# Patient Record
Sex: FEMALE | Race: WHITE | NOT HISPANIC OR LATINO | Employment: PART TIME | URBAN - METROPOLITAN AREA
[De-identification: names, ages, dates, MRNs, and addresses within clinical notes are randomized per-mention and may not be internally consistent; named-entity substitution may affect disease eponyms.]

---

## 2017-02-24 ENCOUNTER — CONVERSION ENCOUNTER (OUTPATIENT)
Dept: FAMILY MEDICINE CLINIC | Facility: CLINIC | Age: 67
End: 2017-02-24

## 2017-02-24 ENCOUNTER — HOSPITAL ENCOUNTER (OUTPATIENT)
Dept: FAMILY MEDICINE CLINIC | Facility: CLINIC | Age: 67
Setting detail: SPECIMEN
Discharge: HOME | End: 2017-02-24
Attending: NURSE PRACTITIONER | Admitting: NURSE PRACTITIONER

## 2017-02-24 LAB
BACTERIA ISLT: NORMAL
BACTERIA ISLT: NORMAL
BACTERIA SPEC AEROBE CULT: NORMAL
BACTERIA SPEC AEROBE CULT: NORMAL
COLONY COUNT: NORMAL
Lab: NORMAL
MICRO REPORT STATUS: NORMAL
NITROFURANTOIN SUSC ISLT: NORMAL
OXACILLIN SUSC ISLT: NORMAL
SPECIMEN SOURCE: NORMAL
SUSC METH SPEC: NORMAL
TETRACYCLINE SUSC ISLT: NORMAL
TRIMETHOPRIM/SULFA: NORMAL
VANCOMYCIN SUSC ISLT: NORMAL

## 2017-03-27 ENCOUNTER — CONVERSION ENCOUNTER (OUTPATIENT)
Dept: FAMILY MEDICINE CLINIC | Facility: CLINIC | Age: 67
End: 2017-03-27

## 2017-03-27 ENCOUNTER — HOSPITAL ENCOUNTER (OUTPATIENT)
Dept: FAMILY MEDICINE CLINIC | Facility: CLINIC | Age: 67
Setting detail: SPECIMEN
Discharge: HOME | End: 2017-03-27

## 2017-03-27 LAB
ALBUMIN SERPL-MCNC: 3.8 G/DL (ref 3.5–4.8)
ALBUMIN/GLOB SERPL: 1.5 {RATIO} (ref 1–1.7)
ALP SERPL-CCNC: 57 IU/L (ref 32–91)
ALT SERPL-CCNC: 20 IU/L (ref 14–54)
ANION GAP SERPL CALC-SCNC: 11.8 MMOL/L (ref 10–20)
AST SERPL-CCNC: 21 IU/L (ref 15–41)
BASOPHILS # BLD AUTO: 0 10*3/UL (ref 0–0.2)
BASOPHILS NFR BLD AUTO: 1 % (ref 0–2)
BILIRUB SERPL-MCNC: 1 MG/DL (ref 0.3–1.2)
BUN SERPL-MCNC: 9 MG/DL (ref 8–20)
BUN/CREAT SERPL: 11.3 (ref 5.4–26.2)
CALCIUM SERPL-MCNC: 9.2 MG/DL (ref 8.9–10.3)
CHLORIDE SERPL-SCNC: 104 MMOL/L (ref 101–111)
CHOLEST SERPL-MCNC: 225 MG/DL
CHOLEST/HDLC SERPL: 5.3 {RATIO}
CONV CO2: 30 MMOL/L (ref 22–32)
CONV LDL CHOLESTEROL DIRECT: 149 MG/DL (ref 0–100)
CONV SMEAR REVIEW: (no result)
CONV TOTAL PROTEIN: 6.4 G/DL (ref 6.1–7.9)
CREAT UR-MCNC: 0.8 MG/DL (ref 0.4–1)
DIFFERENTIAL METHOD BLD: (no result)
EOSINOPHIL # BLD AUTO: 0.1 10*3/UL (ref 0–0.3)
EOSINOPHIL # BLD AUTO: 2 % (ref 0–3)
ERYTHROCYTE [DISTWIDTH] IN BLOOD BY AUTOMATED COUNT: 14.2 % (ref 11.5–14.5)
GLOBULIN UR ELPH-MCNC: 2.6 G/DL (ref 2.5–3.8)
GLUCOSE SERPL-MCNC: 82 MG/DL (ref 65–99)
HCT VFR BLD AUTO: 43.2 % (ref 35–49)
HDLC SERPL-MCNC: 43 MG/DL
HGB BLD-MCNC: 14.5 G/DL (ref 12–15)
LDLC/HDLC SERPL: 3.5 {RATIO}
LIPID INTERPRETATION: ABNORMAL
LYMPHOCYTES # BLD AUTO: 0.6 10*3/UL (ref 0.8–4.8)
LYMPHOCYTES NFR BLD AUTO: 17 % (ref 18–42)
MCH RBC QN AUTO: 30.2 PG (ref 26–32)
MCHC RBC AUTO-ENTMCNC: 33.6 G/DL (ref 32–36)
MCV RBC AUTO: 89.8 FL (ref 80–94)
MONOCYTES # BLD AUTO: 0.2 10*3/UL (ref 0.1–1.3)
MONOCYTES NFR BLD AUTO: 7 % (ref 2–11)
NEUTROPHILS # BLD AUTO: 2.4 10*3/UL (ref 2.3–8.6)
NEUTROPHILS NFR BLD AUTO: 73 % (ref 50–75)
NRBC BLD AUTO-RTO: 0 /100{WBCS}
NRBC/RBC NFR BLD MANUAL: 0 10*3/UL
PLATELET # BLD AUTO: (no result) 10*3/UL (ref 150–450)
PLT COMMENT: (no result)
PMV BLD AUTO: 8.3 FL (ref 7.4–10.4)
POTASSIUM SERPL-SCNC: 4.8 MMOL/L (ref 3.6–5.1)
RBC # BLD AUTO: 4.82 10*6/UL (ref 4–5.4)
SODIUM SERPL-SCNC: 141 MMOL/L (ref 136–144)
TRIGL SERPL-MCNC: 180 MG/DL
TSH SERPL-ACNC: 1.69 UIU/ML (ref 0.34–5.6)
VLDLC SERPL CALC-MCNC: 33.2 MG/DL
WBC # BLD AUTO: 3.3 10*3/UL (ref 4.5–11.5)

## 2017-07-17 ENCOUNTER — CONVERSION ENCOUNTER (OUTPATIENT)
Dept: FAMILY MEDICINE CLINIC | Facility: CLINIC | Age: 67
End: 2017-07-17

## 2017-08-04 ENCOUNTER — OFFICE (AMBULATORY)
Dept: URBAN - METROPOLITAN AREA CLINIC 64 | Facility: CLINIC | Age: 67
End: 2017-08-04

## 2017-08-04 VITALS
WEIGHT: 244 LBS | DIASTOLIC BLOOD PRESSURE: 87 MMHG | HEIGHT: 64 IN | SYSTOLIC BLOOD PRESSURE: 152 MMHG | HEART RATE: 76 BPM

## 2017-08-04 DIAGNOSIS — Z86.010 PERSONAL HISTORY OF COLONIC POLYPS: ICD-10-CM

## 2017-08-04 DIAGNOSIS — K74.60 UNSPECIFIED CIRRHOSIS OF LIVER: ICD-10-CM

## 2017-08-04 DIAGNOSIS — I85.00 ESOPHAGEAL VARICES WITHOUT BLEEDING: ICD-10-CM

## 2017-08-04 LAB
AFP, SERUM, TUMOR MARKER: 2.8 NG/ML (ref 0–8.3)
AMMONIA, PLASMA: 30 UG/DL (ref 19–87)
CBC WITH DIFFERENTIAL/PLATELET: BASO (ABSOLUTE): 0 X10E3/UL (ref 0–0.2)
CBC WITH DIFFERENTIAL/PLATELET: BASOS: 0 %
CBC WITH DIFFERENTIAL/PLATELET: EOS (ABSOLUTE): 0.1 X10E3/UL (ref 0–0.4)
CBC WITH DIFFERENTIAL/PLATELET: EOS: 2 %
CBC WITH DIFFERENTIAL/PLATELET: HEMATOCRIT: 35.8 % (ref 34–46.6)
CBC WITH DIFFERENTIAL/PLATELET: HEMATOLOGY COMMENTS: (no result)
CBC WITH DIFFERENTIAL/PLATELET: HEMOGLOBIN: 11.7 G/DL (ref 11.1–15.9)
CBC WITH DIFFERENTIAL/PLATELET: IMMATURE CELLS: (no result)
CBC WITH DIFFERENTIAL/PLATELET: IMMATURE GRANS (ABS): 0 X10E3/UL (ref 0–0.1)
CBC WITH DIFFERENTIAL/PLATELET: IMMATURE GRANULOCYTES: 0 %
CBC WITH DIFFERENTIAL/PLATELET: LYMPHS (ABSOLUTE): 0.6 X10E3/UL — LOW (ref 0.7–3.1)
CBC WITH DIFFERENTIAL/PLATELET: LYMPHS: 22 %
CBC WITH DIFFERENTIAL/PLATELET: MCH: 29.5 PG (ref 26.6–33)
CBC WITH DIFFERENTIAL/PLATELET: MCHC: 32.7 G/DL (ref 31.5–35.7)
CBC WITH DIFFERENTIAL/PLATELET: MCV: 90 FL (ref 79–97)
CBC WITH DIFFERENTIAL/PLATELET: MONOCYTES(ABSOLUTE): 0.4 X10E3/UL (ref 0.1–0.9)
CBC WITH DIFFERENTIAL/PLATELET: MONOCYTES: 17 %
CBC WITH DIFFERENTIAL/PLATELET: NEUTROPHILS (ABSOLUTE): 1.5 X10E3/UL (ref 1.4–7)
CBC WITH DIFFERENTIAL/PLATELET: NEUTROPHILS: 59 %
CBC WITH DIFFERENTIAL/PLATELET: NRBC: (no result)
CBC WITH DIFFERENTIAL/PLATELET: PLATELETS: 97 X10E3/UL — CRITICAL LOW (ref 150–379)
CBC WITH DIFFERENTIAL/PLATELET: RBC: 3.97 X10E6/UL (ref 3.77–5.28)
CBC WITH DIFFERENTIAL/PLATELET: RDW: 15 % (ref 12.3–15.4)
CBC WITH DIFFERENTIAL/PLATELET: WBC: 2.5 X10E3/UL — CRITICAL LOW (ref 3.4–10.8)
COMP. METABOLIC PANEL (14): A/G RATIO: 2 (ref 1.2–2.2)
COMP. METABOLIC PANEL (14): ALBUMIN, SERUM: 4.1 G/DL (ref 3.6–4.8)
COMP. METABOLIC PANEL (14): ALKALINE PHOSPHATASE, S: 69 IU/L (ref 39–117)
COMP. METABOLIC PANEL (14): ALT (SGPT): 25 IU/L (ref 0–32)
COMP. METABOLIC PANEL (14): AST (SGOT): 29 IU/L (ref 0–40)
COMP. METABOLIC PANEL (14): BILIRUBIN, TOTAL: 0.9 MG/DL (ref 0–1.2)
COMP. METABOLIC PANEL (14): BUN/CREATININE RATIO: 15 (ref 12–28)
COMP. METABOLIC PANEL (14): BUN: 13 MG/DL (ref 8–27)
COMP. METABOLIC PANEL (14): CALCIUM, SERUM: 8.6 MG/DL — LOW (ref 8.7–10.3)
COMP. METABOLIC PANEL (14): CARBON DIOXIDE, TOTAL: 25 MMOL/L (ref 18–29)
COMP. METABOLIC PANEL (14): CHLORIDE, SERUM: 97 MMOL/L (ref 96–106)
COMP. METABOLIC PANEL (14): CREATININE, SERUM: 0.88 MG/DL (ref 0.57–1)
COMP. METABOLIC PANEL (14): EGFR IF AFRICN AM: 79 ML/MIN/1.73 (ref 59–?)
COMP. METABOLIC PANEL (14): EGFR IF NONAFRICN AM: 68 ML/MIN/1.73 (ref 59–?)
COMP. METABOLIC PANEL (14): GLOBULIN, TOTAL: 2.1 G/DL (ref 1.5–4.5)
COMP. METABOLIC PANEL (14): GLUCOSE, SERUM: 99 MG/DL (ref 65–99)
COMP. METABOLIC PANEL (14): POTASSIUM, SERUM: 3.4 MMOL/L — LOW (ref 3.5–5.2)
COMP. METABOLIC PANEL (14): PROTEIN, TOTAL, SERUM: 6.2 G/DL (ref 6–8.5)
COMP. METABOLIC PANEL (14): SODIUM, SERUM: 139 MMOL/L (ref 134–144)
PANEL 083935: HIV SCREEN 4TH GENERATION WRFX: NON REACTIVE
PROTHROMBIN TIME (PT): INR: 1 (ref 0.8–1.2)
PROTHROMBIN TIME (PT): PROTHROMBIN TIME: 10.7 SEC (ref 9.1–12)

## 2017-08-04 PROCEDURE — 99214 OFFICE O/P EST MOD 30 MIN: CPT | Performed by: NURSE PRACTITIONER

## 2017-09-06 ENCOUNTER — ON CAMPUS - OUTPATIENT (AMBULATORY)
Dept: URBAN - METROPOLITAN AREA HOSPITAL 2 | Facility: HOSPITAL | Age: 67
End: 2017-09-06

## 2017-09-06 VITALS
SYSTOLIC BLOOD PRESSURE: 143 MMHG | WEIGHT: 238 LBS | OXYGEN SATURATION: 100 % | SYSTOLIC BLOOD PRESSURE: 127 MMHG | SYSTOLIC BLOOD PRESSURE: 151 MMHG | RESPIRATION RATE: 20 BRPM | DIASTOLIC BLOOD PRESSURE: 68 MMHG | TEMPERATURE: 98.3 F | DIASTOLIC BLOOD PRESSURE: 31 MMHG | DIASTOLIC BLOOD PRESSURE: 80 MMHG | SYSTOLIC BLOOD PRESSURE: 168 MMHG | DIASTOLIC BLOOD PRESSURE: 69 MMHG | DIASTOLIC BLOOD PRESSURE: 70 MMHG | SYSTOLIC BLOOD PRESSURE: 147 MMHG | RESPIRATION RATE: 18 BRPM | DIASTOLIC BLOOD PRESSURE: 87 MMHG | HEIGHT: 64 IN | HEART RATE: 76 BPM | HEART RATE: 73 BPM | OXYGEN SATURATION: 97 % | HEART RATE: 80 BPM | DIASTOLIC BLOOD PRESSURE: 72 MMHG | SYSTOLIC BLOOD PRESSURE: 135 MMHG | DIASTOLIC BLOOD PRESSURE: 76 MMHG | RESPIRATION RATE: 16 BRPM | SYSTOLIC BLOOD PRESSURE: 120 MMHG | DIASTOLIC BLOOD PRESSURE: 74 MMHG | HEART RATE: 72 BPM | OXYGEN SATURATION: 98 % | OXYGEN SATURATION: 96 % | HEART RATE: 77 BPM | HEART RATE: 82 BPM | SYSTOLIC BLOOD PRESSURE: 140 MMHG | SYSTOLIC BLOOD PRESSURE: 142 MMHG | HEART RATE: 78 BPM | HEART RATE: 74 BPM | SYSTOLIC BLOOD PRESSURE: 150 MMHG | SYSTOLIC BLOOD PRESSURE: 154 MMHG

## 2017-09-06 DIAGNOSIS — Z86.010 PERSONAL HISTORY OF COLONIC POLYPS: ICD-10-CM

## 2017-09-06 DIAGNOSIS — I85.00 ESOPHAGEAL VARICES WITHOUT BLEEDING: ICD-10-CM

## 2017-09-06 DIAGNOSIS — K57.30 DIVERTICULOSIS OF LARGE INTESTINE WITHOUT PERFORATION OR ABS: ICD-10-CM

## 2017-09-06 PROCEDURE — 45378 DIAGNOSTIC COLONOSCOPY: CPT | Mod: PT

## 2017-09-06 PROCEDURE — 43244 EGD VARICES LIGATION: CPT

## 2017-09-06 RX ORDER — SPIRONOLACTONE 100 MG/1
TABLET, FILM COATED ORAL
Qty: 30 | Refills: 5 | Status: COMPLETED
Start: 2017-09-06 | End: 2019-01-01

## 2017-09-06 RX ADMIN — PROPOFOL: 10 INJECTION, EMULSION INTRAVENOUS at 13:59

## 2017-09-21 ENCOUNTER — CONVERSION ENCOUNTER (OUTPATIENT)
Dept: FAMILY MEDICINE CLINIC | Facility: CLINIC | Age: 67
End: 2017-09-21

## 2017-09-21 ENCOUNTER — HOSPITAL ENCOUNTER (OUTPATIENT)
Dept: FAMILY MEDICINE CLINIC | Facility: CLINIC | Age: 67
Setting detail: SPECIMEN
Discharge: HOME | End: 2017-09-21
Attending: NURSE PRACTITIONER | Admitting: NURSE PRACTITIONER

## 2017-09-21 LAB
ALBUMIN SERPL-MCNC: 4 G/DL (ref 3.5–4.8)
ALBUMIN/GLOB SERPL: 1.6 {RATIO} (ref 1–1.7)
ALP SERPL-CCNC: 61 IU/L (ref 32–91)
ALT SERPL-CCNC: 18 IU/L (ref 14–54)
ANION GAP SERPL CALC-SCNC: 10.5 MMOL/L (ref 10–20)
AST SERPL-CCNC: 21 IU/L (ref 15–41)
BILIRUB SERPL-MCNC: 1 MG/DL (ref 0.3–1.2)
BUN SERPL-MCNC: 15 MG/DL (ref 8–20)
BUN/CREAT SERPL: 16.7 (ref 5.4–26.2)
CALCIUM SERPL-MCNC: 9.1 MG/DL (ref 8.9–10.3)
CHLORIDE SERPL-SCNC: 101 MMOL/L (ref 101–111)
CHOLEST SERPL-MCNC: 155 MG/DL
CHOLEST/HDLC SERPL: 3.9 {RATIO}
CONV CO2: 30 MMOL/L (ref 22–32)
CONV LDL CHOLESTEROL DIRECT: 109 MG/DL (ref 0–100)
CONV TOTAL PROTEIN: 6.5 G/DL (ref 6.1–7.9)
CREAT UR-MCNC: 0.9 MG/DL (ref 0.4–1)
GLOBULIN UR ELPH-MCNC: 2.5 G/DL (ref 2.5–3.8)
GLUCOSE SERPL-MCNC: 95 MG/DL (ref 65–99)
HDLC SERPL-MCNC: 39 MG/DL
LDLC/HDLC SERPL: 2.8 {RATIO}
LIPID INTERPRETATION: ABNORMAL
POTASSIUM SERPL-SCNC: 4.5 MMOL/L (ref 3.6–5.1)
SODIUM SERPL-SCNC: 137 MMOL/L (ref 136–144)
TRIGL SERPL-MCNC: 80 MG/DL
TSH SERPL-ACNC: 2.34 UIU/ML (ref 0.34–5.6)
VLDLC SERPL CALC-MCNC: 6.8 MG/DL

## 2017-09-22 ENCOUNTER — HOSPITAL ENCOUNTER (OUTPATIENT)
Dept: FAMILY MEDICINE CLINIC | Facility: CLINIC | Age: 67
Setting detail: SPECIMEN
Discharge: HOME | End: 2017-09-22
Attending: NURSE PRACTITIONER | Admitting: NURSE PRACTITIONER

## 2017-09-22 ENCOUNTER — HOSPITAL ENCOUNTER (OUTPATIENT)
Dept: MAMMOGRAPHY | Facility: HOSPITAL | Age: 67
Discharge: HOME | End: 2017-09-22
Attending: NURSE PRACTITIONER | Admitting: NURSE PRACTITIONER

## 2017-09-22 LAB
AZTREONAM SUSC ISLT: NORMAL
BACTERIA ISLT: NORMAL
BACTERIA SPEC AEROBE CULT: NORMAL
CEFEPIME SUSC ISLT: NORMAL
CEFTRIAXONE SUSC ISLT: NORMAL
CIPROFLOXACIN SUSC ISLT: NORMAL
COLONY COUNT: NORMAL
ERTAPENEM SUSC ISLT: NORMAL
LEVOFLOXACIN SUSC ISLT: NORMAL
Lab: NORMAL
MEROPENEM SUSC ISLT: NORMAL
MICRO REPORT STATUS: NORMAL
NITROFURANTOIN SUSC ISLT: NORMAL
PIP+TAZO SUSC ISLT: NORMAL
SPECIMEN SOURCE: NORMAL
SUSC METH SPEC: NORMAL
TETRACYCLINE SUSC ISLT: NORMAL
TOBRAMYCIN SUSC ISLT: NORMAL
TRIMETHOPRIM/SULFA: NORMAL

## 2017-10-11 ENCOUNTER — OFFICE (AMBULATORY)
Dept: URBAN - METROPOLITAN AREA CLINIC 64 | Facility: CLINIC | Age: 67
End: 2017-10-11

## 2017-10-11 VITALS
DIASTOLIC BLOOD PRESSURE: 72 MMHG | SYSTOLIC BLOOD PRESSURE: 124 MMHG | HEART RATE: 73 BPM | WEIGHT: 232 LBS | HEIGHT: 64 IN

## 2017-10-11 DIAGNOSIS — K57.32 DIVERTICULITIS OF LARGE INTESTINE WITHOUT PERFORATION OR ABS: ICD-10-CM

## 2017-10-11 DIAGNOSIS — R31.9 HEMATURIA, UNSPECIFIED: ICD-10-CM

## 2017-10-11 DIAGNOSIS — R10.32 LEFT LOWER QUADRANT PAIN: ICD-10-CM

## 2017-10-11 PROCEDURE — 99213 OFFICE O/P EST LOW 20 MIN: CPT

## 2017-10-11 RX ORDER — CIPROFLOXACIN 500 MG/1
1000 TABLET, FILM COATED ORAL
Qty: 20 | Refills: 0 | Status: ACTIVE
Start: 2017-10-11

## 2018-02-15 ENCOUNTER — HOSPITAL ENCOUNTER (OUTPATIENT)
Dept: FAMILY MEDICINE CLINIC | Facility: CLINIC | Age: 68
Setting detail: SPECIMEN
Discharge: HOME | End: 2018-02-15
Attending: NURSE PRACTITIONER | Admitting: NURSE PRACTITIONER

## 2018-02-15 ENCOUNTER — CONVERSION ENCOUNTER (OUTPATIENT)
Dept: FAMILY MEDICINE CLINIC | Facility: CLINIC | Age: 68
End: 2018-02-15

## 2018-02-15 LAB
ANION GAP SERPL CALC-SCNC: 10.1 MMOL/L (ref 10–20)
BASOPHILS # BLD AUTO: 0 10*3/UL (ref 0–0.2)
BASOPHILS NFR BLD AUTO: 1 % (ref 0–2)
BUN SERPL-MCNC: 27 MG/DL (ref 8–20)
BUN/CREAT SERPL: 19.3 (ref 5.4–26.2)
CALCIUM SERPL-MCNC: 9.2 MG/DL (ref 8.9–10.3)
CHLORIDE SERPL-SCNC: 105 MMOL/L (ref 101–111)
CHOLEST SERPL-MCNC: 155 MG/DL
CHOLEST/HDLC SERPL: 5.2 {RATIO}
CONV CO2: 25 MMOL/L (ref 22–32)
CONV LDL CHOLESTEROL DIRECT: 106 MG/DL (ref 0–100)
CREAT UR-MCNC: 1.4 MG/DL (ref 0.4–1)
DIFFERENTIAL METHOD BLD: (no result)
EOSINOPHIL # BLD AUTO: 0.1 10*3/UL (ref 0–0.3)
EOSINOPHIL # BLD AUTO: 1 % (ref 0–3)
ERYTHROCYTE [DISTWIDTH] IN BLOOD BY AUTOMATED COUNT: 15.6 % (ref 11.5–14.5)
GLUCOSE SERPL-MCNC: 105 MG/DL (ref 65–99)
HCT VFR BLD AUTO: 36.4 % (ref 35–49)
HDLC SERPL-MCNC: 30 MG/DL
HGB BLD-MCNC: 12.4 G/DL (ref 12–15)
LDLC/HDLC SERPL: 3.5 {RATIO}
LIPID INTERPRETATION: ABNORMAL
LYMPHOCYTES # BLD AUTO: 1 10*3/UL (ref 0.8–4.8)
LYMPHOCYTES NFR BLD AUTO: 21 % (ref 18–42)
MCH RBC QN AUTO: 30.3 PG (ref 26–32)
MCHC RBC AUTO-ENTMCNC: 34 G/DL (ref 32–36)
MCV RBC AUTO: 89.4 FL (ref 80–94)
MONOCYTES # BLD AUTO: 0.5 10*3/UL (ref 0.1–1.3)
MONOCYTES NFR BLD AUTO: 11 % (ref 2–11)
NEUTROPHILS # BLD AUTO: 3.1 10*3/UL (ref 2.3–8.6)
NEUTROPHILS NFR BLD AUTO: 66 % (ref 50–75)
NRBC BLD AUTO-RTO: 0 /100{WBCS}
NRBC/RBC NFR BLD MANUAL: 0 10*3/UL
PLATELET # BLD AUTO: 116 10*3/UL (ref 150–450)
PMV BLD AUTO: 7.5 FL (ref 7.4–10.4)
POTASSIUM SERPL-SCNC: 5.1 MMOL/L (ref 3.6–5.1)
RBC # BLD AUTO: 4.08 10*6/UL (ref 4–5.4)
SODIUM SERPL-SCNC: 135 MMOL/L (ref 136–144)
TRIGL SERPL-MCNC: 120 MG/DL
VLDLC SERPL CALC-MCNC: 19.1 MG/DL
WBC # BLD AUTO: 4.6 10*3/UL (ref 4.5–11.5)

## 2018-03-06 ENCOUNTER — HOSPITAL ENCOUNTER (OUTPATIENT)
Dept: FAMILY MEDICINE CLINIC | Facility: CLINIC | Age: 68
Setting detail: SPECIMEN
Discharge: HOME | End: 2018-03-06
Attending: NURSE PRACTITIONER | Admitting: NURSE PRACTITIONER

## 2018-03-06 ENCOUNTER — CONVERSION ENCOUNTER (OUTPATIENT)
Dept: FAMILY MEDICINE CLINIC | Facility: CLINIC | Age: 68
End: 2018-03-06

## 2018-03-06 LAB
ANION GAP SERPL CALC-SCNC: 10.5 MMOL/L (ref 10–20)
BUN SERPL-MCNC: 14 MG/DL (ref 8–20)
BUN/CREAT SERPL: 12.7 (ref 5.4–26.2)
CALCIUM SERPL-MCNC: 9.2 MG/DL (ref 8.9–10.3)
CHLORIDE SERPL-SCNC: 102 MMOL/L (ref 101–111)
CONV CO2: 29 MMOL/L (ref 22–32)
CREAT UR-MCNC: 1.1 MG/DL (ref 0.4–1)
GLUCOSE SERPL-MCNC: 85 MG/DL (ref 65–99)
POTASSIUM SERPL-SCNC: 4.5 MMOL/L (ref 3.6–5.1)
SODIUM SERPL-SCNC: 137 MMOL/L (ref 136–144)

## 2018-08-31 ENCOUNTER — HOSPITAL ENCOUNTER (OUTPATIENT)
Dept: FAMILY MEDICINE CLINIC | Facility: CLINIC | Age: 68
Setting detail: SPECIMEN
Discharge: HOME | End: 2018-08-31
Attending: PHYSICIAN ASSISTANT | Admitting: PHYSICIAN ASSISTANT

## 2018-08-31 ENCOUNTER — CONVERSION ENCOUNTER (OUTPATIENT)
Dept: FAMILY MEDICINE CLINIC | Facility: CLINIC | Age: 68
End: 2018-08-31

## 2018-08-31 LAB
ALBUMIN SERPL-MCNC: 3.8 G/DL (ref 3.5–4.8)
ALBUMIN/GLOB SERPL: 1.7 {RATIO} (ref 1–1.7)
ALP SERPL-CCNC: 63 IU/L (ref 32–91)
ALT SERPL-CCNC: 23 IU/L (ref 14–54)
ANION GAP SERPL CALC-SCNC: 12.7 MMOL/L (ref 10–20)
AST SERPL-CCNC: 23 IU/L (ref 15–41)
BASOPHILS # BLD AUTO: 0 10*3/UL (ref 0–0.2)
BASOPHILS NFR BLD AUTO: 1 % (ref 0–2)
BILIRUB SERPL-MCNC: 0.5 MG/DL (ref 0.3–1.2)
BUN SERPL-MCNC: 19 MG/DL (ref 8–20)
BUN/CREAT SERPL: 17.3 (ref 5.4–26.2)
CALCIUM SERPL-MCNC: 9.1 MG/DL (ref 8.9–10.3)
CHLORIDE SERPL-SCNC: 101 MMOL/L (ref 101–111)
CHOLEST SERPL-MCNC: 198 MG/DL
CHOLEST/HDLC SERPL: 4.4 {RATIO}
CONV CO2: 29 MMOL/L (ref 22–32)
CONV LDL CHOLESTEROL DIRECT: 134 MG/DL (ref 0–100)
CONV TOTAL PROTEIN: 6.1 G/DL (ref 6.1–7.9)
CREAT UR-MCNC: 1.1 MG/DL (ref 0.4–1)
DIFFERENTIAL METHOD BLD: (no result)
EOSINOPHIL # BLD AUTO: 0 10*3/UL (ref 0–0.3)
EOSINOPHIL # BLD AUTO: 2 % (ref 0–3)
ERYTHROCYTE [DISTWIDTH] IN BLOOD BY AUTOMATED COUNT: 15 % (ref 11.5–14.5)
GLOBULIN UR ELPH-MCNC: 2.3 G/DL (ref 2.5–3.8)
GLUCOSE SERPL-MCNC: 91 MG/DL (ref 65–99)
HCT VFR BLD AUTO: 38.2 % (ref 35–49)
HDLC SERPL-MCNC: 45 MG/DL
HGB BLD-MCNC: 12.4 G/DL (ref 12–15)
LDLC/HDLC SERPL: 3 {RATIO}
LIPID INTERPRETATION: ABNORMAL
LYMPHOCYTES # BLD AUTO: 0.5 10*3/UL (ref 0.8–4.8)
LYMPHOCYTES NFR BLD AUTO: 20 % (ref 18–42)
MCH RBC QN AUTO: 28.9 PG (ref 26–32)
MCHC RBC AUTO-ENTMCNC: 32.6 G/DL (ref 32–36)
MCV RBC AUTO: 88.6 FL (ref 80–94)
MONOCYTES # BLD AUTO: 0.2 10*3/UL (ref 0.1–1.3)
MONOCYTES NFR BLD AUTO: 8 % (ref 2–11)
NEUTROPHILS # BLD AUTO: 1.8 10*3/UL (ref 2.3–8.6)
NEUTROPHILS NFR BLD AUTO: 69 % (ref 50–75)
NRBC BLD AUTO-RTO: 0 /100{WBCS}
NRBC/RBC NFR BLD MANUAL: 0 10*3/UL
PLATELET # BLD AUTO: (no result) 10*3/UL (ref 150–450)
PMV BLD AUTO: 8.1 FL (ref 7.4–10.4)
POTASSIUM SERPL-SCNC: 4.7 MMOL/L (ref 3.6–5.1)
RBC # BLD AUTO: 4.31 10*6/UL (ref 4–5.4)
SODIUM SERPL-SCNC: 138 MMOL/L (ref 136–144)
TRIGL SERPL-MCNC: 103 MG/DL
VLDLC SERPL CALC-MCNC: 18.7 MG/DL
WBC # BLD AUTO: 2.6 10*3/UL (ref 4.5–11.5)

## 2019-01-01 ENCOUNTER — TELEPHONE (OUTPATIENT)
Dept: ONCOLOGY | Facility: CLINIC | Age: 69
End: 2019-01-01

## 2019-01-01 ENCOUNTER — ON CAMPUS - OUTPATIENT (AMBULATORY)
Dept: URBAN - METROPOLITAN AREA HOSPITAL 2 | Facility: HOSPITAL | Age: 69
End: 2019-01-01

## 2019-01-01 ENCOUNTER — ANESTHESIA (OUTPATIENT)
Dept: PERIOP | Facility: HOSPITAL | Age: 69
End: 2019-01-01

## 2019-01-01 ENCOUNTER — OFFICE VISIT (OUTPATIENT)
Dept: ONCOLOGY | Facility: CLINIC | Age: 69
End: 2019-01-01

## 2019-01-01 ENCOUNTER — APPOINTMENT (OUTPATIENT)
Dept: LAB | Facility: HOSPITAL | Age: 69
End: 2019-01-01

## 2019-01-01 ENCOUNTER — OFFICE VISIT (OUTPATIENT)
Dept: FAMILY MEDICINE CLINIC | Facility: CLINIC | Age: 69
End: 2019-01-01

## 2019-01-01 ENCOUNTER — TRANSCRIBE ORDERS (OUTPATIENT)
Dept: INFUSION THERAPY | Facility: HOSPITAL | Age: 69
End: 2019-01-01

## 2019-01-01 ENCOUNTER — OFFICE VISIT (OUTPATIENT)
Dept: SURGERY | Facility: CLINIC | Age: 69
End: 2019-01-01

## 2019-01-01 ENCOUNTER — PREP FOR SURGERY (OUTPATIENT)
Dept: OTHER | Facility: HOSPITAL | Age: 69
End: 2019-01-01

## 2019-01-01 ENCOUNTER — HOSPITAL ENCOUNTER (OUTPATIENT)
Dept: INFUSION THERAPY | Facility: HOSPITAL | Age: 69
Discharge: HOME | End: 2019-12-04
Admitting: NURSE PRACTITIONER

## 2019-01-01 ENCOUNTER — OFFICE (AMBULATORY)
Dept: URBAN - METROPOLITAN AREA PATHOLOGY 4 | Facility: PATHOLOGY | Age: 69
End: 2019-01-01
Payer: COMMERCIAL

## 2019-01-01 ENCOUNTER — APPOINTMENT (OUTPATIENT)
Dept: PREADMISSION TESTING | Facility: HOSPITAL | Age: 69
End: 2019-01-01

## 2019-01-01 ENCOUNTER — APPOINTMENT (OUTPATIENT)
Dept: GENERAL RADIOLOGY | Facility: HOSPITAL | Age: 69
End: 2019-01-01

## 2019-01-01 ENCOUNTER — HOSPITAL ENCOUNTER (EMERGENCY)
Facility: HOSPITAL | Age: 69
Discharge: HOME | End: 2019-12-25
Attending: EMERGENCY MEDICINE | Admitting: EMERGENCY MEDICINE

## 2019-01-01 ENCOUNTER — TELEPHONE (OUTPATIENT)
Dept: FAMILY MEDICINE CLINIC | Facility: CLINIC | Age: 69
End: 2019-01-01

## 2019-01-01 ENCOUNTER — HOSPITAL ENCOUNTER (OUTPATIENT)
Facility: HOSPITAL | Age: 69
Setting detail: HOSPITAL OUTPATIENT SURGERY
Discharge: HOME | End: 2019-07-24
Attending: SURGERY | Admitting: SURGERY

## 2019-01-01 ENCOUNTER — HOSPITAL ENCOUNTER (OUTPATIENT)
Dept: ONCOLOGY | Facility: HOSPITAL | Age: 69
Setting detail: INFUSION SERIES
Discharge: HOME | End: 2019-08-14

## 2019-01-01 ENCOUNTER — RESULTS ENCOUNTER (OUTPATIENT)
Dept: ONCOLOGY | Facility: CLINIC | Age: 69
End: 2019-01-01

## 2019-01-01 ENCOUNTER — HOSPITAL ENCOUNTER (OUTPATIENT)
Dept: CARDIOLOGY | Facility: HOSPITAL | Age: 69
Discharge: HOME | End: 2019-12-05
Admitting: INTERNAL MEDICINE

## 2019-01-01 ENCOUNTER — HOSPITAL ENCOUNTER (OUTPATIENT)
Dept: CARDIOLOGY | Facility: HOSPITAL | Age: 69
Discharge: HOME | End: 2019-11-13
Admitting: UROLOGY

## 2019-01-01 ENCOUNTER — LAB (OUTPATIENT)
Dept: FAMILY MEDICINE CLINIC | Facility: CLINIC | Age: 69
End: 2019-01-01

## 2019-01-01 ENCOUNTER — HOSPITAL ENCOUNTER (OUTPATIENT)
Dept: PET IMAGING | Facility: HOSPITAL | Age: 69
Discharge: HOME | End: 2019-08-06

## 2019-01-01 ENCOUNTER — HOSPITAL ENCOUNTER (OUTPATIENT)
Dept: INFUSION THERAPY | Facility: HOSPITAL | Age: 69
Discharge: HOME | End: 2019-11-07
Admitting: NURSE PRACTITIONER

## 2019-01-01 ENCOUNTER — ANESTHESIA EVENT (OUTPATIENT)
Dept: PERIOP | Facility: HOSPITAL | Age: 69
End: 2019-01-01

## 2019-01-01 ENCOUNTER — TELEPHONE (OUTPATIENT)
Dept: RADIATION ONCOLOGY | Facility: HOSPITAL | Age: 69
End: 2019-01-01

## 2019-01-01 ENCOUNTER — CONVERSION ENCOUNTER (OUTPATIENT)
Dept: FAMILY MEDICINE CLINIC | Facility: CLINIC | Age: 69
End: 2019-01-01

## 2019-01-01 ENCOUNTER — HOSPITAL ENCOUNTER (OUTPATIENT)
Dept: PET IMAGING | Facility: HOSPITAL | Age: 69
Discharge: HOME | End: 2019-10-10

## 2019-01-01 ENCOUNTER — HOSPITAL ENCOUNTER (OUTPATIENT)
Dept: CT IMAGING | Facility: HOSPITAL | Age: 69
Discharge: HOME | End: 2019-07-16
Admitting: RADIOLOGY

## 2019-01-01 ENCOUNTER — HOSPITAL ENCOUNTER (OUTPATIENT)
Dept: ONCOLOGY | Facility: HOSPITAL | Age: 69
Setting detail: INFUSION SERIES
Discharge: HOME | End: 2019-09-30

## 2019-01-01 ENCOUNTER — TRANSCRIBE ORDERS (OUTPATIENT)
Dept: ADMINISTRATIVE | Facility: HOSPITAL | Age: 69
End: 2019-01-01

## 2019-01-01 ENCOUNTER — OFFICE VISIT (OUTPATIENT)
Dept: CARDIOLOGY | Facility: CLINIC | Age: 69
End: 2019-01-01

## 2019-01-01 ENCOUNTER — OFFICE VISIT (OUTPATIENT)
Dept: LAB | Facility: HOSPITAL | Age: 69
End: 2019-01-01

## 2019-01-01 ENCOUNTER — LAB (OUTPATIENT)
Dept: LAB | Facility: HOSPITAL | Age: 69
End: 2019-01-01

## 2019-01-01 ENCOUNTER — HOSPITAL ENCOUNTER (OUTPATIENT)
Dept: ONCOLOGY | Facility: HOSPITAL | Age: 69
Setting detail: INFUSION SERIES
Discharge: HOME | End: 2019-09-23

## 2019-01-01 ENCOUNTER — HOSPITAL ENCOUNTER (OUTPATIENT)
Dept: GENERAL RADIOLOGY | Facility: HOSPITAL | Age: 69
Discharge: HOME | End: 2019-07-22
Admitting: SURGERY

## 2019-01-01 ENCOUNTER — TELEPHONE (OUTPATIENT)
Dept: ONCOLOGY | Facility: HOSPITAL | Age: 69
End: 2019-01-01

## 2019-01-01 ENCOUNTER — ON CAMPUS - OUTPATIENT (AMBULATORY)
Dept: URBAN - METROPOLITAN AREA HOSPITAL 2 | Facility: HOSPITAL | Age: 69
End: 2019-01-01
Payer: COMMERCIAL

## 2019-01-01 ENCOUNTER — HOSPITAL ENCOUNTER (OUTPATIENT)
Dept: PET IMAGING | Facility: HOSPITAL | Age: 69
Discharge: HOME | End: 2019-08-06
Admitting: INTERNAL MEDICINE

## 2019-01-01 ENCOUNTER — APPOINTMENT (OUTPATIENT)
Dept: CT IMAGING | Facility: HOSPITAL | Age: 69
End: 2019-01-01

## 2019-01-01 ENCOUNTER — APPOINTMENT (OUTPATIENT)
Dept: ONCOLOGY | Facility: CLINIC | Age: 69
End: 2019-01-01

## 2019-01-01 ENCOUNTER — HOSPITAL ENCOUNTER (OUTPATIENT)
Dept: FAMILY MEDICINE CLINIC | Facility: CLINIC | Age: 69
Setting detail: SPECIMEN
Discharge: HOME | End: 2019-05-17
Attending: NURSE PRACTITIONER | Admitting: NURSE PRACTITIONER

## 2019-01-01 ENCOUNTER — HOSPITAL ENCOUNTER (OUTPATIENT)
Dept: OTHER | Facility: HOSPITAL | Age: 69
Discharge: HOME | End: 2019-05-13
Attending: UROLOGY | Admitting: UROLOGY

## 2019-01-01 ENCOUNTER — HOSPITAL ENCOUNTER (OUTPATIENT)
Dept: PET IMAGING | Facility: HOSPITAL | Age: 69
Discharge: HOME | End: 2019-10-30
Admitting: INTERNAL MEDICINE

## 2019-01-01 ENCOUNTER — HOSPITAL ENCOUNTER (OUTPATIENT)
Dept: ONCOLOGY | Facility: HOSPITAL | Age: 69
Setting detail: INFUSION SERIES
Discharge: HOME | End: 2019-08-21

## 2019-01-01 ENCOUNTER — APPOINTMENT (OUTPATIENT)
Dept: CARDIOLOGY | Facility: HOSPITAL | Age: 69
End: 2019-01-01

## 2019-01-01 VITALS
OXYGEN SATURATION: 97 % | SYSTOLIC BLOOD PRESSURE: 139 MMHG | SYSTOLIC BLOOD PRESSURE: 114 MMHG | WEIGHT: 216 LBS | OXYGEN SATURATION: 95 % | HEIGHT: 64 IN | BODY MASS INDEX: 41.15 KG/M2 | HEART RATE: 81 BPM | OXYGEN SATURATION: 96 % | WEIGHT: 241 LBS | HEIGHT: 64 IN | BODY MASS INDEX: 34.31 KG/M2 | DIASTOLIC BLOOD PRESSURE: 77 MMHG | WEIGHT: 215.2 LBS | DIASTOLIC BLOOD PRESSURE: 67 MMHG | HEART RATE: 91 BPM | DIASTOLIC BLOOD PRESSURE: 94 MMHG | HEART RATE: 63 BPM | SYSTOLIC BLOOD PRESSURE: 122 MMHG | HEIGHT: 64 IN | HEIGHT: 64 IN | HEART RATE: 88 BPM | BODY MASS INDEX: 36.88 KG/M2 | DIASTOLIC BLOOD PRESSURE: 76 MMHG | BODY MASS INDEX: 36.74 KG/M2 | OXYGEN SATURATION: 97 % | WEIGHT: 201 LBS | SYSTOLIC BLOOD PRESSURE: 124 MMHG

## 2019-01-01 VITALS
WEIGHT: 225.8 LBS | HEIGHT: 64 IN | HEART RATE: 77 BPM | SYSTOLIC BLOOD PRESSURE: 110 MMHG | BODY MASS INDEX: 38.55 KG/M2 | DIASTOLIC BLOOD PRESSURE: 65 MMHG | OXYGEN SATURATION: 99 %

## 2019-01-01 VITALS
HEIGHT: 62 IN | WEIGHT: 189 LBS | DIASTOLIC BLOOD PRESSURE: 54 MMHG | SYSTOLIC BLOOD PRESSURE: 108 MMHG | BODY MASS INDEX: 34.78 KG/M2

## 2019-01-01 VITALS
WEIGHT: 209.4 LBS | BODY MASS INDEX: 38.53 KG/M2 | SYSTOLIC BLOOD PRESSURE: 129 MMHG | DIASTOLIC BLOOD PRESSURE: 51 MMHG | HEIGHT: 62 IN | HEART RATE: 96 BPM | RESPIRATION RATE: 19 BRPM | OXYGEN SATURATION: 99 % | TEMPERATURE: 98.5 F

## 2019-01-01 VITALS
OXYGEN SATURATION: 99 % | SYSTOLIC BLOOD PRESSURE: 120 MMHG | HEART RATE: 93 BPM | TEMPERATURE: 97.7 F | DIASTOLIC BLOOD PRESSURE: 65 MMHG

## 2019-01-01 VITALS
BODY MASS INDEX: 35.41 KG/M2 | RESPIRATION RATE: 18 BRPM | HEIGHT: 62 IN | TEMPERATURE: 98.8 F | WEIGHT: 192.4 LBS | HEART RATE: 90 BPM | DIASTOLIC BLOOD PRESSURE: 75 MMHG | SYSTOLIC BLOOD PRESSURE: 130 MMHG

## 2019-01-01 VITALS
RESPIRATION RATE: 16 BRPM | TEMPERATURE: 98.2 F | HEART RATE: 98 BPM | DIASTOLIC BLOOD PRESSURE: 61 MMHG | HEIGHT: 62 IN | SYSTOLIC BLOOD PRESSURE: 103 MMHG | BODY MASS INDEX: 36.77 KG/M2 | WEIGHT: 199.8 LBS

## 2019-01-01 VITALS
WEIGHT: 192.6 LBS | OXYGEN SATURATION: 97 % | HEART RATE: 94 BPM | DIASTOLIC BLOOD PRESSURE: 66 MMHG | SYSTOLIC BLOOD PRESSURE: 127 MMHG | HEIGHT: 62 IN | BODY MASS INDEX: 35.44 KG/M2 | TEMPERATURE: 98.1 F

## 2019-01-01 VITALS
HEART RATE: 91 BPM | TEMPERATURE: 97.8 F | SYSTOLIC BLOOD PRESSURE: 105 MMHG | DIASTOLIC BLOOD PRESSURE: 51 MMHG | OXYGEN SATURATION: 96 % | HEART RATE: 74 BPM | HEART RATE: 79 BPM | OXYGEN SATURATION: 95 % | OXYGEN SATURATION: 100 % | DIASTOLIC BLOOD PRESSURE: 62 MMHG | SYSTOLIC BLOOD PRESSURE: 140 MMHG | OXYGEN SATURATION: 99 % | SYSTOLIC BLOOD PRESSURE: 116 MMHG | HEIGHT: 64 IN | RESPIRATION RATE: 17 BRPM | SYSTOLIC BLOOD PRESSURE: 112 MMHG | SYSTOLIC BLOOD PRESSURE: 126 MMHG | HEART RATE: 75 BPM | DIASTOLIC BLOOD PRESSURE: 58 MMHG | HEART RATE: 80 BPM | WEIGHT: 191 LBS | DIASTOLIC BLOOD PRESSURE: 61 MMHG | RESPIRATION RATE: 16 BRPM | DIASTOLIC BLOOD PRESSURE: 84 MMHG | HEART RATE: 86 BPM | RESPIRATION RATE: 18 BRPM | DIASTOLIC BLOOD PRESSURE: 68 MMHG

## 2019-01-01 VITALS
HEART RATE: 78 BPM | SYSTOLIC BLOOD PRESSURE: 141 MMHG | HEART RATE: 79 BPM | OXYGEN SATURATION: 100 % | WEIGHT: 215 LBS | RESPIRATION RATE: 17 BRPM | SYSTOLIC BLOOD PRESSURE: 125 MMHG | HEART RATE: 81 BPM | OXYGEN SATURATION: 93 % | RESPIRATION RATE: 18 BRPM | SYSTOLIC BLOOD PRESSURE: 127 MMHG | HEIGHT: 64 IN | OXYGEN SATURATION: 98 % | DIASTOLIC BLOOD PRESSURE: 67 MMHG | HEART RATE: 84 BPM | HEART RATE: 93 BPM | SYSTOLIC BLOOD PRESSURE: 130 MMHG | HEART RATE: 80 BPM | DIASTOLIC BLOOD PRESSURE: 57 MMHG | OXYGEN SATURATION: 97 % | SYSTOLIC BLOOD PRESSURE: 131 MMHG | DIASTOLIC BLOOD PRESSURE: 51 MMHG | DIASTOLIC BLOOD PRESSURE: 68 MMHG | OXYGEN SATURATION: 96 % | DIASTOLIC BLOOD PRESSURE: 55 MMHG | DIASTOLIC BLOOD PRESSURE: 48 MMHG | DIASTOLIC BLOOD PRESSURE: 52 MMHG | SYSTOLIC BLOOD PRESSURE: 126 MMHG | HEART RATE: 82 BPM | TEMPERATURE: 97 F | DIASTOLIC BLOOD PRESSURE: 56 MMHG | SYSTOLIC BLOOD PRESSURE: 137 MMHG

## 2019-01-01 VITALS
TEMPERATURE: 97.7 F | HEIGHT: 62 IN | WEIGHT: 208.2 LBS | DIASTOLIC BLOOD PRESSURE: 67 MMHG | RESPIRATION RATE: 16 BRPM | HEART RATE: 90 BPM | SYSTOLIC BLOOD PRESSURE: 111 MMHG | BODY MASS INDEX: 38.31 KG/M2

## 2019-01-01 VITALS
HEART RATE: 66 BPM | WEIGHT: 216 LBS | HEART RATE: 70 BPM | RESPIRATION RATE: 18 BRPM | OXYGEN SATURATION: 97 % | SYSTOLIC BLOOD PRESSURE: 158 MMHG | SYSTOLIC BLOOD PRESSURE: 134 MMHG | DIASTOLIC BLOOD PRESSURE: 85 MMHG | HEART RATE: 101 BPM | SYSTOLIC BLOOD PRESSURE: 127 MMHG | DIASTOLIC BLOOD PRESSURE: 90 MMHG | WEIGHT: 211 LBS | HEART RATE: 73 BPM | OXYGEN SATURATION: 98 % | HEART RATE: 89 BPM | OXYGEN SATURATION: 97 % | SYSTOLIC BLOOD PRESSURE: 138 MMHG | OXYGEN SATURATION: 98 % | WEIGHT: 235 LBS | WEIGHT: 243 LBS | SYSTOLIC BLOOD PRESSURE: 129 MMHG | SYSTOLIC BLOOD PRESSURE: 122 MMHG | OXYGEN SATURATION: 99 % | DIASTOLIC BLOOD PRESSURE: 76 MMHG | WEIGHT: 240 LBS | HEART RATE: 76 BPM | DIASTOLIC BLOOD PRESSURE: 73 MMHG | DIASTOLIC BLOOD PRESSURE: 76 MMHG | DIASTOLIC BLOOD PRESSURE: 79 MMHG | OXYGEN SATURATION: 98 % | WEIGHT: 248 LBS

## 2019-01-01 VITALS
WEIGHT: 197 LBS | HEART RATE: 87 BPM | TEMPERATURE: 97.8 F | SYSTOLIC BLOOD PRESSURE: 115 MMHG | BODY MASS INDEX: 36.25 KG/M2 | RESPIRATION RATE: 18 BRPM | DIASTOLIC BLOOD PRESSURE: 65 MMHG | HEIGHT: 62 IN

## 2019-01-01 VITALS
RESPIRATION RATE: 14 BRPM | OXYGEN SATURATION: 99 % | HEART RATE: 78 BPM | TEMPERATURE: 98.1 F | DIASTOLIC BLOOD PRESSURE: 38 MMHG | SYSTOLIC BLOOD PRESSURE: 107 MMHG

## 2019-01-01 VITALS
SYSTOLIC BLOOD PRESSURE: 121 MMHG | HEART RATE: 72 BPM | TEMPERATURE: 97.3 F | RESPIRATION RATE: 18 BRPM | BODY MASS INDEX: 34.89 KG/M2 | HEIGHT: 62 IN | OXYGEN SATURATION: 100 % | WEIGHT: 189.6 LBS | DIASTOLIC BLOOD PRESSURE: 69 MMHG

## 2019-01-01 VITALS
RESPIRATION RATE: 17 BRPM | OXYGEN SATURATION: 100 % | WEIGHT: 195.33 LBS | SYSTOLIC BLOOD PRESSURE: 125 MMHG | BODY MASS INDEX: 35.94 KG/M2 | HEART RATE: 74 BPM | HEIGHT: 62 IN | TEMPERATURE: 98.3 F | DIASTOLIC BLOOD PRESSURE: 62 MMHG

## 2019-01-01 VITALS
DIASTOLIC BLOOD PRESSURE: 55 MMHG | SYSTOLIC BLOOD PRESSURE: 99 MMHG | OXYGEN SATURATION: 100 % | HEART RATE: 89 BPM | BODY MASS INDEX: 30.91 KG/M2 | WEIGHT: 168 LBS | HEIGHT: 62 IN

## 2019-01-01 VITALS
WEIGHT: 193 LBS | SYSTOLIC BLOOD PRESSURE: 113 MMHG | HEART RATE: 91 BPM | DIASTOLIC BLOOD PRESSURE: 60 MMHG | OXYGEN SATURATION: 98 % | BODY MASS INDEX: 35.51 KG/M2 | TEMPERATURE: 98.3 F | HEIGHT: 62 IN

## 2019-01-01 VITALS
OXYGEN SATURATION: 100 % | BODY MASS INDEX: 35.61 KG/M2 | DIASTOLIC BLOOD PRESSURE: 65 MMHG | WEIGHT: 193.5 LBS | HEIGHT: 62 IN | SYSTOLIC BLOOD PRESSURE: 109 MMHG | HEART RATE: 86 BPM

## 2019-01-01 VITALS
HEART RATE: 89 BPM | SYSTOLIC BLOOD PRESSURE: 123 MMHG | HEIGHT: 63 IN | BODY MASS INDEX: 36 KG/M2 | OXYGEN SATURATION: 96 % | WEIGHT: 203.2 LBS | DIASTOLIC BLOOD PRESSURE: 70 MMHG

## 2019-01-01 VITALS
OXYGEN SATURATION: 98 % | HEIGHT: 62 IN | BODY MASS INDEX: 35.41 KG/M2 | SYSTOLIC BLOOD PRESSURE: 119 MMHG | WEIGHT: 192.4 LBS | TEMPERATURE: 99.8 F | HEART RATE: 96 BPM | DIASTOLIC BLOOD PRESSURE: 68 MMHG

## 2019-01-01 VITALS
HEART RATE: 82 BPM | WEIGHT: 189.2 LBS | DIASTOLIC BLOOD PRESSURE: 66 MMHG | RESPIRATION RATE: 16 BRPM | TEMPERATURE: 97.7 F | HEIGHT: 62 IN | SYSTOLIC BLOOD PRESSURE: 115 MMHG | BODY MASS INDEX: 34.82 KG/M2

## 2019-01-01 VITALS
OXYGEN SATURATION: 97 % | HEIGHT: 62 IN | BODY MASS INDEX: 35.7 KG/M2 | SYSTOLIC BLOOD PRESSURE: 129 MMHG | WEIGHT: 194 LBS | TEMPERATURE: 99 F | DIASTOLIC BLOOD PRESSURE: 74 MMHG | HEART RATE: 105 BPM

## 2019-01-01 VITALS
SYSTOLIC BLOOD PRESSURE: 107 MMHG | TEMPERATURE: 97.5 F | RESPIRATION RATE: 18 BRPM | DIASTOLIC BLOOD PRESSURE: 62 MMHG | HEART RATE: 71 BPM | BODY MASS INDEX: 36.32 KG/M2 | HEIGHT: 63 IN | WEIGHT: 205 LBS

## 2019-01-01 VITALS
BODY MASS INDEX: 35.51 KG/M2 | TEMPERATURE: 98.7 F | DIASTOLIC BLOOD PRESSURE: 74 MMHG | RESPIRATION RATE: 18 BRPM | SYSTOLIC BLOOD PRESSURE: 111 MMHG | HEART RATE: 70 BPM | HEIGHT: 62 IN | WEIGHT: 193 LBS

## 2019-01-01 VITALS
HEIGHT: 62 IN | RESPIRATION RATE: 16 BRPM | HEART RATE: 83 BPM | BODY MASS INDEX: 32.57 KG/M2 | DIASTOLIC BLOOD PRESSURE: 56 MMHG | OXYGEN SATURATION: 100 % | SYSTOLIC BLOOD PRESSURE: 116 MMHG | TEMPERATURE: 98.2 F | WEIGHT: 177 LBS

## 2019-01-01 VITALS
SYSTOLIC BLOOD PRESSURE: 113 MMHG | HEART RATE: 82 BPM | DIASTOLIC BLOOD PRESSURE: 69 MMHG | RESPIRATION RATE: 18 BRPM | TEMPERATURE: 97.7 F | WEIGHT: 177.4 LBS | BODY MASS INDEX: 32.65 KG/M2 | HEIGHT: 62 IN

## 2019-01-01 VITALS
WEIGHT: 193.2 LBS | DIASTOLIC BLOOD PRESSURE: 53 MMHG | SYSTOLIC BLOOD PRESSURE: 130 MMHG | HEIGHT: 62 IN | RESPIRATION RATE: 18 BRPM | TEMPERATURE: 98.1 F | HEART RATE: 91 BPM | BODY MASS INDEX: 35.55 KG/M2

## 2019-01-01 VITALS
HEIGHT: 62 IN | DIASTOLIC BLOOD PRESSURE: 62 MMHG | WEIGHT: 203.5 LBS | TEMPERATURE: 98.5 F | SYSTOLIC BLOOD PRESSURE: 122 MMHG | RESPIRATION RATE: 20 BRPM | BODY MASS INDEX: 37.45 KG/M2 | HEART RATE: 82 BPM

## 2019-01-01 DIAGNOSIS — K21.9 GASTROESOPHAGEAL REFLUX DISEASE WITHOUT ESOPHAGITIS: ICD-10-CM

## 2019-01-01 DIAGNOSIS — I50.20 SYSTOLIC CONGESTIVE HEART FAILURE, UNSPECIFIED HF CHRONICITY (HCC): ICD-10-CM

## 2019-01-01 DIAGNOSIS — D50.0 IRON DEFICIENCY ANEMIA DUE TO CHRONIC BLOOD LOSS: ICD-10-CM

## 2019-01-01 DIAGNOSIS — L98.9 SKIN DISORDER: ICD-10-CM

## 2019-01-01 DIAGNOSIS — R18.8 ABDOMINAL FLUID COLLECTION: ICD-10-CM

## 2019-01-01 DIAGNOSIS — D12.5 BENIGN NEOPLASM OF SIGMOID COLON: ICD-10-CM

## 2019-01-01 DIAGNOSIS — L98.9 SKIN DISORDER: Primary | ICD-10-CM

## 2019-01-01 DIAGNOSIS — Z01.812 BLOOD TESTS PRIOR TO TREATMENT OR PROCEDURE: ICD-10-CM

## 2019-01-01 DIAGNOSIS — R18.8 OTHER ASCITES: ICD-10-CM

## 2019-01-01 DIAGNOSIS — D50.9 MICROCYTIC ANEMIA: ICD-10-CM

## 2019-01-01 DIAGNOSIS — R18.8 OTHER ASCITES: Primary | ICD-10-CM

## 2019-01-01 DIAGNOSIS — R60.0 LOCALIZED EDEMA: ICD-10-CM

## 2019-01-01 DIAGNOSIS — K76.6 HYPERTENSION, PORTAL (HCC): Primary | ICD-10-CM

## 2019-01-01 DIAGNOSIS — D61.818 PANCYTOPENIA (HCC): Primary | ICD-10-CM

## 2019-01-01 DIAGNOSIS — Z01.818 PREOP TESTING: ICD-10-CM

## 2019-01-01 DIAGNOSIS — C85.96 LYMPHOMA OF LYMPH NODES IN PELVIS, UNSPECIFIED LYMPHOMA TYPE (HCC): ICD-10-CM

## 2019-01-01 DIAGNOSIS — Z12.31 ENCOUNTER FOR SCREENING MAMMOGRAM FOR MALIGNANT NEOPLASM OF BREAST: ICD-10-CM

## 2019-01-01 DIAGNOSIS — E03.9 ACQUIRED HYPOTHYROIDISM: ICD-10-CM

## 2019-01-01 DIAGNOSIS — R06.02 SOB (SHORTNESS OF BREATH) ON EXERTION: ICD-10-CM

## 2019-01-01 DIAGNOSIS — T45.4X5A ADVERSE EFFECT OF IRON, INITIAL ENCOUNTER: Primary | ICD-10-CM

## 2019-01-01 DIAGNOSIS — L98.9 SKIN LESION OF NECK: Primary | ICD-10-CM

## 2019-01-01 DIAGNOSIS — K76.6 HYPERTENSION, PORTAL (HCC): ICD-10-CM

## 2019-01-01 DIAGNOSIS — L98.9 SKIN LESION: Primary | ICD-10-CM

## 2019-01-01 DIAGNOSIS — S01.81XA FACIAL LACERATION, INITIAL ENCOUNTER: Primary | ICD-10-CM

## 2019-01-01 DIAGNOSIS — R19.7 DIARRHEA, UNSPECIFIED TYPE: Primary | ICD-10-CM

## 2019-01-01 DIAGNOSIS — R06.02 SHORTNESS OF BREATH: ICD-10-CM

## 2019-01-01 DIAGNOSIS — D61.818 PANCYTOPENIA (HCC): ICD-10-CM

## 2019-01-01 DIAGNOSIS — I50.9 CONGESTIVE HEART FAILURE, UNSPECIFIED HF CHRONICITY, UNSPECIFIED HEART FAILURE TYPE (HCC): Primary | ICD-10-CM

## 2019-01-01 DIAGNOSIS — K74.69 OTHER CIRRHOSIS OF LIVER (HCC): ICD-10-CM

## 2019-01-01 DIAGNOSIS — R19.7 DIARRHEA, UNSPECIFIED TYPE: ICD-10-CM

## 2019-01-01 DIAGNOSIS — R91.1 LUNG NODULE, SOLITARY: ICD-10-CM

## 2019-01-01 DIAGNOSIS — Z01.818 PREOP TESTING: Primary | ICD-10-CM

## 2019-01-01 DIAGNOSIS — D50.0 IRON DEFICIENCY ANEMIA DUE TO CHRONIC BLOOD LOSS: Primary | ICD-10-CM
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
## 2019-01-01 DIAGNOSIS — I85.00 ESOPHAGEAL VARICES WITHOUT BLEEDING: ICD-10-CM

## 2019-01-01 DIAGNOSIS — C44.92 SQUAMOUS CELL CARCINOMA OF SKIN: Primary | ICD-10-CM

## 2019-01-01 DIAGNOSIS — R60.0 LOCALIZED EDEMA: Primary | ICD-10-CM

## 2019-01-01 DIAGNOSIS — K74.60 CIRRHOSIS OF LIVER WITH ASCITES, UNSPECIFIED HEPATIC CIRRHOSIS TYPE (HCC): Primary | ICD-10-CM

## 2019-01-01 DIAGNOSIS — L98.9 SKIN LESION OF BACK: ICD-10-CM

## 2019-01-01 DIAGNOSIS — K22.8 OTHER SPECIFIED DISEASES OF ESOPHAGUS: ICD-10-CM

## 2019-01-01 DIAGNOSIS — C82.02 FOLLICULAR LYMPHOMA GRADE I OF INTRATHORACIC LYMPH NODES (HCC): ICD-10-CM

## 2019-01-01 DIAGNOSIS — Z23 IMMUNIZATION DUE: ICD-10-CM

## 2019-01-01 DIAGNOSIS — L30.9 DERMATITIS: Primary | ICD-10-CM

## 2019-01-01 DIAGNOSIS — Z86.010 PERSONAL HISTORY OF COLONIC POLYPS: ICD-10-CM

## 2019-01-01 DIAGNOSIS — L98.9 SKIN LESION: ICD-10-CM

## 2019-01-01 DIAGNOSIS — S80.01XA CONTUSION OF RIGHT KNEE, INITIAL ENCOUNTER: ICD-10-CM

## 2019-01-01 DIAGNOSIS — Z85.79 PERSONAL HISTORY OF OTHER MALIGNANT NEOPLASMS OF LYMPHOID, HEMATOPOIETIC AND RELATED TISSUES: ICD-10-CM

## 2019-01-01 DIAGNOSIS — R18.8 CIRRHOSIS OF LIVER WITH ASCITES, UNSPECIFIED HEPATIC CIRRHOSIS TYPE (HCC): Primary | ICD-10-CM

## 2019-01-01 DIAGNOSIS — R53.83 FATIGUE, UNSPECIFIED TYPE: ICD-10-CM

## 2019-01-01 DIAGNOSIS — K57.30 DIVERTICULOSIS OF LARGE INTESTINE WITHOUT PERFORATION OR ABS: ICD-10-CM

## 2019-01-01 DIAGNOSIS — S70.01XA CONTUSION OF RIGHT HIP, INITIAL ENCOUNTER: ICD-10-CM

## 2019-01-01 DIAGNOSIS — L98.9 CHEST SKIN LESION: ICD-10-CM

## 2019-01-01 DIAGNOSIS — R53.83 FATIGUE, UNSPECIFIED TYPE: Primary | ICD-10-CM

## 2019-01-01 DIAGNOSIS — R06.02 SOB (SHORTNESS OF BREATH) ON EXERTION: Primary | ICD-10-CM

## 2019-01-01 DIAGNOSIS — K62.89 OTHER SPECIFIED DISEASES OF ANUS AND RECTUM: ICD-10-CM

## 2019-01-01 DIAGNOSIS — Z00.00 HEALTHCARE MAINTENANCE: ICD-10-CM

## 2019-01-01 DIAGNOSIS — Z12.31 SCREENING MAMMOGRAM, ENCOUNTER FOR: ICD-10-CM

## 2019-01-01 DIAGNOSIS — C82.02 FOLLICULAR LYMPHOMA GRADE I OF INTRATHORACIC LYMPH NODES (HCC): Primary | ICD-10-CM

## 2019-01-01 DIAGNOSIS — K31.89 OTHER DISEASES OF STOMACH AND DUODENUM: ICD-10-CM

## 2019-01-01 DIAGNOSIS — H92.03 OTALGIA OF BOTH EARS: Primary | ICD-10-CM

## 2019-01-01 DIAGNOSIS — K76.6 PORTAL HYPERTENSION: ICD-10-CM

## 2019-01-01 DIAGNOSIS — T45.4X5A ADVERSE EFFECT OF IRON, INITIAL ENCOUNTER: ICD-10-CM

## 2019-01-01 DIAGNOSIS — S40.011A CONTUSION OF MULTIPLE SITES OF RIGHT SHOULDER, INITIAL ENCOUNTER: ICD-10-CM

## 2019-01-01 LAB
ADV 40+41 DNA STL QL NAA+NON-PROBE: NOT DETECTED
ALBUMIN SERPL-MCNC: 2.9 G/DL (ref 3.5–4.8)
ALBUMIN SERPL-MCNC: 3 G/DL (ref 3.5–4.8)
ALBUMIN SERPL-MCNC: 3.3 G/DL (ref 3.5–5.2)
ALBUMIN SERPL-MCNC: 3.5 G/DL (ref 3.5–5.2)
ALBUMIN/GLOB SERPL: 0.9 G/DL (ref 1–1.7)
ALBUMIN/GLOB SERPL: 1.2 G/DL
ALBUMIN/GLOB SERPL: 1.2 {RATIO} (ref 1–1.7)
ALBUMIN/GLOB SERPL: 1.3 G/DL
ALP SERPL-CCNC: 59 IU/L (ref 32–91)
ALP SERPL-CCNC: 70 U/L (ref 39–117)
ALP SERPL-CCNC: 77 U/L (ref 32–91)
ALP SERPL-CCNC: 81 U/L (ref 39–117)
ALT SERPL W P-5'-P-CCNC: 12 U/L (ref 1–33)
ALT SERPL W P-5'-P-CCNC: 13 U/L (ref 14–54)
ALT SERPL W P-5'-P-CCNC: 8 U/L (ref 1–33)
ALT SERPL-CCNC: 12 IU/L (ref 14–54)
AMMONIA BLD-SCNC: 24 UMOL/L (ref 11–51)
AMMONIA BLD-SCNC: 26 UMOL/L (ref 9–35)
ANION GAP SERPL CALC-SCNC: 14.3 MMOL/L (ref 10–20)
ANION GAP SERPL CALC-SCNC: 16.1 MMOL/L (ref 10–20)
ANION GAP SERPL CALCULATED.3IONS-SCNC: 10.7 MMOL/L (ref 5–15)
ANION GAP SERPL CALCULATED.3IONS-SCNC: 11 MMOL/L (ref 5–15)
ANION GAP SERPL CALCULATED.3IONS-SCNC: 15.2 MMOL/L (ref 5–15)
ANION GAP SERPL CALCULATED.3IONS-SCNC: 15.4 MMOL/L (ref 5–15)
ANION GAP SERPL CALCULATED.3IONS-SCNC: 8 MMOL/L (ref 5–15)
APTT PPP: 20.9 SECONDS (ref 24–31)
AST SERPL-CCNC: 13 U/L (ref 1–32)
AST SERPL-CCNC: 13 U/L (ref 1–32)
AST SERPL-CCNC: 19 U/L (ref 15–41)
AST SERPL-CCNC: 22 IU/L (ref 15–41)
ASTRO TYP 1-8 RNA STL QL NAA+NON-PROBE: NOT DETECTED
BASOPHILS # BLD AUTO: 0 10*3/MM3 (ref 0–0.2)
BASOPHILS # BLD AUTO: 0 10*3/UL (ref 0–0.2)
BASOPHILS # BLD AUTO: 0 10*3/UL (ref 0–0.2)
BASOPHILS # BLD AUTO: 0.01 10*3/MM3 (ref 0–0.2)
BASOPHILS # BLD AUTO: 0.02 10*3/MM3 (ref 0–0.2)
BASOPHILS # BLD MANUAL: 0.05 10*3/MM3 (ref 0–0.2)
BASOPHILS NFR BLD AUTO: 0.3 % (ref 0–1.5)
BASOPHILS NFR BLD AUTO: 0.4 % (ref 0–1.5)
BASOPHILS NFR BLD AUTO: 0.4 % (ref 0–1.5)
BASOPHILS NFR BLD AUTO: 0.6 % (ref 0–1.5)
BASOPHILS NFR BLD AUTO: 1 % (ref 0–1.5)
BASOPHILS NFR BLD AUTO: 1 % (ref 0–2)
BASOPHILS NFR BLD AUTO: 1 % (ref 0–2)
BH CV ECHO MEAS - ACS: 1.8 CM
BH CV ECHO MEAS - AO MAX PG (FULL): 3.9 MMHG
BH CV ECHO MEAS - AO MAX PG: 8.4 MMHG
BH CV ECHO MEAS - AO MEAN PG (FULL): 2.3 MMHG
BH CV ECHO MEAS - AO MEAN PG: 4.6 MMHG
BH CV ECHO MEAS - AO ROOT AREA: 7 CM^2
BH CV ECHO MEAS - AO ROOT DIAM: 3 CM
BH CV ECHO MEAS - AO V2 MAX: 145.2 CM/SEC
BH CV ECHO MEAS - AO V2 MEAN: 103.2 CM/SEC
BH CV ECHO MEAS - AO V2 VTI: 21 CM
BH CV ECHO MEAS - ASC AORTA: 3.2 CM
BH CV ECHO MEAS - AVA(I,A): 2.4 CM^2
BH CV ECHO MEAS - AVA(I,D): 2.4 CM^2
BH CV ECHO MEAS - AVA(V,A): 2.5 CM^2
BH CV ECHO MEAS - AVA(V,D): 2.5 CM^2
BH CV ECHO MEAS - EDV(CUBED): 80.8 ML
BH CV ECHO MEAS - EDV(MOD-SP4): 78.8 ML
BH CV ECHO MEAS - EDV(TEICH): 84.1 ML
BH CV ECHO MEAS - EF(CUBED): 92.4 %
BH CV ECHO MEAS - EF(MOD-SP4): 66.8 %
BH CV ECHO MEAS - EF(TEICH): 87.9 %
BH CV ECHO MEAS - ESV(CUBED): 6.2 ML
BH CV ECHO MEAS - ESV(MOD-SP4): 26.2 ML
BH CV ECHO MEAS - ESV(TEICH): 10.2 ML
BH CV ECHO MEAS - FS: 57.6 %
BH CV ECHO MEAS - IVS/LVPW: 0.97
BH CV ECHO MEAS - IVSD: 0.98 CM
BH CV ECHO MEAS - LA DIMENSION: 4.1 CM
BH CV ECHO MEAS - LA/AO: 1.4
BH CV ECHO MEAS - LV MASS(C)D: 142.4 GRAMS
BH CV ECHO MEAS - LV MAX PG: 4.6 MMHG
BH CV ECHO MEAS - LV MEAN PG: 2.3 MMHG
BH CV ECHO MEAS - LV V1 MAX: 106.7 CM/SEC
BH CV ECHO MEAS - LV V1 MEAN: 71.8 CM/SEC
BH CV ECHO MEAS - LV V1 VTI: 14.9 CM
BH CV ECHO MEAS - LVIDD: 4.3 CM
BH CV ECHO MEAS - LVIDS: 1.8 CM
BH CV ECHO MEAS - LVOT AREA: 3.4 CM^2
BH CV ECHO MEAS - LVOT DIAM: 2.1 CM
BH CV ECHO MEAS - LVPWD: 1 CM
BH CV ECHO MEAS - MV A MAX VEL: 64.3 CM/SEC
BH CV ECHO MEAS - MV DEC SLOPE: 171.4 CM/SEC^2
BH CV ECHO MEAS - MV DEC TIME: 0.2 SEC
BH CV ECHO MEAS - MV E MAX VEL: 33.5 CM/SEC
BH CV ECHO MEAS - MV E/A: 0.52
BH CV ECHO MEAS - MV MAX PG: 3.1 MMHG
BH CV ECHO MEAS - MV MEAN PG: 1.2 MMHG
BH CV ECHO MEAS - MV V2 MAX: 87.8 CM/SEC
BH CV ECHO MEAS - MV V2 MEAN: 52.2 CM/SEC
BH CV ECHO MEAS - MV V2 VTI: 10.7 CM
BH CV ECHO MEAS - MVA(VTI): 4.8 CM^2
BH CV ECHO MEAS - PA ACC TIME: 0.08 SEC
BH CV ECHO MEAS - PA PR(ACCEL): 43.7 MMHG
BH CV ECHO MEAS - RAP SYSTOLE: 3 MMHG
BH CV ECHO MEAS - RV MAX PG: 7.4 MMHG
BH CV ECHO MEAS - RV MEAN PG: 4.3 MMHG
BH CV ECHO MEAS - RV V1 MAX: 135.8 CM/SEC
BH CV ECHO MEAS - RV V1 MEAN: 98 CM/SEC
BH CV ECHO MEAS - RV V1 VTI: 18.3 CM
BH CV ECHO MEAS - RVDD: 2.8 CM
BH CV ECHO MEAS - RVSP: 36.5 MMHG
BH CV ECHO MEAS - SV(AO): 147.3 ML
BH CV ECHO MEAS - SV(CUBED): 74.7 ML
BH CV ECHO MEAS - SV(LVOT): 51.1 ML
BH CV ECHO MEAS - SV(MOD-SP4): 52.6 ML
BH CV ECHO MEAS - SV(TEICH): 73.9 ML
BH CV ECHO MEAS - TR MAX VEL: 288.4 CM/SEC
BILIRUB SERPL-MCNC: 0.5 MG/DL (ref 0.2–1.2)
BILIRUB SERPL-MCNC: 0.5 MG/DL (ref 0.2–1.2)
BILIRUB SERPL-MCNC: 0.6 MG/DL (ref 0.3–1.2)
BILIRUB SERPL-MCNC: 0.6 MG/DL (ref 0.3–1.2)
BUN BLD-MCNC: 14 MG/DL (ref 8–23)
BUN BLD-MCNC: 15 MG/DL (ref 8–20)
BUN BLD-MCNC: 15 MG/DL (ref 8–23)
BUN BLD-MCNC: 15 MG/DL (ref 8–23)
BUN BLD-MCNC: 21 MG/DL (ref 8–20)
BUN SERPL-MCNC: 12 MG/DL (ref 8–20)
BUN SERPL-MCNC: 18 MG/DL (ref 8–20)
BUN/CREAT SERPL: 12 (ref 5.4–26.2)
BUN/CREAT SERPL: 12.5 (ref 5.4–26.2)
BUN/CREAT SERPL: 12.9 (ref 5.4–26.2)
BUN/CREAT SERPL: 13 (ref 7–25)
BUN/CREAT SERPL: 15 (ref 5.4–26.2)
BUN/CREAT SERPL: 15.2 (ref 7–25)
BUN/CREAT SERPL: 18.3 (ref 7–25)
C CAYETANENSIS DNA STL QL NAA+NON-PROBE: NOT DETECTED
CALCIUM SERPL-MCNC: 8.3 MG/DL (ref 8.9–10.3)
CALCIUM SERPL-MCNC: 8.3 MG/DL (ref 8.9–10.3)
CALCIUM SPEC-SCNC: 8.4 MG/DL (ref 8.9–10.3)
CALCIUM SPEC-SCNC: 8.5 MG/DL (ref 8.6–10.5)
CALCIUM SPEC-SCNC: 8.5 MG/DL (ref 8.9–10.3)
CALCIUM SPEC-SCNC: 8.7 MG/DL (ref 8.6–10.5)
CALCIUM SPEC-SCNC: 8.8 MG/DL (ref 8.6–10.5)
CAMPY SP DNA.DIARRHEA STL QL NAA+PROBE: NOT DETECTED
CHLORIDE SERPL-SCNC: 101 MMOL/L (ref 101–111)
CHLORIDE SERPL-SCNC: 101 MMOL/L (ref 101–111)
CHLORIDE SERPL-SCNC: 101 MMOL/L (ref 98–107)
CHLORIDE SERPL-SCNC: 103 MMOL/L (ref 101–111)
CHLORIDE SERPL-SCNC: 97 MMOL/L (ref 98–107)
CHLORIDE SERPL-SCNC: 98 MMOL/L (ref 101–111)
CHLORIDE SERPL-SCNC: 99 MMOL/L (ref 98–107)
CHOLEST SERPL-MCNC: 143 MG/DL
CHOLEST/HDLC SERPL: 4.1 {RATIO}
CO2 SERPL-SCNC: 22 MMOL/L (ref 22–32)
CO2 SERPL-SCNC: 24 MMOL/L (ref 22–32)
CO2 SERPL-SCNC: 26.3 MMOL/L (ref 22–29)
CO2 SERPL-SCNC: 27 MMOL/L (ref 22–29)
CO2 SERPL-SCNC: 30 MMOL/L (ref 22–29)
CONV CO2: 24 MMOL/L (ref 22–32)
CONV CO2: 25 MMOL/L (ref 22–32)
CONV LDL CHOLESTEROL DIRECT: 101 MG/DL (ref 0–100)
CONV TOTAL PROTEIN: 5.6 G/DL (ref 6.1–7.9)
CREAT BLD-MCNC: 0.82 MG/DL (ref 0.57–1)
CREAT BLD-MCNC: 0.99 MG/DL (ref 0.57–1)
CREAT BLD-MCNC: 1.08 MG/DL (ref 0.57–1)
CREAT BLD-MCNC: 1.2 MG/DL (ref 0.4–1)
CREAT BLD-MCNC: 1.4 MG/DL (ref 0.4–1)
CREAT BLDA-MCNC: 1 MG/DL (ref 0.6–1.3)
CREAT UR-MCNC: 1 MG/DL (ref 0.4–1)
CREAT UR-MCNC: 1.4 MG/DL (ref 0.4–1)
CRYPTOSP STL CULT: NOT DETECTED
CYTO UR: NORMAL
DEPRECATED RDW RBC AUTO: 45.9 FL (ref 37–54)
DEPRECATED RDW RBC AUTO: 48.8 FL (ref 37–54)
DEPRECATED RDW RBC AUTO: 50.8 FL (ref 37–54)
DEPRECATED RDW RBC AUTO: 51.3 FL (ref 37–54)
DEPRECATED RDW RBC AUTO: 51.7 FL (ref 37–54)
DEPRECATED RDW RBC AUTO: 52.1 FL (ref 37–54)
DEPRECATED RDW RBC AUTO: 52.2 FL (ref 37–54)
DEPRECATED RDW RBC AUTO: 52.5 FL (ref 37–54)
DEPRECATED RDW RBC AUTO: 53.5 FL (ref 37–54)
DEPRECATED RDW RBC AUTO: 56.8 FL (ref 37–54)
DEPRECATED RDW RBC AUTO: 63.3 FL (ref 37–54)
DIFFERENTIAL METHOD BLD: (no result)
DIFFERENTIAL METHOD BLD: (no result)
E COLI DNA SPEC QL NAA+PROBE: NOT DETECTED
E HISTOLYT AG STL-ACNC: NOT DETECTED
EAEC PAA PLAS AGGR+AATA ST NAA+NON-PRB: NOT DETECTED
EC STX1 + STX2 GENES STL NAA+PROBE: NOT DETECTED
EOSINOPHIL # BLD AUTO: 0 10*3/MM3 (ref 0–0.4)
EOSINOPHIL # BLD AUTO: 0 10*3/UL (ref 0–0.3)
EOSINOPHIL # BLD AUTO: 0 10*3/UL (ref 0–0.3)
EOSINOPHIL # BLD AUTO: 0.02 10*3/MM3 (ref 0–0.4)
EOSINOPHIL # BLD AUTO: 0.02 10*3/MM3 (ref 0–0.4)
EOSINOPHIL # BLD AUTO: 0.03 10*3/MM3 (ref 0–0.4)
EOSINOPHIL # BLD AUTO: 0.04 10*3/MM3 (ref 0–0.4)
EOSINOPHIL # BLD AUTO: 0.04 10*3/MM3 (ref 0–0.4)
EOSINOPHIL # BLD AUTO: 0.05 10*3/MM3 (ref 0–0.4)
EOSINOPHIL # BLD AUTO: 1 % (ref 0–3)
EOSINOPHIL # BLD AUTO: 1 % (ref 0–3)
EOSINOPHIL # BLD MANUAL: 0.09 10*3/MM3 (ref 0–0.4)
EOSINOPHIL NFR BLD AUTO: 0.6 % (ref 0.3–6.2)
EOSINOPHIL NFR BLD AUTO: 0.7 % (ref 0.3–6.2)
EOSINOPHIL NFR BLD AUTO: 0.9 % (ref 0.3–6.2)
EOSINOPHIL NFR BLD AUTO: 1 % (ref 0.3–6.2)
EOSINOPHIL NFR BLD AUTO: 1 % (ref 0.3–6.2)
EOSINOPHIL NFR BLD AUTO: 1.5 % (ref 0.3–6.2)
EOSINOPHIL NFR BLD AUTO: 1.6 % (ref 0.3–6.2)
EOSINOPHIL NFR BLD AUTO: 1.6 % (ref 0.3–6.2)
EOSINOPHIL NFR BLD AUTO: 1.7 % (ref 0.3–6.2)
EOSINOPHIL NFR BLD MANUAL: 2 % (ref 0.3–6.2)
EPEC EAE GENE STL QL NAA+NON-PROBE: NOT DETECTED
ERYTHROCYTE [DISTWIDTH] IN BLOOD BY AUTOMATED COUNT: 14.1 % (ref 12.3–15.4)
ERYTHROCYTE [DISTWIDTH] IN BLOOD BY AUTOMATED COUNT: 15 % (ref 12.3–15.4)
ERYTHROCYTE [DISTWIDTH] IN BLOOD BY AUTOMATED COUNT: 15.8 % (ref 12.3–15.4)
ERYTHROCYTE [DISTWIDTH] IN BLOOD BY AUTOMATED COUNT: 15.8 % (ref 12.3–15.4)
ERYTHROCYTE [DISTWIDTH] IN BLOOD BY AUTOMATED COUNT: 16.3 % (ref 12.3–15.4)
ERYTHROCYTE [DISTWIDTH] IN BLOOD BY AUTOMATED COUNT: 17.1 % (ref 12.3–15.4)
ERYTHROCYTE [DISTWIDTH] IN BLOOD BY AUTOMATED COUNT: 17.9 % (ref 11.5–14.5)
ERYTHROCYTE [DISTWIDTH] IN BLOOD BY AUTOMATED COUNT: 17.9 % (ref 12.3–15.4)
ERYTHROCYTE [DISTWIDTH] IN BLOOD BY AUTOMATED COUNT: 18 % (ref 11.5–14.5)
ERYTHROCYTE [DISTWIDTH] IN BLOOD BY AUTOMATED COUNT: 18.4 % (ref 12.3–15.4)
ERYTHROCYTE [DISTWIDTH] IN BLOOD BY AUTOMATED COUNT: 18.5 % (ref 12.3–15.4)
ERYTHROCYTE [DISTWIDTH] IN BLOOD BY AUTOMATED COUNT: 18.7 % (ref 12.3–15.4)
ERYTHROCYTE [DISTWIDTH] IN BLOOD BY AUTOMATED COUNT: 19.8 % (ref 12.3–15.4)
ETEC LTA+ST1A+ST1B TOX ST NAA+NON-PROBE: NOT DETECTED
FERRITIN SERPL-MCNC: 320 NG/ML (ref 11–307)
FERRITIN SERPL-MCNC: 695.6 NG/ML (ref 13–150)
G LAMBLIA DNA SPEC QL NAA+PROBE: NOT DETECTED
GFR SERPL CREATININE-BSD FRML MDRD: 37 ML/MIN/1.73
GFR SERPL CREATININE-BSD FRML MDRD: 45 ML/MIN/1.73
GFR SERPL CREATININE-BSD FRML MDRD: 50 ML/MIN/1.73
GFR SERPL CREATININE-BSD FRML MDRD: 56 ML/MIN/1.73
GFR SERPL CREATININE-BSD FRML MDRD: 69 ML/MIN/1.73
GI HISTOLOGY: A. UNSPECIFIED: (no result)
GI HISTOLOGY: B. SELECT: (no result)
GI HISTOLOGY: PDF REPORT: (no result)
GLOBULIN UR ELPH-MCNC: 2.5 GM/DL
GLOBULIN UR ELPH-MCNC: 2.6 G/DL (ref 2.5–3.8)
GLOBULIN UR ELPH-MCNC: 2.9 GM/DL
GLOBULIN UR ELPH-MCNC: 3.3 GM/DL (ref 2.5–3.8)
GLUCOSE BLD-MCNC: 133 MG/DL (ref 65–99)
GLUCOSE BLD-MCNC: 85 MG/DL (ref 65–99)
GLUCOSE BLD-MCNC: 89 MG/DL (ref 65–99)
GLUCOSE BLD-MCNC: 96 MG/DL (ref 65–99)
GLUCOSE BLD-MCNC: 97 MG/DL (ref 65–99)
GLUCOSE SERPL-MCNC: 110 MG/DL (ref 65–99)
GLUCOSE SERPL-MCNC: 119 MG/DL (ref 65–99)
HBA1C MFR BLD: 5 % (ref 0–5.6)
HCT VFR BLD AUTO: 24.6 % (ref 34–46.6)
HCT VFR BLD AUTO: 24.9 % (ref 34–46.6)
HCT VFR BLD AUTO: 26.8 % (ref 34–46.6)
HCT VFR BLD AUTO: 26.9 % (ref 34–46.6)
HCT VFR BLD AUTO: 27.7 % (ref 35–49)
HCT VFR BLD AUTO: 28.3 % (ref 35–49)
HCT VFR BLD AUTO: 28.8 % (ref 34–46.6)
HCT VFR BLD AUTO: 29 % (ref 34–46.6)
HCT VFR BLD AUTO: 30.7 % (ref 34–46.6)
HCT VFR BLD AUTO: 32.6 % (ref 34–46.6)
HCT VFR BLD AUTO: 32.7 % (ref 34–46.6)
HCT VFR BLD AUTO: 33 % (ref 34–46.6)
HCT VFR BLD AUTO: 33.2 % (ref 34–46.6)
HDLC SERPL-MCNC: 35 MG/DL
HGB BLD-MCNC: 10 G/DL (ref 12–15.9)
HGB BLD-MCNC: 10.1 G/DL (ref 12–15.9)
HGB BLD-MCNC: 10.2 G/DL (ref 12–15.9)
HGB BLD-MCNC: 10.2 G/DL (ref 12–15.9)
HGB BLD-MCNC: 7.1 G/DL (ref 12–15.9)
HGB BLD-MCNC: 7.4 G/DL (ref 12–15.9)
HGB BLD-MCNC: 7.9 G/DL (ref 12–15.9)
HGB BLD-MCNC: 8.2 G/DL (ref 12–15.9)
HGB BLD-MCNC: 8.4 G/DL (ref 12–15.9)
HGB BLD-MCNC: 8.7 G/DL (ref 12–15)
HGB BLD-MCNC: 8.7 G/DL (ref 12–15.9)
HGB BLD-MCNC: 8.8 G/DL (ref 12–15)
HGB BLD-MCNC: 9.6 G/DL (ref 12–15.9)
IMM GRANULOCYTES # BLD AUTO: 0.01 10*3/MM3 (ref 0–0.05)
IMM GRANULOCYTES NFR BLD AUTO: 0.3 % (ref 0–0.5)
INR PPP: 1.16 (ref 0.9–1.1)
INR PPP: 1.21 (ref 0.9–1.1)
INR PPP: 1.27 (ref 0.9–1.1)
IRON 24H UR-MRATE: 20 MCG/DL (ref 37–145)
IRON 24H UR-MRATE: 25 MCG/DL (ref 28–170)
IRON SATN MFR SERPL: 10 % (ref 20–50)
IRON SATN MFR SERPL: 11 % (ref 15–50)
KARYOTYP MAR: NORMAL
LAB AP CASE REPORT: NORMAL
LAB AP CASE REPORT: NORMAL
LAB AP DIAGNOSIS COMMENT: NORMAL
LDLC/HDLC SERPL: 2.9 {RATIO}
LIPID INTERPRETATION: ABNORMAL
LV EF 2D ECHO EST: 55 %
LYMPHOCYTES # BLD AUTO: 0.57 10*3/MM3 (ref 0.7–3.1)
LYMPHOCYTES # BLD AUTO: 0.6 10*3/MM3 (ref 0.7–3.1)
LYMPHOCYTES # BLD AUTO: 0.63 10*3/MM3 (ref 0.7–3.1)
LYMPHOCYTES # BLD AUTO: 0.72 10*3/MM3 (ref 0.7–3.1)
LYMPHOCYTES # BLD AUTO: 0.72 10*3/MM3 (ref 0.7–3.1)
LYMPHOCYTES # BLD AUTO: 0.74 10*3/MM3 (ref 0.7–3.1)
LYMPHOCYTES # BLD AUTO: 0.8 10*3/UL (ref 0.8–4.8)
LYMPHOCYTES # BLD AUTO: 0.88 10*3/MM3 (ref 0.7–3.1)
LYMPHOCYTES # BLD AUTO: 0.89 10*3/MM3 (ref 0.7–3.1)
LYMPHOCYTES # BLD AUTO: 0.95 10*3/MM3 (ref 0.7–3.1)
LYMPHOCYTES # BLD AUTO: 1 10*3/UL (ref 0.8–4.8)
LYMPHOCYTES # BLD MANUAL: 0.92 10*3/MM3 (ref 0.7–3.1)
LYMPHOCYTES NFR BLD AUTO: 17.4 % (ref 19.6–45.3)
LYMPHOCYTES NFR BLD AUTO: 22.7 % (ref 19.6–45.3)
LYMPHOCYTES NFR BLD AUTO: 22.9 % (ref 19.6–45.3)
LYMPHOCYTES NFR BLD AUTO: 24 % (ref 19.6–45.3)
LYMPHOCYTES NFR BLD AUTO: 25.9 % (ref 19.6–45.3)
LYMPHOCYTES NFR BLD AUTO: 26.5 % (ref 19.6–45.3)
LYMPHOCYTES NFR BLD AUTO: 26.7 % (ref 19.6–45.3)
LYMPHOCYTES NFR BLD AUTO: 27.5 % (ref 19.6–45.3)
LYMPHOCYTES NFR BLD AUTO: 29 % (ref 18–42)
LYMPHOCYTES NFR BLD AUTO: 29 % (ref 18–42)
LYMPHOCYTES NFR BLD AUTO: 35.6 % (ref 19.6–45.3)
LYMPHOCYTES NFR BLD MANUAL: 20 % (ref 19.6–45.3)
LYMPHOCYTES NFR BLD MANUAL: 7 % (ref 5–12)
MAXIMAL PREDICTED HEART RATE: 151 BPM
MCH RBC QN AUTO: 24.1 PG (ref 26.6–33)
MCH RBC QN AUTO: 24.3 PG (ref 26.6–33)
MCH RBC QN AUTO: 24.3 PG (ref 26.6–33)
MCH RBC QN AUTO: 24.5 PG (ref 26.6–33)
MCH RBC QN AUTO: 25.2 PG (ref 26.6–33)
MCH RBC QN AUTO: 26 PG (ref 26–32)
MCH RBC QN AUTO: 26.3 PG (ref 26.6–33)
MCH RBC QN AUTO: 26.7 PG (ref 26–32)
MCH RBC QN AUTO: 27.9 PG (ref 26.6–33)
MCH RBC QN AUTO: 28.1 PG (ref 26.6–33)
MCH RBC QN AUTO: 28.3 PG (ref 26.6–33)
MCH RBC QN AUTO: 28.3 PG (ref 26.6–33)
MCH RBC QN AUTO: 28.9 PG (ref 26.6–33)
MCHC RBC AUTO-ENTMCNC: 28.3 G/DL (ref 31.5–35.7)
MCHC RBC AUTO-ENTMCNC: 28.5 G/DL (ref 31.5–35.7)
MCHC RBC AUTO-ENTMCNC: 29.2 G/DL (ref 31.5–35.7)
MCHC RBC AUTO-ENTMCNC: 29.4 G/DL (ref 31.5–35.7)
MCHC RBC AUTO-ENTMCNC: 30.1 G/DL (ref 31.5–35.7)
MCHC RBC AUTO-ENTMCNC: 30.4 G/DL (ref 31.5–35.7)
MCHC RBC AUTO-ENTMCNC: 30.6 G/DL (ref 31.5–35.7)
MCHC RBC AUTO-ENTMCNC: 30.7 G/DL (ref 31.5–35.7)
MCHC RBC AUTO-ENTMCNC: 30.7 G/DL (ref 32–36)
MCHC RBC AUTO-ENTMCNC: 30.9 G/DL (ref 31.5–35.7)
MCHC RBC AUTO-ENTMCNC: 31.2 G/DL (ref 31.5–35.7)
MCHC RBC AUTO-ENTMCNC: 31.8 G/DL (ref 32–36)
MCHC RBC AUTO-ENTMCNC: 32.9 G/DL (ref 31.5–35.7)
MCV RBC AUTO: 79.4 FL (ref 79–97)
MCV RBC AUTO: 80.5 FL (ref 79–97)
MCV RBC AUTO: 84 FL (ref 80–94)
MCV RBC AUTO: 84.6 FL (ref 80–94)
MCV RBC AUTO: 85 FL (ref 79–97)
MCV RBC AUTO: 85.7 FL (ref 79–97)
MCV RBC AUTO: 85.9 FL (ref 79–97)
MCV RBC AUTO: 87.9 FL (ref 79–97)
MCV RBC AUTO: 89.3 FL (ref 79–97)
MCV RBC AUTO: 90 FL (ref 79–97)
MCV RBC AUTO: 91.7 FL (ref 79–97)
MCV RBC AUTO: 92.4 FL (ref 79–97)
MCV RBC AUTO: 92.5 FL (ref 79–97)
MONOCYTES # BLD AUTO: 0.2 10*3/MM3 (ref 0.1–0.9)
MONOCYTES # BLD AUTO: 0.25 10*3/MM3 (ref 0.1–0.9)
MONOCYTES # BLD AUTO: 0.29 10*3/MM3 (ref 0.1–0.9)
MONOCYTES # BLD AUTO: 0.3 10*3/MM3 (ref 0.1–0.9)
MONOCYTES # BLD AUTO: 0.3 10*3/MM3 (ref 0.1–0.9)
MONOCYTES # BLD AUTO: 0.31 10*3/MM3 (ref 0.1–0.9)
MONOCYTES # BLD AUTO: 0.32 10*3/MM3 (ref 0.1–0.9)
MONOCYTES # BLD AUTO: 0.35 10*3/MM3 (ref 0.1–0.9)
MONOCYTES # BLD AUTO: 0.4 10*3/UL (ref 0.1–1.3)
MONOCYTES # BLD AUTO: 0.4 10*3/UL (ref 0.1–1.3)
MONOCYTES NFR BLD AUTO: 10 % (ref 5–12)
MONOCYTES NFR BLD AUTO: 10.3 % (ref 5–12)
MONOCYTES NFR BLD AUTO: 10.5 % (ref 5–12)
MONOCYTES NFR BLD AUTO: 11.7 % (ref 5–12)
MONOCYTES NFR BLD AUTO: 12 % (ref 2–11)
MONOCYTES NFR BLD AUTO: 12 % (ref 2–11)
MONOCYTES NFR BLD AUTO: 12.2 % (ref 5–12)
MONOCYTES NFR BLD AUTO: 8.7 % (ref 5–12)
MONOCYTES NFR BLD AUTO: 9.3 % (ref 5–12)
MONOCYTES NFR BLD AUTO: 9.6 % (ref 5–12)
MONOCYTES NFR BLD AUTO: 9.6 % (ref 5–12)
NEUTROPHILS # BLD AUTO: 1.25 10*3/MM3 (ref 1.7–7)
NEUTROPHILS # BLD AUTO: 1.4 10*3/MM3 (ref 1.7–7)
NEUTROPHILS # BLD AUTO: 1.64 10*3/MM3 (ref 1.7–7)
NEUTROPHILS # BLD AUTO: 1.7 10*3/UL (ref 2.3–8.6)
NEUTROPHILS # BLD AUTO: 1.74 10*3/MM3 (ref 1.7–7)
NEUTROPHILS # BLD AUTO: 1.9 10*3/UL (ref 2.3–8.6)
NEUTROPHILS # BLD AUTO: 1.93 10*3/MM3 (ref 1.7–7)
NEUTROPHILS # BLD AUTO: 2.03 10*3/MM3 (ref 1.7–7)
NEUTROPHILS # BLD AUTO: 2.08 10*3/MM3 (ref 1.7–7)
NEUTROPHILS # BLD AUTO: 2.17 10*3/MM3 (ref 1.7–7)
NEUTROPHILS # BLD AUTO: 2.6 10*3/MM3 (ref 1.7–7)
NEUTROPHILS # BLD AUTO: 3.22 10*3/MM3 (ref 1.7–7)
NEUTROPHILS NFR BLD AUTO: 50.7 % (ref 42.7–76)
NEUTROPHILS NFR BLD AUTO: 57 % (ref 50–75)
NEUTROPHILS NFR BLD AUTO: 57 % (ref 50–75)
NEUTROPHILS NFR BLD AUTO: 60.9 % (ref 42.7–76)
NEUTROPHILS NFR BLD AUTO: 61 % (ref 42.7–76)
NEUTROPHILS NFR BLD AUTO: 62.2 % (ref 42.7–76)
NEUTROPHILS NFR BLD AUTO: 62.6 % (ref 42.7–76)
NEUTROPHILS NFR BLD AUTO: 64.4 % (ref 42.7–76)
NEUTROPHILS NFR BLD AUTO: 65.3 % (ref 42.7–76)
NEUTROPHILS NFR BLD AUTO: 66.2 % (ref 42.7–76)
NEUTROPHILS NFR BLD AUTO: 71.5 % (ref 42.7–76)
NEUTROPHILS NFR BLD MANUAL: 59 % (ref 42.7–76)
NEUTS BAND NFR BLD MANUAL: 11 % (ref 0–5)
NOROVIRUS GI+II RNA STL QL NAA+NON-PROBE: NOT DETECTED
NRBC BLD AUTO-RTO: 0 /100 WBC (ref 0–0.2)
NRBC BLD AUTO-RTO: 0 /100{WBCS}
NRBC BLD AUTO-RTO: 0 /100{WBCS}
NRBC BLD AUTO-RTO: 0.1 /100 WBC (ref 0–0.2)
NRBC/RBC NFR BLD MANUAL: 0 10*3/UL
NRBC/RBC NFR BLD MANUAL: 0 10*3/UL
P SHIGELLOIDES DNA STL QL NAA+PROBE: NOT DETECTED
PATH REPORT.FINAL DX SPEC: NORMAL
PATH REPORT.FINAL DX SPEC: NORMAL
PATH REPORT.GROSS SPEC: NORMAL
PATH REPORT.GROSS SPEC: NORMAL
PLAT MORPH BLD: NORMAL
PLATELET # BLD AUTO: 114 10*3/MM3 (ref 140–450)
PLATELET # BLD AUTO: 115 10*3/MM3 (ref 140–450)
PLATELET # BLD AUTO: 116 10*3/MM3 (ref 140–450)
PLATELET # BLD AUTO: 120 10*3/MM3 (ref 140–450)
PLATELET # BLD AUTO: 124 10*3/MM3 (ref 140–450)
PLATELET # BLD AUTO: 130 10*3/UL (ref 150–450)
PLATELET # BLD AUTO: 136 10*3/UL (ref 150–450)
PLATELET # BLD AUTO: 137 10*3/MM3 (ref 140–450)
PLATELET # BLD AUTO: 156 10*3/MM3 (ref 140–450)
PLATELET # BLD AUTO: 85 10*3/MM3 (ref 140–450)
PLATELET # BLD AUTO: 89 10*3/MM3 (ref 140–450)
PLATELET # BLD AUTO: 94 10*3/MM3 (ref 140–450)
PLATELET # BLD AUTO: 95 10*3/MM3 (ref 140–450)
PMV BLD AUTO: 11.1 FL (ref 6–12)
PMV BLD AUTO: 6.8 FL (ref 6–12)
PMV BLD AUTO: 6.9 FL (ref 6–12)
PMV BLD AUTO: 7.6 FL (ref 7.4–10.4)
PMV BLD AUTO: 7.6 FL (ref 7.4–10.4)
PMV BLD AUTO: 7.7 FL (ref 6–12)
PMV BLD AUTO: 7.9 FL (ref 6–12)
PMV BLD AUTO: 7.9 FL (ref 6–12)
PMV BLD AUTO: 8.3 FL (ref 6–12)
PMV BLD AUTO: 8.5 FL (ref 6–12)
PMV BLD AUTO: 8.6 FL (ref 6–12)
PMV BLD AUTO: 8.8 FL (ref 6–12)
PMV BLD AUTO: 8.9 FL (ref 6–12)
POLYCHROMASIA BLD QL SMEAR: ABNORMAL
POTASSIUM BLD-SCNC: 3.7 MMOL/L (ref 3.5–5.2)
POTASSIUM BLD-SCNC: 3.8 MMOL/L (ref 3.5–5.2)
POTASSIUM BLD-SCNC: 3.9 MMOL/L (ref 3.5–5.2)
POTASSIUM BLD-SCNC: 4.2 MMOL/L (ref 3.6–5.1)
POTASSIUM BLD-SCNC: 4.4 MMOL/L (ref 3.6–5.1)
POTASSIUM SERPL-SCNC: 4.1 MMOL/L (ref 3.6–5.1)
POTASSIUM SERPL-SCNC: 4.3 MMOL/L (ref 3.6–5.1)
PROT SERPL-MCNC: 5.8 G/DL (ref 6–8.5)
PROT SERPL-MCNC: 6.2 G/DL (ref 6.1–7.9)
PROT SERPL-MCNC: 6.4 G/DL (ref 6–8.5)
PROTHROMBIN TIME: 11.7 SECONDS (ref 9.6–11.7)
PROTHROMBIN TIME: 12.3 SECONDS (ref 9.6–11.7)
PROTHROMBIN TIME: 12.8 SECONDS (ref 9.6–11.7)
RBC # BLD AUTO: 2.9 10*6/MM3 (ref 3.77–5.28)
RBC # BLD AUTO: 3.06 10*6/MM3 (ref 3.77–5.28)
RBC # BLD AUTO: 3.14 10*6/MM3 (ref 3.77–5.28)
RBC # BLD AUTO: 3.2 10*6/MM3 (ref 3.77–5.28)
RBC # BLD AUTO: 3.29 10*6/UL (ref 4–5.4)
RBC # BLD AUTO: 3.3 10*6/MM3 (ref 3.77–5.28)
RBC # BLD AUTO: 3.35 10*6/UL (ref 4–5.4)
RBC # BLD AUTO: 3.38 10*6/MM3 (ref 3.77–5.28)
RBC # BLD AUTO: 3.53 10*6/MM3 (ref 3.77–5.28)
RBC # BLD AUTO: 3.59 10*6/MM3 (ref 3.77–5.28)
RBC # BLD AUTO: 3.6 10*6/MM3 (ref 3.77–5.28)
RBC # BLD AUTO: 3.61 10*6/MM3 (ref 3.77–5.28)
RBC # BLD AUTO: 3.66 10*6/MM3 (ref 3.77–5.28)
REF LAB TEST METHOD: NORMAL
RV RNA STL NAA+PROBE: NOT DETECTED
SALMONELLA DNA SPEC QL NAA+PROBE: NOT DETECTED
SAPO I+II+IV+V RNA STL QL NAA+NON-PROBE: NOT DETECTED
SCAN SLIDE: NORMAL
SHIGELLA SP+EIEC IPAH STL QL NAA+PROBE: NOT DETECTED
SODIUM BLD-SCNC: 133 MMOL/L (ref 136–144)
SODIUM BLD-SCNC: 134 MMOL/L (ref 136–145)
SODIUM BLD-SCNC: 136 MMOL/L (ref 136–144)
SODIUM BLD-SCNC: 136 MMOL/L (ref 136–145)
SODIUM BLD-SCNC: 140 MMOL/L (ref 136–145)
SODIUM SERPL-SCNC: 135 MMOL/L (ref 136–144)
SODIUM SERPL-SCNC: 138 MMOL/L (ref 136–144)
STRESS TARGET HR: 128 BPM
TIBC SERPL-MCNC: 207 MCG/DL (ref 298–536)
TIBC SERPL-MCNC: 224 MCG/DL (ref 228–428)
TRANSFERRIN SERPL-MCNC: 139 MG/DL (ref 200–360)
TRANSFERRIN SERPL-MCNC: 160 MG/DL (ref 190–380)
TRIGL SERPL-MCNC: 126 MG/DL
TSH SERPL-ACNC: 3.57 UIU/ML (ref 0.34–5.6)
V CHOLERAE DNA SPEC QL NAA+PROBE: NOT DETECTED
VIBRIO DNA SPEC NAA+PROBE: NOT DETECTED
VLDLC SERPL CALC-MCNC: 7.8 MG/DL
WBC # BLD AUTO: 2.9 10*3/UL (ref 4.5–11.5)
WBC # BLD AUTO: 3.4 10*3/UL (ref 4.5–11.5)
WBC MORPH BLD: NORMAL
WBC NRBC COR # BLD: 2 10*3/MM3 (ref 3.4–10.8)
WBC NRBC COR # BLD: 2.3 10*3/MM3 (ref 3.4–10.8)
WBC NRBC COR # BLD: 2.47 10*3/MM3 (ref 3.4–10.8)
WBC NRBC COR # BLD: 2.51 10*3/MM3 (ref 3.4–10.8)
WBC NRBC COR # BLD: 2.86 10*3/MM3 (ref 3.4–10.8)
WBC NRBC COR # BLD: 3 10*3/MM3 (ref 3.4–10.8)
WBC NRBC COR # BLD: 3.14 10*3/MM3 (ref 3.4–10.8)
WBC NRBC COR # BLD: 3.33 10*3/MM3 (ref 3.4–10.8)
WBC NRBC COR # BLD: 3.46 10*3/MM3 (ref 3.4–10.8)
WBC NRBC COR # BLD: 3.63 10*3/MM3 (ref 3.4–10.8)
WBC NRBC COR # BLD: 4.6 10*3/MM3 (ref 3.4–10.8)
YERSINIA STL CULT: NOT DETECTED

## 2019-01-01 PROCEDURE — 99212 OFFICE O/P EST SF 10 MIN: CPT | Performed by: SURGERY

## 2019-01-01 PROCEDURE — 99215 OFFICE O/P EST HI 40 MIN: CPT | Performed by: INTERNAL MEDICINE

## 2019-01-01 PROCEDURE — 88312 SPECIAL STAINS GROUP 1: CPT | Performed by: SURGERY

## 2019-01-01 PROCEDURE — 36415 COLL VENOUS BLD VENIPUNCTURE: CPT

## 2019-01-01 PROCEDURE — 25010000002 FERUMOXYTOL 510 MG/17ML SOLUTION 510 MG VIAL: Performed by: INTERNAL MEDICINE

## 2019-01-01 PROCEDURE — 82728 ASSAY OF FERRITIN: CPT | Performed by: NURSE PRACTITIONER

## 2019-01-01 PROCEDURE — 36415 COLL VENOUS BLD VENIPUNCTURE: CPT | Performed by: INTERNAL MEDICINE

## 2019-01-01 PROCEDURE — 88184 FLOWCYTOMETRY/ TC 1 MARKER: CPT

## 2019-01-01 PROCEDURE — 99024 POSTOP FOLLOW-UP VISIT: CPT | Performed by: SURGERY

## 2019-01-01 PROCEDURE — 85610 PROTHROMBIN TIME: CPT | Performed by: NURSE PRACTITIONER

## 2019-01-01 PROCEDURE — 96365 THER/PROPH/DIAG IV INF INIT: CPT

## 2019-01-01 PROCEDURE — 85025 COMPLETE CBC W/AUTO DIFF WBC: CPT | Performed by: INTERNAL MEDICINE

## 2019-01-01 PROCEDURE — 25010000002 PROPOFOL 200 MG/20ML EMULSION: Performed by: ANESTHESIOLOGIST ASSISTANT

## 2019-01-01 PROCEDURE — 49083 ABD PARACENTESIS W/IMAGING: CPT | Performed by: INTERNAL MEDICINE

## 2019-01-01 PROCEDURE — 74160 CT ABDOMEN W/CONTRAST: CPT

## 2019-01-01 PROCEDURE — 88313 SPECIAL STAINS GROUP 2: CPT | Performed by: NURSE PRACTITIONER

## 2019-01-01 PROCEDURE — 85610 PROTHROMBIN TIME: CPT | Performed by: INTERNAL MEDICINE

## 2019-01-01 PROCEDURE — 76942 ECHO GUIDE FOR BIOPSY: CPT

## 2019-01-01 PROCEDURE — 85025 COMPLETE CBC W/AUTO DIFF WBC: CPT | Performed by: RADIOLOGY

## 2019-01-01 PROCEDURE — 88305 TISSUE EXAM BY PATHOLOGIST: CPT | Performed by: NURSE PRACTITIONER

## 2019-01-01 PROCEDURE — 45380 COLONOSCOPY AND BIOPSY: CPT | Mod: 59 | Performed by: INTERNAL MEDICINE

## 2019-01-01 PROCEDURE — 93306 TTE W/DOPPLER COMPLETE: CPT

## 2019-01-01 PROCEDURE — 74177 CT ABD & PELVIS W/CONTRAST: CPT

## 2019-01-01 PROCEDURE — 96375 TX/PRO/DX INJ NEW DRUG ADDON: CPT | Performed by: INTERNAL MEDICINE

## 2019-01-01 PROCEDURE — 43244 EGD VARICES LIGATION: CPT | Performed by: INTERNAL MEDICINE

## 2019-01-01 PROCEDURE — 11422 EXC H-F-NK-SP B9+MARG 1.1-2: CPT | Performed by: SURGERY

## 2019-01-01 PROCEDURE — 25010000002 MIDAZOLAM PER 1 MG: Performed by: RADIOLOGY

## 2019-01-01 PROCEDURE — 12034 INTMD RPR S/TR/EXT 7.6-12.5: CPT | Performed by: SURGERY

## 2019-01-01 PROCEDURE — 80048 BASIC METABOLIC PNL TOTAL CA: CPT | Performed by: INTERNAL MEDICINE

## 2019-01-01 PROCEDURE — 80053 COMPREHEN METABOLIC PANEL: CPT | Performed by: SURGERY

## 2019-01-01 PROCEDURE — 99204 OFFICE O/P NEW MOD 45 MIN: CPT | Performed by: INTERNAL MEDICINE

## 2019-01-01 PROCEDURE — 0 IOPAMIDOL PER 1 ML: Performed by: INTERNAL MEDICINE

## 2019-01-01 PROCEDURE — 82565 ASSAY OF CREATININE: CPT

## 2019-01-01 PROCEDURE — 88305 TISSUE EXAM BY PATHOLOGIST: CPT | Performed by: SURGERY

## 2019-01-01 PROCEDURE — 71046 X-RAY EXAM CHEST 2 VIEWS: CPT

## 2019-01-01 PROCEDURE — 85025 COMPLETE CBC W/AUTO DIFF WBC: CPT | Performed by: SURGERY

## 2019-01-01 PROCEDURE — 25010000002 ONDANSETRON PER 1 MG: Performed by: ANESTHESIOLOGIST ASSISTANT

## 2019-01-01 PROCEDURE — 90674 CCIIV4 VAC NO PRSV 0.5 ML IM: CPT | Performed by: FAMILY MEDICINE

## 2019-01-01 PROCEDURE — 88262 CHROMOSOME ANALYSIS 15-20: CPT | Performed by: NURSE PRACTITIONER

## 2019-01-01 PROCEDURE — 25010000002 ALBUMIN HUMAN 25% PER 50 ML: Performed by: INTERNAL MEDICINE

## 2019-01-01 PROCEDURE — 70490 CT SOFT TISSUE NECK W/O DYE: CPT

## 2019-01-01 PROCEDURE — 88305 TISSUE EXAM BY PATHOLOGIST: CPT | Mod: 33 | Performed by: INTERNAL MEDICINE

## 2019-01-01 PROCEDURE — 25010000002 ALBUMIN HUMAN 25% PER 50 ML: Performed by: NURSE PRACTITIONER

## 2019-01-01 PROCEDURE — 99213 OFFICE O/P EST LOW 20 MIN: CPT | Performed by: INTERNAL MEDICINE

## 2019-01-01 PROCEDURE — 73560 X-RAY EXAM OF KNEE 1 OR 2: CPT

## 2019-01-01 PROCEDURE — 93005 ELECTROCARDIOGRAM TRACING: CPT

## 2019-01-01 PROCEDURE — 85027 COMPLETE CBC AUTOMATED: CPT | Performed by: NURSE PRACTITIONER

## 2019-01-01 PROCEDURE — 99283 EMERGENCY DEPT VISIT LOW MDM: CPT

## 2019-01-01 PROCEDURE — 88264 CHROMOSOME ANALYSIS 20-25: CPT | Performed by: NURSE PRACTITIONER

## 2019-01-01 PROCEDURE — 0097U HC BIOFIRE FILMARRAY GI PANEL: CPT | Performed by: FAMILY MEDICINE

## 2019-01-01 PROCEDURE — 25010000002 FENTANYL CITRATE (PF) 100 MCG/2ML SOLUTION: Performed by: ANESTHESIOLOGIST ASSISTANT

## 2019-01-01 PROCEDURE — 72170 X-RAY EXAM OF PELVIS: CPT

## 2019-01-01 PROCEDURE — G0008 ADMIN INFLUENZA VIRUS VAC: HCPCS | Performed by: FAMILY MEDICINE

## 2019-01-01 PROCEDURE — 96365 THER/PROPH/DIAG IV INF INIT: CPT | Performed by: INTERNAL MEDICINE

## 2019-01-01 PROCEDURE — P9047 ALBUMIN (HUMAN), 25%, 50ML: HCPCS | Performed by: INTERNAL MEDICINE

## 2019-01-01 PROCEDURE — 99213 OFFICE O/P EST LOW 20 MIN: CPT | Performed by: NURSE PRACTITIONER

## 2019-01-01 PROCEDURE — 99213 OFFICE O/P EST LOW 20 MIN: CPT | Performed by: FAMILY MEDICINE

## 2019-01-01 PROCEDURE — 45385 COLONOSCOPY W/LESION REMOVAL: CPT | Performed by: INTERNAL MEDICINE

## 2019-01-01 PROCEDURE — 25010000002 DEXAMETHASONE PER 1 MG: Performed by: ANESTHESIOLOGIST ASSISTANT

## 2019-01-01 PROCEDURE — 71260 CT THORAX DX C+: CPT

## 2019-01-01 PROCEDURE — 96366 THER/PROPH/DIAG IV INF ADDON: CPT

## 2019-01-01 PROCEDURE — 82140 ASSAY OF AMMONIA: CPT | Performed by: NURSE PRACTITIONER

## 2019-01-01 PROCEDURE — 85097 BONE MARROW INTERPRETATION: CPT | Performed by: NURSE PRACTITIONER

## 2019-01-01 PROCEDURE — 93010 ELECTROCARDIOGRAM REPORT: CPT | Performed by: INTERNAL MEDICINE

## 2019-01-01 PROCEDURE — 85025 COMPLETE CBC W/AUTO DIFF WBC: CPT | Performed by: NURSE PRACTITIONER

## 2019-01-01 PROCEDURE — 99203 OFFICE O/P NEW LOW 30 MIN: CPT | Performed by: SURGERY

## 2019-01-01 PROCEDURE — 80048 BASIC METABOLIC PNL TOTAL CA: CPT

## 2019-01-01 PROCEDURE — 73030 X-RAY EXAM OF SHOULDER: CPT

## 2019-01-01 PROCEDURE — P9047 ALBUMIN (HUMAN), 25%, 50ML: HCPCS | Performed by: NURSE PRACTITIONER

## 2019-01-01 PROCEDURE — 83540 ASSAY OF IRON: CPT | Performed by: NURSE PRACTITIONER

## 2019-01-01 PROCEDURE — 85007 BL SMEAR W/DIFF WBC COUNT: CPT | Performed by: SURGERY

## 2019-01-01 PROCEDURE — 72193 CT PELVIS W/DYE: CPT

## 2019-01-01 PROCEDURE — 93306 TTE W/DOPPLER COMPLETE: CPT | Performed by: INTERNAL MEDICINE

## 2019-01-01 PROCEDURE — 82728 ASSAY OF FERRITIN: CPT | Performed by: INTERNAL MEDICINE

## 2019-01-01 PROCEDURE — 88237 TISSUE CULTURE BONE MARROW: CPT

## 2019-01-01 PROCEDURE — 49082 ABD PARACENTESIS: CPT | Performed by: HOSPITALIST

## 2019-01-01 PROCEDURE — 0 IOPAMIDOL PER 1 ML: Performed by: NURSE PRACTITIONER

## 2019-01-01 PROCEDURE — 85730 THROMBOPLASTIN TIME PARTIAL: CPT | Performed by: RADIOLOGY

## 2019-01-01 PROCEDURE — 80053 COMPREHEN METABOLIC PANEL: CPT | Performed by: NURSE PRACTITIONER

## 2019-01-01 PROCEDURE — 96361 HYDRATE IV INFUSION ADD-ON: CPT

## 2019-01-01 PROCEDURE — 85610 PROTHROMBIN TIME: CPT | Performed by: RADIOLOGY

## 2019-01-01 PROCEDURE — 84466 ASSAY OF TRANSFERRIN: CPT | Performed by: INTERNAL MEDICINE

## 2019-01-01 PROCEDURE — 84466 ASSAY OF TRANSFERRIN: CPT | Performed by: NURSE PRACTITIONER

## 2019-01-01 PROCEDURE — 85025 COMPLETE CBC W/AUTO DIFF WBC: CPT

## 2019-01-01 PROCEDURE — 93005 ELECTROCARDIOGRAM TRACING: CPT | Performed by: UROLOGY

## 2019-01-01 PROCEDURE — 36415 COLL VENOUS BLD VENIPUNCTURE: CPT | Performed by: NURSE PRACTITIONER

## 2019-01-01 PROCEDURE — 83540 ASSAY OF IRON: CPT | Performed by: INTERNAL MEDICINE

## 2019-01-01 PROCEDURE — 43235 EGD DIAGNOSTIC BRUSH WASH: CPT | Performed by: INTERNAL MEDICINE

## 2019-01-01 PROCEDURE — 88311 DECALCIFY TISSUE: CPT | Performed by: NURSE PRACTITIONER

## 2019-01-01 PROCEDURE — 77002 NEEDLE LOCALIZATION BY XRAY: CPT

## 2019-01-01 PROCEDURE — 88185 FLOWCYTOMETRY/TC ADD-ON: CPT

## 2019-01-01 PROCEDURE — 11606 EXC TR-EXT MAL+MARG >4 CM: CPT | Performed by: SURGERY

## 2019-01-01 PROCEDURE — 25010000002 FENTANYL CITRATE (PF) 100 MCG/2ML SOLUTION: Performed by: RADIOLOGY

## 2019-01-01 RX ORDER — SODIUM CHLORIDE 0.9 % (FLUSH) 0.9 %
3-10 SYRINGE (ML) INJECTION AS NEEDED
Status: DISCONTINUED | OUTPATIENT
Start: 2019-01-01 | End: 2019-01-01 | Stop reason: HOSPADM

## 2019-01-01 RX ORDER — FUROSEMIDE 20 MG/1
20 TABLET ORAL 2 TIMES DAILY
Qty: 60 TABLET | Refills: 1 | Status: SHIPPED | OUTPATIENT
Start: 2019-01-01 | End: 2020-01-01 | Stop reason: SDUPTHER

## 2019-01-01 RX ORDER — CEPHALEXIN 500 MG/1
CAPSULE ORAL
Refills: 0 | COMMUNITY
Start: 2019-01-01 | End: 2019-01-01

## 2019-01-01 RX ORDER — HYDRALAZINE HYDROCHLORIDE 20 MG/ML
5 INJECTION INTRAMUSCULAR; INTRAVENOUS
Status: DISCONTINUED | OUTPATIENT
Start: 2019-01-01 | End: 2019-01-01 | Stop reason: HOSPADM

## 2019-01-01 RX ORDER — HYDROCODONE BITARTRATE AND ACETAMINOPHEN 5; 325 MG/1; MG/1
1 TABLET ORAL ONCE AS NEEDED
Status: DISCONTINUED | OUTPATIENT
Start: 2019-01-01 | End: 2019-01-01 | Stop reason: HOSPADM

## 2019-01-01 RX ORDER — NAPROXEN 375 MG/1
375 TABLET ORAL 2 TIMES DAILY PRN
Qty: 14 TABLET | Refills: 0 | Status: ON HOLD | OUTPATIENT
Start: 2019-01-01 | End: 2020-01-01 | Stop reason: ALTCHOICE

## 2019-01-01 RX ORDER — ALBUMIN (HUMAN) 12.5 G/50ML
12.5 SOLUTION INTRAVENOUS DAILY PRN
Status: CANCELLED | OUTPATIENT
Start: 2019-01-01

## 2019-01-01 RX ORDER — DIPHENHYDRAMINE HYDROCHLORIDE 50 MG/ML
12.5 INJECTION INTRAMUSCULAR; INTRAVENOUS
Status: DISCONTINUED | OUTPATIENT
Start: 2019-01-01 | End: 2019-01-01 | Stop reason: HOSPADM

## 2019-01-01 RX ORDER — LORAZEPAM 2 MG/ML
1 INJECTION INTRAMUSCULAR
Status: DISCONTINUED | OUTPATIENT
Start: 2019-01-01 | End: 2019-01-01 | Stop reason: HOSPADM

## 2019-01-01 RX ORDER — DEXAMETHASONE SODIUM PHOSPHATE 4 MG/ML
INJECTION, SOLUTION INTRA-ARTICULAR; INTRALESIONAL; INTRAMUSCULAR; INTRAVENOUS; SOFT TISSUE AS NEEDED
Status: DISCONTINUED | OUTPATIENT
Start: 2019-01-01 | End: 2019-01-01 | Stop reason: SURG

## 2019-01-01 RX ORDER — SODIUM CHLORIDE 9 MG/ML
9 INJECTION, SOLUTION INTRAVENOUS CONTINUOUS PRN
Status: DISCONTINUED | OUTPATIENT
Start: 2019-01-01 | End: 2019-01-01 | Stop reason: HOSPADM

## 2019-01-01 RX ORDER — PROMETHAZINE HYDROCHLORIDE 25 MG/1
25 TABLET ORAL ONCE AS NEEDED
Status: DISCONTINUED | OUTPATIENT
Start: 2019-01-01 | End: 2019-01-01 | Stop reason: HOSPADM

## 2019-01-01 RX ORDER — SODIUM CHLORIDE 9 MG/ML
250 INJECTION, SOLUTION INTRAVENOUS ONCE
Status: DISCONTINUED | OUTPATIENT
Start: 2019-01-01 | End: 2019-01-01 | Stop reason: HOSPADM

## 2019-01-01 RX ORDER — BUPIVACAINE HYDROCHLORIDE AND EPINEPHRINE 2.5; 5 MG/ML; UG/ML
INJECTION, SOLUTION EPIDURAL; INFILTRATION; INTRACAUDAL; PERINEURAL AS NEEDED
Status: DISCONTINUED | OUTPATIENT
Start: 2019-01-01 | End: 2019-01-01 | Stop reason: HOSPADM

## 2019-01-01 RX ORDER — HYDROMORPHONE HCL 110MG/55ML
0.5 PATIENT CONTROLLED ANALGESIA SYRINGE INTRAVENOUS
Status: DISCONTINUED | OUTPATIENT
Start: 2019-01-01 | End: 2019-01-01 | Stop reason: HOSPADM

## 2019-01-01 RX ORDER — IPRATROPIUM BROMIDE AND ALBUTEROL SULFATE 2.5; .5 MG/3ML; MG/3ML
3 SOLUTION RESPIRATORY (INHALATION) ONCE AS NEEDED
Status: DISCONTINUED | OUTPATIENT
Start: 2019-01-01 | End: 2019-01-01 | Stop reason: HOSPADM

## 2019-01-01 RX ORDER — SODIUM CHLORIDE 0.9 % (FLUSH) 0.9 %
3 SYRINGE (ML) INJECTION EVERY 12 HOURS SCHEDULED
Status: DISCONTINUED | OUTPATIENT
Start: 2019-01-01 | End: 2019-01-01 | Stop reason: HOSPADM

## 2019-01-01 RX ORDER — SODIUM CHLORIDE 9 MG/ML
250 INJECTION, SOLUTION INTRAVENOUS ONCE
Status: CANCELLED | OUTPATIENT
Start: 2019-01-01

## 2019-01-01 RX ORDER — NITROGLYCERIN 20 MG/100ML
INJECTION INTRAVENOUS
Status: DISPENSED
Start: 2019-01-01 | End: 2019-01-01

## 2019-01-01 RX ORDER — CARVEDILOL 3.12 MG/1
TABLET ORAL
COMMUNITY
Start: 2017-10-12 | End: 2019-01-01

## 2019-01-01 RX ORDER — MULTIVIT WITH MINERALS/LUTEIN
250 TABLET ORAL DAILY
COMMUNITY
End: 2020-01-01 | Stop reason: HOSPADM

## 2019-01-01 RX ORDER — ONDANSETRON 2 MG/ML
INJECTION INTRAMUSCULAR; INTRAVENOUS AS NEEDED
Status: DISCONTINUED | OUTPATIENT
Start: 2019-01-01 | End: 2019-01-01 | Stop reason: SURG

## 2019-01-01 RX ORDER — BISACODYL 5 MG/1
5 TABLET, DELAYED RELEASE ORAL DAILY PRN
COMMUNITY
End: 2020-01-01

## 2019-01-01 RX ORDER — ALBUMIN (HUMAN) 12.5 G/50ML
12.5 SOLUTION INTRAVENOUS DAILY PRN
Status: DISCONTINUED | OUTPATIENT
Start: 2019-01-01 | End: 2019-01-01 | Stop reason: HOSPADM

## 2019-01-01 RX ORDER — SODIUM CHLORIDE 9 MG/ML
100 INJECTION, SOLUTION INTRAVENOUS CONTINUOUS
Status: DISCONTINUED | OUTPATIENT
Start: 2019-01-01 | End: 2019-01-01 | Stop reason: HOSPADM

## 2019-01-01 RX ORDER — LACTULOSE 10 G/15ML
20 SOLUTION ORAL; RECTAL 4 TIMES DAILY
Qty: 3600 ML | Refills: 1 | Status: SHIPPED | OUTPATIENT
Start: 2019-01-01 | End: 2020-01-01 | Stop reason: HOSPADM

## 2019-01-01 RX ORDER — FENTANYL CITRATE 50 UG/ML
INJECTION, SOLUTION INTRAMUSCULAR; INTRAVENOUS AS NEEDED
Status: DISCONTINUED | OUTPATIENT
Start: 2019-01-01 | End: 2019-01-01 | Stop reason: SURG

## 2019-01-01 RX ORDER — ONDANSETRON 2 MG/ML
4 INJECTION INTRAMUSCULAR; INTRAVENOUS ONCE AS NEEDED
Status: DISCONTINUED | OUTPATIENT
Start: 2019-01-01 | End: 2019-01-01 | Stop reason: HOSPADM

## 2019-01-01 RX ORDER — ALBUMIN (HUMAN) 12.5 G/50ML
25 SOLUTION INTRAVENOUS DAILY PRN
Status: CANCELLED | OUTPATIENT
Start: 2019-01-01

## 2019-01-01 RX ORDER — FENTANYL CITRATE 50 UG/ML
INJECTION, SOLUTION INTRAMUSCULAR; INTRAVENOUS
Status: COMPLETED | OUTPATIENT
Start: 2019-01-01 | End: 2019-01-01

## 2019-01-01 RX ORDER — ALBUMIN (HUMAN) 12.5 G/50ML
25 SOLUTION INTRAVENOUS DAILY PRN
Status: DISCONTINUED | OUTPATIENT
Start: 2019-01-01 | End: 2019-01-01 | Stop reason: HOSPADM

## 2019-01-01 RX ORDER — PANTOPRAZOLE SODIUM 40 MG/1
TABLET, DELAYED RELEASE ORAL
Refills: 11 | COMMUNITY
Start: 2019-01-01 | End: 2019-01-01 | Stop reason: SINTOL

## 2019-01-01 RX ORDER — HYDROCODONE BITARTRATE AND ACETAMINOPHEN 5; 325 MG/1; MG/1
1 TABLET ORAL EVERY 6 HOURS PRN
COMMUNITY
End: 2019-01-01

## 2019-01-01 RX ORDER — PROPOFOL 10 MG/ML
INJECTION, EMULSION INTRAVENOUS AS NEEDED
Status: DISCONTINUED | OUTPATIENT
Start: 2019-01-01 | End: 2019-01-01 | Stop reason: SURG

## 2019-01-01 RX ORDER — TRIAMCINOLONE ACETONIDE 1 MG/G
CREAM TOPICAL
Refills: 0 | COMMUNITY
Start: 2019-01-01 | End: 2019-01-01

## 2019-01-01 RX ORDER — LABETALOL HYDROCHLORIDE 5 MG/ML
5 INJECTION, SOLUTION INTRAVENOUS
Status: DISCONTINUED | OUTPATIENT
Start: 2019-01-01 | End: 2019-01-01 | Stop reason: HOSPADM

## 2019-01-01 RX ORDER — PROMETHAZINE HYDROCHLORIDE 25 MG/1
25 SUPPOSITORY RECTAL ONCE AS NEEDED
Status: DISCONTINUED | OUTPATIENT
Start: 2019-01-01 | End: 2019-01-01 | Stop reason: HOSPADM

## 2019-01-01 RX ORDER — FLUMAZENIL 0.1 MG/ML
0.1 INJECTION INTRAVENOUS AS NEEDED
Status: DISCONTINUED | OUTPATIENT
Start: 2019-01-01 | End: 2019-01-01 | Stop reason: HOSPADM

## 2019-01-01 RX ORDER — MULTIPLE VITAMINS W/ MINERALS TAB 9MG-400MCG
1 TAB ORAL DAILY
COMMUNITY
End: 2020-01-01 | Stop reason: HOSPADM

## 2019-01-01 RX ORDER — HYDROCODONE BITARTRATE AND ACETAMINOPHEN 5; 325 MG/1; MG/1
1 TABLET ORAL EVERY 4 HOURS PRN
Qty: 6 TABLET | Refills: 0 | Status: SHIPPED | OUTPATIENT
Start: 2019-01-01 | End: 2019-01-01

## 2019-01-01 RX ORDER — LEVOTHYROXINE SODIUM 0.05 MG/1
50 TABLET ORAL DAILY
Qty: 90 TABLET | Refills: 3 | Status: SHIPPED | OUTPATIENT
Start: 2019-01-01 | End: 2020-01-01 | Stop reason: HOSPADM

## 2019-01-01 RX ORDER — LACTULOSE 10 G/15ML
SOLUTION ORAL 4 TIMES DAILY
COMMUNITY
Start: 2019-01-01 | End: 2020-01-01 | Stop reason: HOSPADM

## 2019-01-01 RX ORDER — MIDAZOLAM HYDROCHLORIDE 1 MG/ML
INJECTION INTRAMUSCULAR; INTRAVENOUS
Status: COMPLETED | OUTPATIENT
Start: 2019-01-01 | End: 2019-01-01

## 2019-01-01 RX ORDER — SODIUM CHLORIDE 9 MG/ML
100 INJECTION, SOLUTION INTRAVENOUS CONTINUOUS
Status: CANCELLED | OUTPATIENT
Start: 2019-01-01

## 2019-01-01 RX ORDER — PANTOPRAZOLE SODIUM 40 MG/1
40 TABLET, DELAYED RELEASE ORAL
Qty: 30 | Refills: 11 | Status: COMPLETED
Start: 2019-01-01 | End: 2020-01-01

## 2019-01-01 RX ORDER — NALOXONE HCL 0.4 MG/ML
0.4 VIAL (ML) INJECTION AS NEEDED
Status: DISCONTINUED | OUTPATIENT
Start: 2019-01-01 | End: 2019-01-01 | Stop reason: HOSPADM

## 2019-01-01 RX ORDER — PROMETHAZINE HYDROCHLORIDE 25 MG/ML
6.25 INJECTION, SOLUTION INTRAMUSCULAR; INTRAVENOUS ONCE AS NEEDED
Status: DISCONTINUED | OUTPATIENT
Start: 2019-01-01 | End: 2019-01-01 | Stop reason: HOSPADM

## 2019-01-01 RX ADMIN — IOPAMIDOL 100 ML: 755 INJECTION, SOLUTION INTRAVENOUS at 14:03

## 2019-01-01 RX ADMIN — ALBUMIN HUMAN 75 G: 0.25 SOLUTION INTRAVENOUS at 10:10

## 2019-01-01 RX ADMIN — FENTANYL CITRATE 50 MCG: 50 INJECTION, SOLUTION INTRAMUSCULAR; INTRAVENOUS at 09:42

## 2019-01-01 RX ADMIN — FERUMOXYTOL 510 MG: 510 INJECTION INTRAVENOUS at 11:59

## 2019-01-01 RX ADMIN — FENTANYL CITRATE 50 MCG: 50 INJECTION, SOLUTION INTRAMUSCULAR; INTRAVENOUS at 09:03

## 2019-01-01 RX ADMIN — IOPAMIDOL 50 ML: 755 INJECTION, SOLUTION INTRAVENOUS at 09:50

## 2019-01-01 RX ADMIN — DEXAMETHASONE SODIUM PHOSPHATE 4 MG: 4 INJECTION, SOLUTION INTRAMUSCULAR; INTRAVENOUS at 09:16

## 2019-01-01 RX ADMIN — FENTANYL CITRATE 50 MCG: 50 INJECTION, SOLUTION INTRAMUSCULAR; INTRAVENOUS at 09:18

## 2019-01-01 RX ADMIN — MIDAZOLAM 2 MG: 1 INJECTION INTRAMUSCULAR; INTRAVENOUS at 10:05

## 2019-01-01 RX ADMIN — CEFAZOLIN SODIUM 2 G: 1 INJECTION, POWDER, FOR SOLUTION INTRAMUSCULAR; INTRAVENOUS at 09:10

## 2019-01-01 RX ADMIN — ONDANSETRON 4 MG: 2 INJECTION INTRAMUSCULAR; INTRAVENOUS at 09:36

## 2019-01-01 RX ADMIN — FERUMOXYTOL 510 MG: 510 INJECTION INTRAVENOUS at 13:29

## 2019-01-01 RX ADMIN — ALBUMIN HUMAN 75 G: 0.25 SOLUTION INTRAVENOUS at 15:30

## 2019-01-01 RX ADMIN — PROPOFOL 150 MG: 10 INJECTION, EMULSION INTRAVENOUS at 09:03

## 2019-01-01 RX ADMIN — FENTANYL CITRATE 100 MCG: 50 INJECTION, SOLUTION INTRAMUSCULAR; INTRAVENOUS at 10:05

## 2019-01-01 RX ADMIN — FENTANYL CITRATE 50 MCG: 50 INJECTION, SOLUTION INTRAMUSCULAR; INTRAVENOUS at 09:12

## 2019-01-01 RX ADMIN — FERUMOXYTOL 510 MG: 510 INJECTION INTRAVENOUS at 09:56

## 2019-01-01 RX ADMIN — FERUMOXYTOL 510 MG: 510 INJECTION INTRAVENOUS at 09:59

## 2019-01-01 RX ADMIN — SODIUM CHLORIDE 9 ML/HR: 900 INJECTION, SOLUTION INTRAVENOUS at 08:31

## 2019-02-20 ENCOUNTER — CONVERSION ENCOUNTER (OUTPATIENT)
Dept: FAMILY MEDICINE CLINIC | Facility: CLINIC | Age: 69
End: 2019-02-20

## 2019-03-01 ENCOUNTER — CONVERSION ENCOUNTER (OUTPATIENT)
Dept: FAMILY MEDICINE CLINIC | Facility: CLINIC | Age: 69
End: 2019-03-01

## 2019-03-01 ENCOUNTER — HOSPITAL ENCOUNTER (OUTPATIENT)
Dept: FAMILY MEDICINE CLINIC | Facility: CLINIC | Age: 69
Setting detail: SPECIMEN
Discharge: HOME | End: 2019-03-01
Attending: PHYSICIAN ASSISTANT | Admitting: PHYSICIAN ASSISTANT

## 2019-03-01 LAB
CONV ABS BANDS: 1.2 10*3/UL
CONV ABS IMM GRAN: 0.19 10*3/UL
CONV ANISOCYTES: SLIGHT
CONV POIKILOCYTOSIS IN BLOOD BY LIGHT MICROSCOPY: SLIGHT
CONV POLYCHROMASIA IN BLOOD BY LIGHT MICROSCOPY: SLIGHT
DIFFERENTIAL METHOD BLD: (no result)
ERYTHROCYTE [DISTWIDTH] IN BLOOD BY AUTOMATED COUNT: 15.9 % (ref 11.5–14.5)
HCT VFR BLD AUTO: 41.6 % (ref 35–49)
HGB BLD-MCNC: 13.6 G/DL (ref 12–15)
LYMPHOCYTES # BLD AUTO: 0.8 10*3/UL (ref 0.8–4.8)
LYMPHOCYTES NFR BLD AUTO: 8 % (ref 18–42)
MCH RBC QN AUTO: 29.3 PG (ref 26–32)
MCHC RBC AUTO-ENTMCNC: 32.7 G/DL (ref 32–36)
MCV RBC AUTO: 89.7 FL (ref 80–94)
METAMYELOCYTES NFR BLD: 1 %
MONOCYTES # BLD AUTO: 0.4 10*3/UL (ref 0.1–1.3)
MONOCYTES NFR BLD AUTO: 4 % (ref 2–11)
MYELOCYTES NFR BLD MANUAL: 1 %
NEUTROPHILS # BLD AUTO: 6.9 10*3/UL (ref 2.3–8.6)
NEUTROPHILS NFR BLD AUTO: 73 % (ref 50–75)
NEUTS BAND NFR BLD MANUAL: 13 % (ref 0–5)
PATHOLOGIST REVIEW: (no result)
PATHOLOGIST REVIEW: (no result)
PLATELET # BLD AUTO: 219 10*3/UL (ref 150–450)
PMV BLD AUTO: 7.5 FL (ref 7.4–10.4)
RBC # BLD AUTO: 4.64 10*6/UL (ref 4–5.4)
WBC # BLD AUTO: 9.5 10*3/UL (ref 4.5–11.5)

## 2019-04-01 ENCOUNTER — CLINICAL SUPPORT (OUTPATIENT)
Dept: ONCOLOGY | Facility: HOSPITAL | Age: 69
End: 2019-04-01

## 2019-04-01 ENCOUNTER — HOSPITAL ENCOUNTER (OUTPATIENT)
Dept: ONCOLOGY | Facility: HOSPITAL | Age: 69
Discharge: HOME | End: 2019-04-01
Attending: INTERNAL MEDICINE | Admitting: INTERNAL MEDICINE

## 2019-04-01 ENCOUNTER — HOSPITAL ENCOUNTER (OUTPATIENT)
Dept: ONCOLOGY | Facility: CLINIC | Age: 69
Setting detail: INFUSION SERIES
Discharge: HOME | End: 2019-04-01
Attending: INTERNAL MEDICINE | Admitting: INTERNAL MEDICINE

## 2019-04-01 LAB
ALBUMIN SERPL-MCNC: 3.3 G/DL (ref 3.5–4.8)
ALBUMIN/GLOB SERPL: 1.4 {RATIO} (ref 1–1.7)
ALP SERPL-CCNC: 72 IU/L (ref 32–91)
ALT SERPL-CCNC: 17 IU/L (ref 14–54)
ANION GAP SERPL CALC-SCNC: 17 MMOL/L (ref 10–20)
AST SERPL-CCNC: 22 IU/L (ref 15–41)
BILIRUB SERPL-MCNC: 1.2 MG/DL (ref 0.3–1.2)
BUN SERPL-MCNC: 14 MG/DL (ref 8–20)
BUN/CREAT SERPL: 15.6 (ref 5.4–26.2)
CALCIUM SERPL-MCNC: 8.3 MG/DL (ref 8.9–10.3)
CHLORIDE SERPL-SCNC: 103 MMOL/L (ref 101–111)
CONV CO2: 23 MMOL/L (ref 22–32)
CONV TOTAL PROTEIN: 5.7 G/DL (ref 6.1–7.9)
CREAT UR-MCNC: 0.9 MG/DL (ref 0.4–1)
GLOBULIN UR ELPH-MCNC: 2.4 G/DL (ref 2.5–3.8)
GLUCOSE SERPL-MCNC: 118 MG/DL (ref 65–99)
IRON SATN MFR SERPL: 12 % (ref 15–50)
IRON SERPL-MCNC: 35 UG/DL (ref 28–170)
POTASSIUM SERPL-SCNC: 4 MMOL/L (ref 3.6–5.1)
SODIUM SERPL-SCNC: 139 MMOL/L (ref 136–144)
TIBC SERPL-MCNC: 289 UG/DL (ref 228–428)

## 2019-04-01 NOTE — PROGRESS NOTES
PATIENTS ONCOLOGY RECORD LOCATED IN [facility]      Subjective     Name:  CAMI HILL     Date:  2019  Address:  [address] [place] [address]  Home: [unfilled]  :  1950 AGE:  69 y.o.        RECORDS OBTAINED:  Patients Oncology Record is located in [facility]

## 2019-04-17 ENCOUNTER — CONVERSION ENCOUNTER (OUTPATIENT)
Dept: FAMILY MEDICINE CLINIC | Facility: CLINIC | Age: 69
End: 2019-04-17

## 2019-05-30 PROBLEM — K31.89 OTHER DISEASES OF STOMACH AND DUODENUM: Status: ACTIVE | Noted: 2019-01-01

## 2019-05-30 PROBLEM — D12.5 BENIGN NEOPLASM OF SIGMOID COLON: Status: ACTIVE | Noted: 2019-01-01

## 2019-05-30 PROBLEM — K62.89 RECTAL PAIN: Status: ACTIVE | Noted: 2019-01-01

## 2019-06-19 NOTE — TELEPHONE ENCOUNTER
Received call from patient stating she has bright red blood in stools and coffee ground stools.  She states she is extremely weak.  Attempted to contact patient but her number is no longer in service.  Attempted to contact daughter, Gretchen, who is in HIPPA form, and she too could not be reached and was not able to leave voice mail.  tian Stacka

## 2019-07-01 PROBLEM — E66.9 OBESITY: Status: ACTIVE | Noted: 2019-01-01

## 2019-07-01 PROBLEM — I50.9 CHF (CONGESTIVE HEART FAILURE) (HCC): Status: ACTIVE | Noted: 2019-01-01

## 2019-07-01 PROBLEM — D61.818 PANCYTOPENIA (HCC): Status: ACTIVE | Noted: 2019-01-01

## 2019-07-01 PROBLEM — K74.60 CIRRHOSIS (HCC): Status: ACTIVE | Noted: 2019-01-01

## 2019-07-01 PROBLEM — N18.30 CRF (CHRONIC RENAL FAILURE), STAGE 3 (MODERATE) (HCC): Status: ACTIVE | Noted: 2019-01-01

## 2019-07-01 PROBLEM — K76.6 HYPERTENSION, PORTAL (HCC): Status: ACTIVE | Noted: 2019-01-01

## 2019-07-01 NOTE — PROGRESS NOTES
Hematology/Oncology Outpatient Follow Up    Kathie Cohen  1950    Primary Care Physician: Garry Jon MD  Referring Physician: Garry Jon*    No chief complaint on file.    Patient with non-Hodgkin's lymphoma.  Patient has pancytopenia.  History of Present Illness:   Ms. Cohen has a history of non-Hodgkin’s lymphoma diagnosed in   2011.  At that time she was treated under the care of Dr. Julianna Brito and Dr. Waller with   R+CHOP for six cycles.  Patient has been in remission since then.  She has not been following on   a regular basis with her oncologist.  Patient was evaluated by her primary care physician and   noted that she had an abnormal CBC and patient was sent to [facility] and seen   initially by me on 4/27/16.    · 4/27/16 - Bone marrow aspirate showed normocellular bone marrow.  Cellularity 30% to 40%.    Normal unremarkable megakaryocytes.  Adequate iron stores.  Negative for involvement by lymphoma   or carcinoma.  Flow cytometry (N).  C[employer]ogenetics 46 XY.  · 5/3/16 - Whole body PET/CT scan revealed splenomegaly and cirrhosis with spleen size of 19 cm.    Negative for lymphadenopathy.  Small amount of free fluid in the pelvis.    · 11/9/16 to March 2019 - Patient lost to follow up.    · March 2019 - Patient was admitted to [facility] with CHF exacerbation.  Laboratory work up at that time   revealed pancytopenia.  Patient has chronic pancytopenia.  -5/30/2019 colonoscopy revealed a tubular adenoma which was benign.        Past Medical History:   Diagnosis Date   • CHF (congestive heart failure) (CMS/HCC)    • Cirrhosis (CMS/HCC)    • CRF (chronic renal failure), stage 3 (moderate) (CMS/HCC)    • History of non-Hodgkin's lymphoma 2011    Treated in 2011   • Hypothyroidism    • CURIEL (nonalcoholic steatohepatitis)    • Obesity    • Pancytopenia (CMS/HCC)    • Portal hypertension (CMS/HCC)        Past Surgical History:   Procedure Laterality Date   •  CHOLECYSTECTOMY     • LIVER BIOPSY     • TONSILLECTOMY     • TUBAL ABDOMINAL LIGATION           Current Outpatient Medications:   •  carvedilol (COREG) 3.125 MG tablet, CARVEDILOL 3.125 MG TABS, Disp: , Rfl:   •  levothyroxine (SYNTHROID) 50 MCG tablet, Take 1 tablet by mouth Daily., Disp: 90 tablet, Rfl: 3  •  pantoprazole (PROTONIX) 40 MG EC tablet, TK 1 T PO QAM, Disp: , Rfl: 11    Allergies   Allergen Reactions   • Meperidine Other (See Comments)     Patient saw kids coming through the window with their heads off which scared her to death.         Family History   Problem Relation Age of Onset   • Hypertension Other        Cancer-related family history is not on file.    Social History     Tobacco Use   • Smoking status: Never Smoker   Substance Use Topics   • Alcohol use: Yes     Comment: Occasionally   • Drug use: No       I have reviewed the history of present illness, past medical history, family history, social history, lab results, all notes and other records since the patient was last seen on 6/30/2019    SUBJECTIVE:      Complains of pain on the right chest wall where she has a wound.  She reports that it started in the spring of this year.  She lost weight.  No bleeding    ROS:     Review of Systems   HENT: Negative for nosebleeds and trouble swallowing.    Eyes: Negative for visual disturbance.   Respiratory: Negative for cough, shortness of breath and wheezing.    Cardiovascular: Negative for chest pain.   Gastrointestinal: Negative for abdominal pain and blood in stool.   Genitourinary: Negative for dysuria and hematuria.   Musculoskeletal: Negative for joint swelling.   Skin: Negative for rash.   Neurological: Negative for seizures.   Hematological: Does not bruise/bleed easily.   Psychiatric/Behavioral: The patient is not nervous/anxious.          Objective:       Vitals:    07/01/19 0938   BP: 103/61   Pulse: 98   Resp: 16   Temp: 98.2 °F (36.8 °C)   Weight: 90.6 kg (199 lb 12.8 oz)   Height:  "157.5 cm (62\")   PainSc:   5   PainLoc: Chest       Vital signs reviewed  PHYSICAL EXAM:     Physical Exam   Constitutional: She is oriented to person, place, and time.   HENT:   Head: Normocephalic and atraumatic.   Neck: Trachea normal and normal range of motion.   Cardiovascular: S1 normal and S2 normal.   Pulmonary/Chest: Breath sounds normal.   Abdominal: Soft. Bowel sounds are normal. There is no hepatosplenomegaly.   Neurological: She is alert and oriented to person, place, and time. She has normal strength.   Skin: Skin is warm and dry.   Right chest wall around with her port was an elevated 4 x 3 cm oval mass with elevated rolled edges.  There is erythema   Psychiatric: She has a normal mood and affect. Her speech is normal.        RECENT LABS:     WBC   Date Value Ref Range Status   07/01/2019 3.33 (L) 3.40 - 10.80 10*3/mm3 Final   05/15/2018 2.18 (L) 4.5 - 11.0 10*3/uL Final     RBC   Date Value Ref Range Status   07/01/2019 3.61 (L) 3.77 - 5.28 10*6/mm3 Final   05/15/2018 4.07 4.0 - 5.2 10*6/uL Final     Hemoglobin   Date Value Ref Range Status   07/01/2019 8.7 (L) 12.0 - 15.9 g/dL Final   05/15/2018 12.3 12.0 - 16.0 g/dL Final     Hematocrit   Date Value Ref Range Status   07/01/2019 30.7 (L) 34.0 - 46.6 % Final   05/15/2018 37.7 36.0 - 46.0 % Final     MCV   Date Value Ref Range Status   07/01/2019 85.0 79.0 - 97.0 fL Final   05/15/2018 92.6 80.0 - 100.0 fL Final     MCH   Date Value Ref Range Status   07/01/2019 24.1 (L) 26.6 - 33.0 pg Final   05/15/2018 30.2 26.0 - 34.0 pg Final     MCHC   Date Value Ref Range Status   07/01/2019 28.3 (L) 31.5 - 35.7 g/dL Final   05/15/2018 32.6 31.0 - 37.0 g/dL Final     RDW   Date Value Ref Range Status   07/01/2019 17.1 (H) 12.3 - 15.4 % Final   05/15/2018 13.8 12.0 - 16.8 % Final     RDW-SD   Date Value Ref Range Status   07/01/2019 52.2 37.0 - 54.0 fl Final     MPV   Date Value Ref Range Status   07/01/2019 8.9 6.0 - 12.0 fL Final   05/15/2018 9.1 7.8 - 11.0 fL " Final     Platelets   Date Value Ref Range Status   07/01/2019 137 (L) 140 - 450 10*3/mm3 Final   05/15/2018 69 (L) 140 - 440 10*3/uL Final     Neutrophil Rel %   Date Value Ref Range Status   05/15/2018 62.9 45 - 80 % Final     Neutrophil %   Date Value Ref Range Status   07/01/2019 61.0 42.7 - 76.0 % Final     Lymphocyte Rel %   Date Value Ref Range Status   05/15/2018 25.2 15 - 50 % Final     Lymphocyte %   Date Value Ref Range Status   07/01/2019 26.7 19.6 - 45.3 % Final     Monocyte Rel %   Date Value Ref Range Status   05/15/2018 9.6 0 - 15 % Final     Monocyte %   Date Value Ref Range Status   07/01/2019 10.5 5.0 - 12.0 % Final     Eosinophil %   Date Value Ref Range Status   07/01/2019 1.5 0.3 - 6.2 % Final   05/15/2018 1.8 0 - 7 % Final     Basophil Rel %   Date Value Ref Range Status   05/15/2018 0.5 0 - 2 % Final     Basophil %   Date Value Ref Range Status   07/01/2019 0.3 0.0 - 1.5 % Final     Neutrophils Absolute   Date Value Ref Range Status   05/15/2018 1.37 (L) 2.0 - 8.8 10*3/uL Final     Neutrophils, Absolute   Date Value Ref Range Status   07/01/2019 2.03 1.70 - 7.00 10*3/mm3 Final     Lymphocytes Absolute   Date Value Ref Range Status   05/15/2018 0.55 (L) 1.2 - 3.4 10*3/uL Final     Lymphocytes, Absolute   Date Value Ref Range Status   07/01/2019 0.89 0.70 - 3.10 10*3/mm3 Final     Monocytes Absolute   Date Value Ref Range Status   05/15/2018 0.21 0.0 - 1.7 10*3/uL Final     Monocytes, Absolute   Date Value Ref Range Status   07/01/2019 0.35 0.10 - 0.90 10*3/mm3 Final     Eosinophils Absolute   Date Value Ref Range Status   05/15/2018 0.04 0.0 - 0.8 10*3/uL Final     Eosinophils, Absolute   Date Value Ref Range Status   07/01/2019 0.05 0.00 - 0.40 10*3/mm3 Final     Basophils Absolute   Date Value Ref Range Status   05/15/2018 0.01 0.0 - 0.2 10*3/uL Final     Basophils, Absolute   Date Value Ref Range Status   07/01/2019 0.01 0.00 - 0.20 10*3/mm3 Final     nRBC   Date Value Ref Range Status    05/17/2019 0 0 /100[WBCs] Final       Lab Results   Component Value Date    GLUCOSE 110 (H) 05/17/2019    BUN 18 05/17/2019    CREATININE 1.4 (H) 05/17/2019    BCR 12.9 05/17/2019    K 4.1 05/17/2019    CO2 25 05/17/2019    CALCIUM 8.3 (L) 05/17/2019    ALBUMIN 3.0 (L) 05/17/2019    LABIL2 1.2 05/17/2019    AST 22 05/17/2019    ALT 12 (L) 05/17/2019         Assessment/Plan      ASSESSMENT:     1. Pancytopenia  2. History of non-Hodgkin's lymphoma status post chemotherapy in 2011  3. Cirrhosis from an CURIEL  4. Portal hypertension  5. Right chest skin mass  6. CRF 3  7. CHF  8. Obesity   9. ECOG 1    PLAN:      1. Reviewed CBC results.  Patient received chemotherapy in the past for her non-Hodgkin's lymphoma.  Her chemo drugs could cause potentially myelodysplastic syndrome I will obtain bone marrow biopsy.  I will obtain anemia work-up.  Patient was encouraged to take multivitamin tablets.   2. Treatment for non-Hodgkin's lymphoma was in 2011 I will obtain restaging CT scans given the weight loss  3. Cirrhosis is stable.  4. Portal hypertension is related to her cirrhosis causing pancytopenia no evidence of bleeding  5. I will set up appointment with Dr. Frederick Ryan with general surgery regarding a mass on her right chest  6. Renal function cardiac function is stable  7. She lost weight has abdominal distention related to splenomegaly  8. I will see her back in my office after the bone marrow biopsy and Dr. Ryan's evaluation and prior CT scans .  He has a history of non-Hodgkin's lymphoma now with weight loss skin mass and pancytopenia, evaluate for recurrence we will obtain CT scan of the soft tissue of the neck chest abdomen and pelvis      I have reviewed labs results, imaging, vitals, and medications with the patient today.     During the visit the wound on her right chest was dressed in the Band-Aid    Patient verbalized understanding and is in agreement of the above plan.          This report was compiled  using Dragon voice recognition software. I have made every effort to proof read this document; however, typographical errors may persist.

## 2019-07-02 NOTE — ADDENDUM NOTE
Addended by: MATEO POSADA on: 7/2/2019 11:40 AM     Modules accepted: Level of Service, SmartSet

## 2019-07-10 PROBLEM — K22.8 OTHER SPECIFIED DISEASES OF ESOPHAGUS: Status: ACTIVE | Noted: 2019-01-01

## 2019-07-11 PROBLEM — K21.9 GASTROESOPHAGEAL REFLUX DISEASE: Status: ACTIVE | Noted: 2019-04-17

## 2019-07-11 PROBLEM — L98.9 SKIN DISORDER: Status: ACTIVE | Noted: 2019-04-17

## 2019-07-11 PROBLEM — E03.9 HYPOTHYROIDISM: Status: ACTIVE | Noted: 2019-01-01

## 2019-07-12 NOTE — PROGRESS NOTES
"Subjective   Kathie Cohen is a 69 y.o. female.   Chief Complaint   Patient presents with   • Skin Lesion     Right chest     /68 (BP Location: Left arm, Patient Position: Sitting, Cuff Size: Adult)   Pulse 96   Temp 99.8 °F (37.7 °C) (Oral)   Ht 157.5 cm (62\")   Wt 87.3 kg (192 lb 6.4 oz)   SpO2 98%   BMI 35.19 kg/m²     HISTORY OF PRESENT ILLNESS:  69-year-old lady was referred to me for evaluation of a skin lesion of her right chest.  She has a history of lymphoma is briefly undergone chemotherapy.  After her port was removed several years ago in the medial aspect of the incision she started to develop a wound.  Over the last couple of years this wound has gotten larger and more brown.  She says occasionally it gets crusty and inflamed some time to complete.  The color has been gradually changing.  He does cause itching and discomfort.  She is referred for surgical management was likely a cancer of the right chest.      Outpatient Encounter Medications as of 7/11/2019   Medication Sig Dispense Refill   • bisacodyl (DULCOLAX) 5 MG EC tablet Take 5 mg by mouth Daily As Needed for Constipation.     • carvedilol (COREG) 3.125 MG tablet CARVEDILOL 3.125 MG TABS     • levothyroxine (SYNTHROID) 50 MCG tablet Take 1 tablet by mouth Daily. 90 tablet 3   • Multiple Vitamins-Minerals (MULTIVITAMIN WITH MINERALS) tablet tablet Take 1 tablet by mouth Daily.     • pantoprazole (PROTONIX) 40 MG EC tablet TK 1 T PO QAM  11     No facility-administered encounter medications on file as of 7/11/2019.          The following portions of the patient's history were reviewed and updated as appropriate: allergies, current medications, past family history, past medical history, past social history, past surgical history and problem list.    Review of Systems  Complete review systems obtained is positive for lack of bladder control bowel changes constipation ankle swelling earache dry mouth loss of hearing and sinus trouble.  " Remainder review systems negative    Objective   Physical Exam   Constitutional: She appears well-developed and well-nourished.   HENT:   Head: Normocephalic and atraumatic.   Eyes: Conjunctivae and EOM are normal.   Neck: Neck supple. No tracheal deviation present.   Cardiovascular: Normal rate and regular rhythm.   Pulmonary/Chest: Effort normal. No stridor. No respiratory distress.   Abdominal: Soft. She exhibits no distension.   On the right chest medial aspect of her portal incision there is a 20 x 17 mm ulcerated skin lesion with scabbing.  There is no skin surrounding erythema or induration.   Musculoskeletal: She exhibits edema. She exhibits no deformity.   Neurological: She is alert. No cranial nerve deficit.   Skin: Skin is warm and dry.   Psychiatric: She has a normal mood and affect. Her behavior is normal.         Assessment/Plan   Kathie was seen today for skin lesion.    Diagnoses and all orders for this visit:    Skin disorder  -     Case Request; Standing  -     sodium chloride 0.9 % infusion  -     ceFAZolin (ANCEF) 2 g in sodium chloride 0.9 % 100 mL IVPB  -     Case Request    Other orders  -     Follow Anesthesia Guidelines / Standing Orders; Future  -     Provide NPO Instructions to Patient; Future  -     Follow Anesthesia Guidelines / Standing Orders; Standing  -     Verify NPO Status; Standing  -     Obtain Informed Consent; Standing  -     Clip Operative Site; Standing  -     Have Patient Void Prior to Procedure; Standing  -     Instructions on Coughing, Deep Breathing & Incentive Spirometry; Standing  -     Oxygen Therapy- Nasal Cannula; Titrate for SPO2: 90% - 95%; Standing  -     Notify Physician - Standard; Standing    Likely skin cancer of the right chest 20 x 17 mm.  I counseled her about the risk benefits of proceeding for surgical excision.  We talked about wound with accommodation including bleeding infection scar.  I talked her about cannulated palpation including past margin any  formal surgery.  After this discussion she understands wished to proceed.  She does prefer to have it performed under monitored anesthesia which I think would be reasonable.    Frederick Ryan MD  7/12/2019  9:42 AM

## 2019-07-16 NOTE — DISCHARGE INSTRUCTIONS
A responsible adult should stay with you and you should rest quietly for the rest of the day. Do not drink alcohol, drive or cook for 24 hours following your procedure.  Progress your diet as tolerated.  Resume your usually medications including aspirin.  When you remove your dressing in 48 hours, a small amount of blood is to be expected. Do not be alarmed.  If you feel it is bleeding excessively apply pressure and proceed to the Emergency room.  Do not shower, bath, or get your dressing wet at all for 48 hours.  You may shower after the dressing is removed. No lifting more that 10 pounds for 48 hours.  If severe pain, increased shortness of air or racing heartbeat occur, seek immediate medical attention.  Follow up with Dr. Schmidt for results.

## 2019-07-23 NOTE — PROGRESS NOTES
"Subjective   Kathie Cohen is a 69 y.o. female.   Chief Complaint   Patient presents with   • Follow-up     Posterior neck cyst     /66 (BP Location: Right arm, Patient Position: Sitting, Cuff Size: Adult)   Pulse 94   Temp 98.1 °F (36.7 °C) (Oral)   Ht 157.5 cm (62\")   Wt 87.4 kg (192 lb 9.6 oz)   SpO2 97%   BMI 35.23 kg/m²     HISTORY OF PRESENT ILLNESS:  69-year-old lady who I am scheduled to surgery for tomorrow for a check skin lesion is possibly a skin cancer.  She says that she last couple days has had some swelling and drainage from the posterior neck skin lesion.  She says there is a small cyst in that location but never caused any trouble but gradually swollen not red and began to drain.  She seen by PAT recommended me take a look at this lesion prior to surgery.  Today she says that it has gradually improved as far as the irritation inflammation pain and drainage      Outpatient Encounter Medications as of 7/23/2019   Medication Sig Dispense Refill   • bisacodyl (DULCOLAX) 5 MG EC tablet Take 5 mg by mouth Daily As Needed for Constipation.     • carvedilol (COREG) 3.125 MG tablet CARVEDILOL 3.125 MG TABS TWICE A DAY     • levothyroxine (SYNTHROID) 50 MCG tablet Take 1 tablet by mouth Daily. (Patient taking differently: Take 50 mcg by mouth Daily. NOT GENERIC) 90 tablet 3   • Multiple Vitamins-Minerals (MULTIVITAMIN WITH MINERALS) tablet tablet Take 1 tablet by mouth Daily.     • pantoprazole (PROTONIX) 40 MG EC tablet TK 1 T PO QAM  11   • Sennosides (EX-LAX) 15 MG chewable tablet Chew 15 mg As Needed.       No facility-administered encounter medications on file as of 7/23/2019.          The following portions of the patient's history were reviewed and updated as appropriate: allergies, current medications, past family history, past medical history, past social history, past surgical history and problem list.      Objective   Physical Exam   Constitutional: She appears well-developed and " well-nourished.   HENT:   Head: Normocephalic and atraumatic.   Eyes: Conjunctivae and EOM are normal.   Neurological: She is alert. No cranial nerve deficit.   Skin: Skin is warm and dry.   Right chest skin lesion no changes in previous visit.  Posterior neck skin lesion proximal 1 cm in greatest dimension with some minimal hyperemia no significant induration or erythema.         Assessment/Plan   Kathie was seen today for follow-up.    Diagnoses and all orders for this visit:    Skin lesion of neck    Chest skin lesion    Plan excision of a right chest skin lesion likely skin cancer tomorrow in the operating room.  I counseled her about the risk benefits of the additional procedure and excision of a 1 cm posterior neck recently for a sebaceous cyst.  After discussion she does wish to proceed with removal of both the lesion at the same time under anesthesia.    Frederick Ryan MD  7/23/2019  10:12 AM

## 2019-07-24 NOTE — ANESTHESIA PREPROCEDURE EVALUATION
Anesthesia Evaluation     Patient summary reviewed   no history of anesthetic complications:  NPO Solid Status: > 8 hours  NPO Liquid Status: > 8 hours           Airway   Mallampati: I  TM distance: >3 FB  Neck ROM: limited  No difficulty expected  Dental    (+) poor dentition        Pulmonary - normal exam   (+) COPD mild,   Cardiovascular - normal exam    ECG reviewed    (+) hypertension well controlled, CHF,       Neuro/Psych  GI/Hepatic/Renal/Endo    (+) obesity,  GERD well controlled,  hypothyroidism,     Musculoskeletal     Abdominal  - normal exam   Substance History - negative use     OB/GYN          Other   (+) arthritis   history of cancer remission                    Anesthesia Plan    ASA 3     general     intravenous induction   Anesthetic plan, all risks, benefits, and alternatives have been provided, discussed and informed consent has been obtained with: patient.    Plan discussed with [facility]AA.

## 2019-07-24 NOTE — ANESTHESIA POSTPROCEDURE EVALUATION
Patient: Kathie Cohen    Procedure Summary     Date:  07/24/19 Room / Location:  [facility] OR 09 / [facility] MAIN OR    Anesthesia Start:  0900 Anesthesia Stop:  0957    Procedure:  Excision right chest skin lesion 4.5cm excised diameter, Excision posterior neck skin lesion 1.5 cm excised diameter (N/A Chest) Diagnosis:       Skin disorder      (Skin disorder [L98.9])    Surgeon:  Frederick Ryan MD Provider:  Enrico White MD    Anesthesia Type:  general ASA Status:  3          Anesthesia Type: general  Last vitals  BP   121/69 (07/24/19 1100)   Temp   97.3 °F (36.3 °C) (07/24/19 1100)   Pulse   72 (07/24/19 1100)   Resp   18 (07/24/19 1100)     SpO2   100 % (07/24/19 1100)     Post Anesthesia Care and Evaluation    Patient location during evaluation: PACU  Patient participation: complete - patient participated  Level of consciousness: awake  Pain score: 1 (See nurse's notes for pain score)  Pain management: adequate  Airway patency: patent  Anesthetic complications: No anesthetic complications  PONV Status: none  Cardiovascular status: acceptable  Respiratory status: acceptable  Hydration status: acceptable    Comments: Patient seen and examined postoperatively; vital signs stable; SpO2 greater than or equal to 90%; cardiopulmonary status stable; nausea/vomiting adequately controlled; pain adequately controlled; no apparent anesthesia complications; patient discharged from anesthesia care when discharge criteria were met

## 2019-08-05 PROBLEM — D50.9 MICROCYTIC ANEMIA: Status: ACTIVE | Noted: 2019-01-01

## 2019-08-05 PROBLEM — K21.9 GASTROESOPHAGEAL REFLUX DISEASE WITHOUT ESOPHAGITIS: Status: ACTIVE | Noted: 2019-01-01

## 2019-08-05 PROBLEM — D50.0 IRON DEFICIENCY ANEMIA DUE TO CHRONIC BLOOD LOSS: Status: ACTIVE | Noted: 2019-01-01

## 2019-08-05 NOTE — PROGRESS NOTES
Hematology/Oncology Outpatient Follow Up    Kathie Cohen  1950    Primary Care Physician: Garry Jon MD  Referring Physician: Garry Jon*  Chief complaint:  Patient with non-Hodgkin's lymphoma.  Patient has pancytopenia.  Iron deficiency anemia  History of Present Illness:   Ms. Cohen has a history of non-Hodgkin’s lymphoma diagnosed in   2011.  At that time she was treated under the care of Dr. Julianna Brito and Dr. Waller with   R+CHOP for six cycles.  Patient has been in remission since then.  She has not been following on   a regular basis with her oncologist.  Patient was evaluated by her primary care physician and   noted that she had an abnormal CBC and patient was sent to [facility] and seen   initially by me on 4/27/16.    · 4/27/16 - Bone marrow aspirate showed normocellular bone marrow.  Cellularity 30% to 40%.    Normal unremarkable megakaryocytes.  Adequate iron stores.  Negative for involvement by lymphoma   or carcinoma.  Flow cytometry (N).  Cytogenetics 46 XY.  · 5/3/16 - Whole body PET/CT scan revealed splenomegaly and cirrhosis with spleen size of 19 cm.    Negative for lymphadenopathy.  Small amount of free fluid in the pelvis.    · 11/9/16 to March 2019 - Patient lost to follow up.    · March 2019 - Patient was admitted to [facility] with CHF exacerbation.  Laboratory work up at that time   revealed pancytopenia.  Patient has chronic pancytopenia.  -5/30/2019 colonoscopy revealed a tubular adenoma which was benign.    -7/16/2019 bone marrow biopsy for pancytopenia  Final Diagnosis   Smears of bone marrow aspirate with cell block (clot) preparation and marrow biopsy:    Hypocellular bone marrow (15-20%)    Decreased iron stores    Negative for involvement by malignant lymphoma   Flow cytometry normal cytogenetics normal 40 666  2.  Squamous cell carcinoma on the skin on right chest  Specimen #1 (Lesion, right chest, excision):    Ulcerated  moderately differentiated  squamous cell carcinoma    Lesion is completely excised     Specimen #2 (Lesion, posterior neck, excision):    Acute and chronic dermatitis with acanthosis, parakeratosis and scale crust    PAS and GMS stains are negative for infectious organisms (valid controls)    No malignancy identified    3.  Iron deficiency anemia/microcytic anemia  4.  Curiel related cirrhosis  Past Medical History:   Diagnosis Date   • Arthritis    • Cancer (CMS/HCC) 2011    non hodgens lymphoma   • CHF (congestive heart failure) (CMS/HCC)     UNCERTAIN   • Cirrhosis (CMS/HCC)    • Constipation    • COPD (chronic obstructive pulmonary disease) (CMS/HCC)    • CRF (chronic renal failure), stage 3 (moderate) (CMS/HCC)    • GERD (gastroesophageal reflux disease)     RARELY   • History of colon polyps    • History of non-Hodgkin's lymphoma 2011    Treated in 2011   • Hypertension    • Hypothyroidism    • CURIEL (nonalcoholic steatohepatitis)    • Non-healing skin lesion     RIGHT CHEST   • Obesity    • Pancytopenia (CMS/HCC)    • Peripheral edema    • Portal hypertension (CMS/HCC)    • Seasonal allergies    • Skin lesions     COMES AND GOES CURRENT ON BACK OF NECK   • Ureteral obstruction     SECONDARY TO CHEMO       Past Surgical History:   Procedure Laterality Date   • CATARACT EXTRACTION, BILATERAL     • CHOLECYSTECTOMY     • COLONOSCOPY     • CYSTOSCOPY W/ URETERAL STENT PLACEMENT      CHANGE STENT  EVERY 3 MONTHS   • LIVER BIOPSY     • MEDIPORT INSERTION, SINGLE     • MEDIPORT REMOVAL     • SKIN LESION EXCISION N/A 7/24/2019    Procedure: Excision right chest skin lesion 4.5cm excised diameter, Excision posterior neck skin lesion 1.5 cm excised diameter;  Surgeon: Frederick Ryan MD;  Location: [facility] OR;  Service: General   • TONSILLECTOMY     • TUBAL ABDOMINAL LIGATION     • URETERAL EXPLORATION      ATTEMPT TO REPAIR OBST SECONDARY TO CHEMO         Current Outpatient Medications:   •  bisacodyl (DULCOLAX) 5 MG  EC tablet, Take 5 mg by mouth Daily As Needed for Constipation., Disp: , Rfl:   •  carvedilol (COREG) 3.125 MG tablet, CARVEDILOL 3.125 MG TABS TWICE A DAY, Disp: , Rfl:   •  levothyroxine (SYNTHROID) 50 MCG tablet, Take 1 tablet by mouth Daily. (Patient taking differently: Take 50 mcg by mouth Daily. NOT GENERIC), Disp: 90 tablet, Rfl: 3  •  Multiple Vitamins-Minerals (MULTIVITAMIN WITH MINERALS) tablet tablet, Take 1 tablet by mouth Daily., Disp: , Rfl:   •  pantoprazole (PROTONIX) 40 MG EC tablet, TK 1 T PO QAM, Disp: , Rfl: 11  •  Sennosides (EX-LAX) 15 MG chewable tablet, Chew 15 mg As Needed., Disp: , Rfl:   •  HYDROcodone-acetaminophen (NORCO) 5-325 MG per tablet, Take 1 tablet by mouth Every 4 (Four) Hours As Needed for Moderate Pain ., Disp: 6 tablet, Rfl: 0    Allergies   Allergen Reactions   • Meperidine Other (See Comments)     Patient saw kids coming through the window with their heads off which scared her to death.         Family History   Problem Relation Age of Onset   • Hypertension Other        Cancer-related family history is not on file.    Social History     Tobacco Use   • Smoking status: Never Smoker   • Smokeless tobacco: Never Used   Substance Use Topics   • Alcohol use: No     Frequency: Never     Comment: Occasionally   • Drug use: No       I have reviewed the history of present illness, past medical history, family history, social history, lab results, all notes and other records since the patient was last seen on 6/30/2019    SUBJECTIVE:      Patient is my office for follow-up of her history of lymphoma pancytopenia and no new diagnosis of squamous cell carcinoma the skin.  Tolerated the procedures well.  She had excision of the mass on the right side chest wall and back of the neck.  Tolerated the bone marrow biopsy.  Has fatigue tiredness palpitations.  He denies any visible blood loss has constipation  ROS:     Review of Systems   Constitutional: Negative for fever.   HENT: Negative  "for nosebleeds and trouble swallowing.    Eyes: Negative for visual disturbance.   Respiratory: Negative for cough, shortness of breath and wheezing.    Cardiovascular: Negative for chest pain.   Gastrointestinal: Negative for abdominal pain and blood in stool.   Endocrine: Negative for cold intolerance.   Genitourinary: Negative for dysuria and hematuria.   Musculoskeletal: Negative for joint swelling.   Skin: Negative for rash.   Allergic/Immunologic: Negative for environmental allergies.   Neurological: Negative for seizures.   Hematological: Does not bruise/bleed easily.   Psychiatric/Behavioral: The patient is not nervous/anxious.          Objective:       Vitals:    08/05/19 0930   BP: 130/53   Pulse: 91   Resp: 18   Temp: 98.1 °F (36.7 °C)   Weight: 87.6 kg (193 lb 3.2 oz)   Height: 157.5 cm (62\")   PainSc: 0-No pain       Vital signs reviewed  PHYSICAL EXAM:     Physical Exam   Constitutional: She is oriented to person, place, and time. No distress.   HENT:   Head: Normocephalic and atraumatic.   Eyes: Conjunctivae and EOM are normal. Right eye exhibits no discharge. Left eye exhibits no discharge. No scleral icterus.   Neck: Trachea normal and normal range of motion. Neck supple. No thyromegaly present.   Cardiovascular: Normal rate, regular rhythm, S1 normal, S2 normal and normal heart sounds. Exam reveals no gallop and no friction rub.   Tachycardia   Pulmonary/Chest: Effort normal and breath sounds normal. No stridor. No respiratory distress. She has no wheezes.   Abdominal: Soft. Bowel sounds are normal. She exhibits no mass. There is no hepatosplenomegaly. There is no tenderness. There is no rebound and no guarding.   Musculoskeletal: Normal range of motion. She exhibits no tenderness.   Lymphadenopathy:     She has no cervical adenopathy.   Neurological: She is alert and oriented to person, place, and time. She has normal strength. She exhibits normal muscle tone.   Skin: Skin is warm and dry. No rash " noted. She is not diaphoretic. No erythema.   Mass excision site on the right chest wall healing well  Left posterior neck incision does not show any evidence of infection but trying to heal by third intention   Psychiatric: She has a normal mood and affect. Her speech is normal and behavior is normal.   Nursing note and vitals reviewed.       RECENT LABS:     WBC   Date Value Ref Range Status   07/22/2019 4.60 3.40 - 10.80 10*3/mm3 Final   05/15/2018 2.18 (L) 4.5 - 11.0 10*3/uL Final     RBC   Date Value Ref Range Status   07/22/2019 3.38 (L) 3.77 - 5.28 10*6/mm3 Final   05/15/2018 4.07 4.0 - 5.2 10*6/uL Final     Hemoglobin   Date Value Ref Range Status   07/22/2019 8.2 (L) 12.0 - 15.9 g/dL Final   05/15/2018 12.3 12.0 - 16.0 g/dL Final     Hematocrit   Date Value Ref Range Status   07/22/2019 26.8 (L) 34.0 - 46.6 % Final   05/15/2018 37.7 36.0 - 46.0 % Final     MCV   Date Value Ref Range Status   07/22/2019 79.4 79.0 - 97.0 fL Final   05/15/2018 92.6 80.0 - 100.0 fL Final     MCH   Date Value Ref Range Status   07/22/2019 24.3 (L) 26.6 - 33.0 pg Final   05/15/2018 30.2 26.0 - 34.0 pg Final     MCHC   Date Value Ref Range Status   07/22/2019 30.6 (L) 31.5 - 35.7 g/dL Final   05/15/2018 32.6 31.0 - 37.0 g/dL Final     RDW   Date Value Ref Range Status   07/22/2019 18.5 (H) 12.3 - 15.4 % Final   05/15/2018 13.8 12.0 - 16.8 % Final     RDW-SD   Date Value Ref Range Status   07/22/2019 52.5 37.0 - 54.0 fl Final     MPV   Date Value Ref Range Status   07/22/2019 7.7 6.0 - 12.0 fL Final   05/15/2018 9.1 7.8 - 11.0 fL Final     Platelets   Date Value Ref Range Status   07/22/2019 156 140 - 450 10*3/mm3 Final   05/15/2018 69 (L) 140 - 440 10*3/uL Final     Neutrophil Rel %   Date Value Ref Range Status   05/15/2018 62.9 45 - 80 % Final     Neutrophil %   Date Value Ref Range Status   07/16/2019 62.2 42.7 - 76.0 % Final     Lymphocyte Rel %   Date Value Ref Range Status   05/15/2018 25.2 15 - 50 % Final     Lymphocyte %    Date Value Ref Range Status   07/16/2019 26.5 19.6 - 45.3 % Final     Monocyte Rel %   Date Value Ref Range Status   05/15/2018 9.6 0 - 15 % Final     Monocyte %   Date Value Ref Range Status   07/16/2019 9.3 5.0 - 12.0 % Final     Eosinophil %   Date Value Ref Range Status   07/16/2019 1.7 0.3 - 6.2 % Final   05/15/2018 1.8 0 - 7 % Final     Basophil Rel %   Date Value Ref Range Status   05/15/2018 0.5 0 - 2 % Final     Basophil %   Date Value Ref Range Status   07/16/2019 0.3 0.0 - 1.5 % Final     Neutrophils Absolute   Date Value Ref Range Status   07/22/2019 3.22 1.70 - 7.00 10*3/mm3 Final   05/15/2018 1.37 (L) 2.0 - 8.8 10*3/uL Final     Neutrophils, Absolute   Date Value Ref Range Status   07/16/2019 1.40 (L) 1.70 - 7.00 10*3/mm3 Final     Lymphocytes Absolute   Date Value Ref Range Status   05/15/2018 0.55 (L) 1.2 - 3.4 10*3/uL Final     Lymphocytes, Absolute   Date Value Ref Range Status   07/16/2019 0.60 (L) 0.70 - 3.10 10*3/mm3 Final     Monocytes Absolute   Date Value Ref Range Status   05/15/2018 0.21 0.0 - 1.7 10*3/uL Final     Monocytes, Absolute   Date Value Ref Range Status   07/16/2019 0.20 0.10 - 0.90 10*3/mm3 Final     Eosinophils Absolute   Date Value Ref Range Status   07/22/2019 0.09 0.00 - 0.40 10*3/mm3 Final   05/15/2018 0.04 0.0 - 0.8 10*3/uL Final     Eosinophils, Absolute   Date Value Ref Range Status   07/16/2019 0.00 0.00 - 0.40 10*3/mm3 Final     Basophils Absolute   Date Value Ref Range Status   07/22/2019 0.05 0.00 - 0.20 10*3/mm3 Final   05/15/2018 0.01 0.0 - 0.2 10*3/uL Final     Basophils, Absolute   Date Value Ref Range Status   07/16/2019 0.00 0.00 - 0.20 10*3/mm3 Final     nRBC   Date Value Ref Range Status   07/16/2019 0.1 0.0 - 0.2 /100 WBC Final       Lab Results   Component Value Date    GLUCOSE 133 (H) 07/22/2019    BUN 21 (H) 07/22/2019    CREATININE 1.40 (H) 07/22/2019    EGFRIFNONA 37 (L) 07/22/2019    BCR 15.0 07/22/2019    K 4.4 07/22/2019    CO2 22.0 07/22/2019     CALCIUM 8.4 (L) 07/22/2019    ALBUMIN 2.90 (L) 07/22/2019    LABIL2 1.2 05/17/2019    AST 19 07/22/2019    ALT 13 (L) 07/22/2019         Assessment/Plan      ASSESSMENT:     1. Pancytopenia  2. Iron deficiency anemia  3. History of non-Hodgkin's lymphoma status post chemotherapy in 2011  4. Cirrhosis from an CURIEL  5. Portal hypertension  6. Right chest wall squamous cell carcinoma of the skin  7. CRF 3  8. CHF  9. Obesity   10. ECOG 1    PLAN:      1. Reviewed CBC results.  CBC shows normal WBC count and platelet count today.  Hemoglobin is low at 8.2.  I discussed the bone marrow results with the patient which shows decreased cellularity but no evidence of myelodysplastic syndrome.  2. Treatment for non-Hodgkin's lymphoma was in 2011 I will obtain restaging CT scans given the weight loss.  Will obtain CT scan of the soft tissue neck chest abdomen pelvis for follow-up of her lymphoma and also abnormal findings seen on CT scans in March 2019.  3. Cirrhosis is stable.  I will set up appointment with I.  4. Portal hypertension is related to her cirrhosis causing pancytopenia no evidence of bleeding  5. Microcytic anemia/iron deficiency anemia patient had a colonoscopy did not show bleeding lesions.  Patient will need an EGD evaluation.  I will set up appointment with I.  I will start her on Feraheme infusions for iron deficiency anemia.  6. Check ammonia level  7. Renal function cardiac function is stable  8. She lost weight has abdominal distention related to splenomegaly  9. I will see her back in my office in 6 weeks time before that she I will have received Feraheme infusion      I have reviewed labs results, pathology, imaging, vitals, and medications with the patient today.       Patient verbalized understanding and is in agreement of the above plan.          This report was compiled using Dragon voice recognition software. I have made every effort to proof read this document; however, typographical errors  may persist.

## 2019-08-07 PROBLEM — T45.4X5A ADVERSE EFFECT OF IRON: Status: ACTIVE | Noted: 2019-01-01

## 2019-08-07 NOTE — PROGRESS NOTES
Discussed diagnosis for Iron infusion.  Per Dr. Schmidt, diagnosis is iron deficiency and oral iron intolerance.

## 2019-08-08 NOTE — PROGRESS NOTES
"Subjective   Kathie Cohen is a 69 y.o. female.   Patient is status post excision of posterior neck and right chest skin lesions.  The right chest turned out to be a moderately different is been cell carcinoma that was completed excised.  The posterior neck was acute on chronic dermatitis and ability to identify.  She says that she is wearing her name tag around her neck has been rubbing along her posterior neck incision.  She denies any pain back there but the wound did open up and that is healing by secondary intention.  She says that her chest incision feels that it is pulling some but is otherwise not giving her any trouble.    Objective   /60 (BP Location: Right arm, Patient Position: Sitting, Cuff Size: Adult)   Pulse 91   Temp 98.3 °F (36.8 °C) (Oral)   Ht 157.5 cm (62\")   Wt 87.5 kg (193 lb)   SpO2 98%   BMI 35.30 kg/m²   Physical Exam   Constitutional: She appears well-developed and well-nourished.   HENT:   Head: Normocephalic and atraumatic.   Skin:   The posterior neck wound has a granulation base and some mild thickness exudate no evidence of infection.  The right chest incision is healing well with some minor hyperemia at the sutures.  No evidence of infection.     Pathology reviewed ulcerated moderately differentiated skin cell carcinoma excised.    Assessment/Plan   Kathie was seen today for post-op.    Diagnoses and all orders for this visit:    Skin lesion of neck    Chest skin lesion    Cover her posterior neck wound with gauze or Band-Aid as it is healing.  Mainly the right chest skin wound open.  Follow-up as needed    Frederick Ryan MD  8/8/2019  4:20 PM    "

## 2019-08-14 NOTE — TELEPHONE ENCOUNTER
The patient has not shown for her 11:00 appointment for [facility]. Called and spoke with the patient. She stated she was told 1200 for her appointment but she is on her way. The RN taking care of the patient was notified.

## 2019-08-20 NOTE — PROGRESS NOTES
"Subjective   Kathie Cohen is a 69 y.o. female.   Chief Complaint   Patient presents with   • Cyst     posterior neck     /74 (BP Location: Right arm, Patient Position: Sitting, Cuff Size: Adult)   Pulse 105   Temp 99 °F (37.2 °C) (Oral)   Ht 157.5 cm (62\")   Wt 88 kg (194 lb)   SpO2 97%   BMI 35.48 kg/m²     HISTORY OF PRESENT ILLNESS:  69-year-old lady who I recently removed a skin cancer off of her chest and excision of a benign inflammatory dermatitis lesion of her posterior neck who read presents today with a similar issue approximately 1 inch superior to her neck excision.  She says that about 1 week ago the pain flared up it became warm and tender in order to treat this she decided to place zee grease on the wound.  She says that the zee grease did help alleviate some of her pain and radhames out some of the pus.  Since that time she has been placing warm compresses and intermittently using some bacitracin.  She said when this flared up it caused some tenderness to palpation and pain with neck rotation.  The symptoms have largely resolved.      Outpatient Encounter Medications as of 8/20/2019   Medication Sig Dispense Refill   • bisacodyl (DULCOLAX) 5 MG EC tablet Take 5 mg by mouth Daily As Needed for Constipation.     • carvedilol (COREG) 3.125 MG tablet CARVEDILOL 3.125 MG TABS TWICE A DAY     • levothyroxine (SYNTHROID) 50 MCG tablet Take 1 tablet by mouth Daily. (Patient taking differently: Take 50 mcg by mouth Daily. NOT GENERIC) 90 tablet 3   • Multiple Vitamins-Minerals (MULTIVITAMIN WITH MINERALS) tablet tablet Take 1 tablet by mouth Daily.     • pantoprazole (PROTONIX) 40 MG EC tablet TK 1 T PO QAM  11   • Sennosides (EX-LAX) 15 MG chewable tablet Chew 15 mg As Needed.       No facility-administered encounter medications on file as of 8/20/2019.          The following portions of the patient's history were reviewed and updated as appropriate: allergies, current medications, past family " history, past medical history, past social history, past surgical history and problem list.    Objective   Physical Exam   Constitutional: She appears well-developed and well-nourished.   HENT:   Head: Normocephalic and atraumatic.   Neck:   In the posterior neck there is an approximately 2 x 1 cm open healing area from the previous excision in approximately 3 cm above this there is a similar area of mild induration without significant surrounding erythema and no fluctuance.   Pulmonary/Chest: Effort normal. No respiratory distress.   Skin: Skin is warm and dry.         Assessment/Plan   Kathie was seen today for cyst.    Diagnoses and all orders for this visit:    Dermatitis    I have counseled her that this lesion just above the previously excised lesion likely represents the same pathology as it does have a similar appearance.  For now I just recommend ongoing topical management including washing with soap and water and applying a dry gauze.  I do not see any active infection here and I do not think that this area needs to be excised.  I have counseled her that she probably should not continue to apply zee grease to wounds.    Frederick Ryan MD  8/20/2019  1:47 PM

## 2019-08-21 NOTE — TELEPHONE ENCOUNTER
Patient states she has appt today at 11:00 but is currently at [facility] scheduled to get labs/ECHO for procedure tomorrow.    She states she may be a few minutes late.  Ivy Lo RN notified and todd/chasity Bledsoe

## 2019-08-21 NOTE — PROGRESS NOTES
Patients hgb-7.9. Labs were drawn for Dr. Balderas. I called Dr. Gomez office to make them aware of reading, I was transferred to Danielle Meyer with no answer voicemail was left regarding information and for her to return call to verbalize understanding of level.      1320 Received a return call from Danielle at Dr. Gomez office she states that they will recheck patients hgb tomorrow morning prior to surgery.

## 2019-09-11 NOTE — PROGRESS NOTES
Hematology/Oncology Outpatient Follow Up    Kathie Cohen  1950    Primary Care Physician: Garry Jon MD  Referring Physician: Garry Jon*  Chief complaint:  Patient with non-Hodgkin's lymphoma.  Patient has pancytopenia.  Iron deficiency anemia  History of Present Illness:   Ms. Cohen has a history of non-Hodgkin’s lymphoma diagnosed in   2011.  At that time she was treated under the care of Dr. Julianna Brito and Dr. Waller with   R+CHOP for six cycles.  Patient has been in remission since then.  She has not been following on   a regular basis with her oncologist.  Patient was evaluated by her primary care physician and   noted that she had an abnormal CBC and patient was sent to [facility] and seen   initially by me on 4/27/16.    · 4/27/16 - Bone marrow aspirate showed normocellular bone marrow.  Cellularity 30% to 40%.    Normal unremarkable megakaryocytes.  Adequate iron stores.  Negative for involvement by lymphoma   or carcinoma.  Flow cytometry (N).  Cytogenetics 46 XY.  · 5/3/16 - Whole body PET/CT scan revealed splenomegaly and cirrhosis with spleen size of 19 cm.    Negative for lymphadenopathy.  Small amount of free fluid in the pelvis.    · 11/9/16 to March 2019 - Patient lost to follow up.    · March 2019 - Patient was admitted to [facility] with CHF exacerbation.  Laboratory work up at that time   revealed pancytopenia.  Patient has chronic pancytopenia.  -7/1/2019 CT scan of the chest abdomen pelvis and soft tissue of neck with contrast  1. Interval clearing of the right upper lobe pulmonary infiltrates since  the last study.  2. Stable subcarinal node measuring approximately 11 mm in short axis  diameter.  3. New subpleural nodular density in the superior segment of the left  lower lobe measuring 11 mm in diameter.  4. CT findings suggesting cirrhosis with splenomegaly and portal  hypertension.  5. Interval development of ascites.  6. Midline  umbilical hernia containing fluid.  7. Interim insertion of a left-sided double-J ureteral stent. The left  distal ureter deviates laterally and there are inflammatory changes in  the retroperitoneum. There is a new small 15 mm fluid collection  adjacent to the left ureter in this area of inflammation. This may  represent an area of kyle disease related to the patient's lymphoma.  This has progressed since the last scan.    -5/30/2019 colonoscopy revealed a tubular adenoma which was benign.    -7/16/2019 bone marrow biopsy for pancytopenia  Final Diagnosis   Smears of bone marrow aspirate with cell block (clot) preparation and marrow biopsy:    Hypocellular bone marrow (15-20%)    Decreased iron stores    Negative for involvement by malignant lymphoma   Flow cytometry normal cytogenetics normal 40 666  2.  Squamous cell carcinoma on the skin on right chest  Specimen #1 (Lesion, right chest, excision):    Ulcerated moderately differentiated  squamous cell carcinoma    Lesion is completely excised     Specimen #2 (Lesion, posterior neck, excision):    Acute and chronic dermatitis with acanthosis, parakeratosis and scale crust    PAS and GMS stains are negative for infectious organisms (valid controls)    No malignancy identified    3.  Iron deficiency anemia/microcytic anemia, chronic GI blood loss, received Feraheme infusions in August 2019  4.  Curiel related cirrhosis  Past Medical History:   Diagnosis Date   • Arthritis    • Cancer (CMS/HCC) 2011    non hodgens lymphoma   • CHF (congestive heart failure) (CMS/HCC)     UNCERTAIN   • Cirrhosis (CMS/HCC)    • Constipation    • COPD (chronic obstructive pulmonary disease) (CMS/HCC)    • CRF (chronic renal failure), stage 3 (moderate) (CMS/HCC)    • GERD (gastroesophageal reflux disease)     RARELY   • History of colon polyps    • History of non-Hodgkin's lymphoma 2011    Treated in 2011   • Hypertension    • Hypothyroidism    • CURIEL (nonalcoholic steatohepatitis)    •  Non-healing skin lesion     RIGHT CHEST   • Obesity    • Pancytopenia (CMS/HCC)    • Peripheral edema    • Portal hypertension (CMS/HCC)    • Seasonal allergies    • Skin lesions     COMES AND GOES CURRENT ON BACK OF NECK   • Ureteral obstruction     SECONDARY TO CHEMO       Past Surgical History:   Procedure Laterality Date   • CATARACT EXTRACTION, BILATERAL     • CHOLECYSTECTOMY     • COLONOSCOPY     • CYSTOSCOPY W/ URETERAL STENT PLACEMENT      CHANGE STENT  EVERY 3 MONTHS   • LIVER BIOPSY     • MEDIPORT INSERTION, SINGLE     • MEDIPORT REMOVAL     • SKIN LESION EXCISION N/A 7/24/2019    Procedure: Excision right chest skin lesion 4.5cm excised diameter, Excision posterior neck skin lesion 1.5 cm excised diameter;  Surgeon: Frederick Ryan MD;  Location: [facility] MAIN OR;  Service: General   • TONSILLECTOMY     • TUBAL ABDOMINAL LIGATION     • URETERAL EXPLORATION      ATTEMPT TO REPAIR OBST SECONDARY TO CHEMO         Current Outpatient Medications:   •  bisacodyl (DULCOLAX) 5 MG EC tablet, Take 5 mg by mouth Daily As Needed for Constipation., Disp: , Rfl:   •  carvedilol (COREG) 3.125 MG tablet, CARVEDILOL 3.125 MG TABS TWICE A DAY, Disp: , Rfl:   •  cephalexin (KEFLEX) 500 MG capsule, TAKE 1 CAPSULE BY MOUTH THREE TIMES A DAY FOR 7 DAYS, Disp: , Rfl: 0  •  levothyroxine (SYNTHROID) 50 MCG tablet, Take 1 tablet by mouth Daily. (Patient taking differently: Take 50 mcg by mouth Daily. NOT GENERIC), Disp: 90 tablet, Rfl: 3  •  Multiple Vitamins-Minerals (MULTIVITAMIN WITH MINERALS) tablet tablet, Take 1 tablet by mouth Daily., Disp: , Rfl:   •  Sennosides (EX-LAX) 15 MG chewable tablet, Chew 15 mg As Needed., Disp: , Rfl:   •  triamcinolone (KENALOG) 0.1 % cream, APPLY TOPICALLY 3 (THREE) TIMES DAILY SPARINGLY TO AFFECTED AREA UNTIL BETTER THEN AS NEEDED.., Disp: , Rfl: 0    Allergies   Allergen Reactions   • Demerol [Meperidine] Other (See Comments)     Patient saw kids coming through the window with their heads  "off which scared her to death.         Family History   Problem Relation Age of Onset   • Hypertension Other        Cancer-related family history is not on file.    Social History     Tobacco Use   • Smoking status: Never Smoker   • Smokeless tobacco: Never Used   Substance Use Topics   • Alcohol use: Yes     Frequency: Monthly or less     Drinks per session: 1 or 2     Comment: Occasionally   • Drug use: No       I have reviewed the history of present illness, past medical history, family history, social history, lab results, all notes and other records since the patient was last seen on 6/30/2019    SUBJECTIVE:      Patient is my office for follow-up of her history of chronic iron deficiency anemia and pancytopenia.  She has healed well post squamous cell carcinoma surgery on the chest.  Tolerated the iron infusions well.  Denies visible blood loss.  Worried about lymphoma recurrence    ROS:     Review of Systems   Constitutional: Negative for fever.   HENT: Negative for nosebleeds and trouble swallowing.    Eyes: Negative for visual disturbance.   Respiratory: Negative for cough, shortness of breath and wheezing.    Cardiovascular: Negative for chest pain.   Gastrointestinal: Negative for abdominal pain and blood in stool.   Endocrine: Negative for cold intolerance.   Genitourinary: Negative for dysuria and hematuria.   Musculoskeletal: Negative for joint swelling.   Skin: Negative for rash.   Allergic/Immunologic: Negative for environmental allergies.   Neurological: Negative for seizures.   Hematological: Does not bruise/bleed easily.   Psychiatric/Behavioral: The patient is not nervous/anxious.          Objective:       Vitals:    09/11/19 0929   BP: 130/75   Pulse: 90   Resp: 18   Temp: 98.8 °F (37.1 °C)   Weight: 87.3 kg (192 lb 6.4 oz)   Height: 157.5 cm (62\")   PainSc: 0-No pain       Vital signs reviewed  PHYSICAL EXAM:     Physical Exam   Constitutional: She is oriented to person, place, and time. No " distress.   HENT:   Head: Normocephalic and atraumatic.   Eyes: Conjunctivae and EOM are normal. Right eye exhibits no discharge. Left eye exhibits no discharge. No scleral icterus.   Neck: Trachea normal and normal range of motion. Neck supple. No thyromegaly present.   Cardiovascular: Normal rate, regular rhythm, S1 normal, S2 normal and normal heart sounds. Exam reveals no gallop and no friction rub.   Tachycardia   Pulmonary/Chest: Effort normal and breath sounds normal. No stridor. No respiratory distress. She has no wheezes.   Abdominal: Soft. Bowel sounds are normal. She exhibits no mass. There is no hepatosplenomegaly. There is no tenderness. There is no rebound and no guarding.   Distended likely ascites palpable spleen   Musculoskeletal: Normal range of motion. She exhibits no tenderness.   Lymphadenopathy:     She has no cervical adenopathy.   Neurological: She is alert and oriented to person, place, and time. She has normal strength. She exhibits normal muscle tone.   Skin: Skin is warm and dry. No rash noted. She is not diaphoretic. No erythema.   Mass excision site on the right chest wall healing well  Left posterior neck incision does not show any evidence of infection but trying to heal by third intention   Psychiatric: She has a normal mood and affect. Her speech is normal and behavior is normal.   Nursing note and vitals reviewed.       RECENT LABS:     WBC   Date Value Ref Range Status   09/11/2019 2.47 (L) 3.40 - 10.80 10*3/mm3 Final   05/15/2018 2.18 (L) 4.5 - 11.0 10*3/uL Final     RBC   Date Value Ref Range Status   09/11/2019 3.20 (L) 3.77 - 5.28 10*6/mm3 Final   05/15/2018 4.07 4.0 - 5.2 10*6/uL Final     Hemoglobin   Date Value Ref Range Status   09/11/2019 8.4 (L) 12.0 - 15.9 g/dL Final   05/15/2018 12.3 12.0 - 16.0 g/dL Final     Hematocrit   Date Value Ref Range Status   09/11/2019 28.8 (L) 34.0 - 46.6 % Final   05/15/2018 37.7 36.0 - 46.0 % Final     MCV   Date Value Ref Range Status    09/11/2019 90.0 79.0 - 97.0 fL Final   05/15/2018 92.6 80.0 - 100.0 fL Final     MCH   Date Value Ref Range Status   09/11/2019 26.3 (L) 26.6 - 33.0 pg Final   05/15/2018 30.2 26.0 - 34.0 pg Final     MCHC   Date Value Ref Range Status   09/11/2019 29.2 (L) 31.5 - 35.7 g/dL Final   05/15/2018 32.6 31.0 - 37.0 g/dL Final     RDW   Date Value Ref Range Status   09/11/2019 19.8 (H) 12.3 - 15.4 % Final   05/15/2018 13.8 12.0 - 16.8 % Final     RDW-SD   Date Value Ref Range Status   09/11/2019 63.3 (H) 37.0 - 54.0 fl Final     MPV   Date Value Ref Range Status   09/11/2019 7.9 6.0 - 12.0 fL Final   05/15/2018 9.1 7.8 - 11.0 fL Final     Platelets   Date Value Ref Range Status   09/11/2019 89 (L) 140 - 450 10*3/mm3 Final   05/15/2018 69 (L) 140 - 440 10*3/uL Final     Neutrophil Rel %   Date Value Ref Range Status   05/15/2018 62.9 45 - 80 % Final     Neutrophil %   Date Value Ref Range Status   09/11/2019 50.7 42.7 - 76.0 % Final     Lymphocyte Rel %   Date Value Ref Range Status   05/15/2018 25.2 15 - 50 % Final     Lymphocyte %   Date Value Ref Range Status   09/11/2019 35.6 19.6 - 45.3 % Final     Monocyte Rel %   Date Value Ref Range Status   05/15/2018 9.6 0 - 15 % Final     Monocyte %   Date Value Ref Range Status   09/11/2019 11.7 5.0 - 12.0 % Final     Eosinophil %   Date Value Ref Range Status   09/11/2019 1.6 0.3 - 6.2 % Final   05/15/2018 1.8 0 - 7 % Final     Basophil Rel %   Date Value Ref Range Status   05/15/2018 0.5 0 - 2 % Final     Basophil %   Date Value Ref Range Status   09/11/2019 0.4 0.0 - 1.5 % Final     Neutrophils Absolute   Date Value Ref Range Status   05/15/2018 1.37 (L) 2.0 - 8.8 10*3/uL Final     Neutrophils, Absolute   Date Value Ref Range Status   09/11/2019 1.25 (L) 1.70 - 7.00 10*3/mm3 Final     Lymphocytes Absolute   Date Value Ref Range Status   05/15/2018 0.55 (L) 1.2 - 3.4 10*3/uL Final     Lymphocytes, Absolute   Date Value Ref Range Status   09/11/2019 0.88 0.70 - 3.10 10*3/mm3  Final     Monocytes Absolute   Date Value Ref Range Status   05/15/2018 0.21 0.0 - 1.7 10*3/uL Final     Monocytes, Absolute   Date Value Ref Range Status   09/11/2019 0.29 0.10 - 0.90 10*3/mm3 Final     Eosinophils Absolute   Date Value Ref Range Status   05/15/2018 0.04 0.0 - 0.8 10*3/uL Final     Eosinophils, Absolute   Date Value Ref Range Status   09/11/2019 0.04 0.00 - 0.40 10*3/mm3 Final     Basophils Absolute   Date Value Ref Range Status   05/15/2018 0.01 0.0 - 0.2 10*3/uL Final     Basophils, Absolute   Date Value Ref Range Status   09/11/2019 0.01 0.00 - 0.20 10*3/mm3 Final     nRBC   Date Value Ref Range Status   07/16/2019 0.1 0.0 - 0.2 /100 WBC Final       Lab Results   Component Value Date    GLUCOSE 97 08/21/2019    BUN 15 08/21/2019    CREATININE 1.20 (H) 08/21/2019    EGFRIFNONA 45 (L) 08/21/2019    BCR 12.5 08/21/2019    K 4.2 08/21/2019    CO2 24.0 08/21/2019    CALCIUM 8.5 (L) 08/21/2019    ALBUMIN 2.90 (L) 07/22/2019    LABIL2 1.2 05/17/2019    AST 19 07/22/2019    ALT 13 (L) 07/22/2019         Assessment/Plan      ASSESSMENT:     1. Pancytopenia  2. Iron deficiency anemia  3. History of non-Hodgkin's lymphoma status post chemotherapy in 2011  4. Cirrhosis from an CURIEL  5. Portal hypertension  6. Right chest wall squamous cell carcinoma of the skin  7. ECOG 1    PLAN:      1. Reviewed CBC results.  CBC shows pancytopenia.  Pancytopenia is related to portal hypertension.  She received iron infusion hemoglobin is 8.4 MCV has normalized I will check iron studies today.  Recheck CBC in 4 weeks.  No growth factors at this time.    2. Treatment for non-Hodgkin's lymphoma was in 2011 I will obtain restaging CT scans given the weight loss.  Restaging CT scans in July 2019- for evidence of recurrence of lymphoma.  15 mm of fluid collection/mass in the pelvis will repeat a scan in 4 weeks which will be 3 months from the prior scan  3. Serum creatinine has normalized  4. Cirrhosis is stable.  I will set  up appointment with I.  5. Portal hypertension is related to her cirrhosis causing pancytopenia no evidence of bleeding  6. Microcytic anemia/iron deficiency anemia patient had a colonoscopy did not show bleeding lesions.  Patient will need an EGD evaluation.  Patient to follow-up with [facility].  She was put on Protonix and not compliant patient was instructed to contact [facility].  7. Ammonia level was normal.  8. Renal function and cardiac function is stable  9. Has abdominal distention related to splenomegaly  10. I will see her back in my office in 4 weeks time      I have reviewed labs results, pathology, imaging, vitals, and medications with the patient today.       Patient verbalized understanding and is in agreement of the above plan.          This report was compiled using Dragon voice recognition software. I have made every effort to proof read this document; however, typographical errors may persist.

## 2019-09-12 NOTE — TELEPHONE ENCOUNTER
Attempted to call patient again to inform her of iron results and order for iron infusion. No answer and no VM. Phone gives busy signal after about 10 rings. A call was also attempted earlier by another MA with same result.

## 2019-09-20 NOTE — PROGRESS NOTES
"Subjective   Kathie Cohen is a 69 y.o. female.     She is in with a couple concerns today.  She has some swelling in her legs following a trip to [place] recently.  She has been prone to edema in the past but this aggravated to the point that she would like some treatment.  She has been wearing some compression stockings and it is kept the edema from getting any worse.  She has not had any dyspnea or chest pain.  She also has an upcoming appointment with a dermatologist following removal of the skin cancer from her chest wall this summer, and she has a place on her right ear cartilage that has been bothering her.  She does get it this morning and wanted me to look at the area.  She also has an area that was excised from the back of her neck during that same surgery this summer and she would like that evaluated.  She is also due for her screening mammogram.           /70 (BP Location: Left arm, Patient Position: Sitting, Cuff Size: Adult)   Pulse 89   Ht 160 cm (62.99\")   Wt 92.2 kg (203 lb 3.2 oz)   LMP 09/11/2000   SpO2 96%   BMI 36.00 kg/m²       Chief Complaint   Patient presents with   • Leg Swelling   • Foot Swelling           Current Outpatient Medications:   •  bisacodyl (DULCOLAX) 5 MG EC tablet, Take 5 mg by mouth Daily As Needed for Constipation., Disp: , Rfl:   •  carvedilol (COREG) 3.125 MG tablet, CARVEDILOL 3.125 MG TABS TWICE A DAY, Disp: , Rfl:   •  levothyroxine (SYNTHROID) 50 MCG tablet, Take 1 tablet by mouth Daily. (Patient taking differently: Take 50 mcg by mouth Daily. NOT GENERIC), Disp: 90 tablet, Rfl: 3  •  Multiple Vitamins-Minerals (MULTIVITAMIN WITH MINERALS) tablet tablet, Take 1 tablet by mouth Daily., Disp: , Rfl:   •  Sennosides (EX-LAX) 15 MG chewable tablet, Chew 15 mg As Needed., Disp: , Rfl:   •  triamcinolone (KENALOG) 0.1 % cream, APPLY TOPICALLY 3 (THREE) TIMES DAILY SPARINGLY TO AFFECTED AREA UNTIL BETTER THEN AS NEEDED.., Disp: , Rfl: 0  •  cephalexin " (KEFLEX) 500 MG capsule, TAKE 1 CAPSULE BY MOUTH THREE TIMES A DAY FOR 7 DAYS, Disp: , Rfl: 0  •  furosemide (LASIX) 20 MG tablet, Take 1 tablet by mouth 2 (Two) Times a Day., Disp: 60 tablet, Rfl: 1        The following portions of the patient's history were reviewed and updated as appropriate: allergies, current medications, past family history, past medical history, past social history, past surgical history and problem list.    Review of Systems   Constitutional: Negative for activity change, fatigue and fever.   HENT: Negative for congestion, sinus pressure, sinus pain, sore throat and trouble swallowing.    Eyes: Negative for visual disturbance.   Respiratory: Negative for chest tightness, shortness of breath and wheezing.    Cardiovascular: Positive for leg swelling. Negative for chest pain.   Gastrointestinal: Negative for abdominal distention, abdominal pain, constipation, diarrhea, nausea and vomiting.   Genitourinary: Negative for difficulty urinating and dysuria.   Musculoskeletal: Negative for back pain and neck pain.   Skin: Positive for rash and wound.   Neurological: Negative for dizziness, weakness, light-headedness and numbness.   Psychiatric/Behavioral: Negative for agitation, hallucinations, sleep disturbance and suicidal ideas.       Objective   Physical Exam   Constitutional: She is oriented to person, place, and time. No distress.   Cardiovascular: Normal rate and regular rhythm.   Pulmonary/Chest: Effort normal and breath sounds normal. She has no wheezes. She has no rales.   Abdominal: Soft. Bowel sounds are normal. She exhibits no mass. There is no guarding.   Musculoskeletal: She exhibits edema.   Edema is 1+ in both legs to mid shin   Neurological: She is alert and oriented to person, place, and time. She displays normal reflexes.   Skin:   Healing wound on the back of her neck from a bout of dermatitis and skin biopsy  Abnormal area and right ear cartilage that is concerning to me, and  is likely precancerous or cancerous   Psychiatric: She has a normal mood and affect.   Nursing note and vitals reviewed.        Assessment/Plan   Problems Addressed this Visit        Musculoskeletal and Integument    Skin disorder      Other Visit Diagnoses     Localized edema    -  Primary    Screening mammogram, encounter for        Relevant Orders    Mammo Screening Digital Tomosynthesis Bilateral With CAD        I will have her keep the Derm appointment for the skin issues  I will treat the edema with some Lasix that she can use for the next few days and then as needed  She is to call if that is not effective  I have ordered her mammogram and have asked her to call for new concerns

## 2019-09-30 NOTE — ADDENDUM NOTE
Encounter addended by: Courtney Huang RN on: 9/30/2019 12:58 PM   Actions taken: Charge Capture section accepted

## 2019-09-30 NOTE — ADDENDUM NOTE
Encounter addended by: Courtney Huang RN on: 9/30/2019 1:08 PM   Actions taken: Charge Capture section accepted

## 2019-10-08 NOTE — TELEPHONE ENCOUNTER
Pt called. Is wondering if she should stop coreg because her bp without is 100/60 and lower if she takes it.

## 2019-10-10 NOTE — TELEPHONE ENCOUNTER
Tried to return call from patient to reschedule her 10/16 appt w/Dr. Schmidt. Pt wants appt the week of 10/21 but Dr. Schmidt is out of the office. Pt to be offered an appt on 10/30.

## 2019-11-06 NOTE — PROGRESS NOTES
Hematology/Oncology Outpatient Follow Up    Kathie Cohen  1950    Primary Care Physician: Garry Jon MD  Referring Physician: Garry Jon*  Chief complaint:  Non-Hodgkin's lymphoma in 2011  Pancytopenia   Iron deficiency anemia  History of Present Illness:   · Ms. Cohen has a history of non-Hodgkin’s lymphoma diagnosed in  2011.  At that time she was treated under the care of Dr. Julianna Brito and Dr. Waller with [facility] for six cycles.  Patient has been in remission since then.  She has not been following on a regular basis with her oncologist.    · Patient was evaluated by her primary care physician and  noted that she had an abnormal CBC and patient was sent to the [facility] and seen  initially by me on 4/27/16.    · 4/27/16 - Bone marrow aspirate showed normocellular bone marrow.  Cellularity 30% to 40%.  Normal unremarkable megakaryocytes.  Adequate iron stores.  Negative for involvement by lymphoma or carcinoma.  Flow cytometry (N).  Cytogenetics 46 XY.  · 5/3/16 - Whole body PET/CT scan revealed splenomegaly and cirrhosis with spleen size of 19 cm.  Negative for lymphadenopathy.  Small amount of free fluid in the pelvis.    · 11/9/16 to March 2019 - Patient lost to follow up.    · March 2019 - Patient was admitted to [facility] with CHF exacerbation.  Laboratory work up at that time revealed pancytopenia.  Patient has chronic pancytopenia.  · 7/1/2019 CT scan of the chest abdomen pelvis and soft tissue of neck with contrast-1. Interval clearing of the right upper lobe pulmonary infiltrates since the last study.2. Stable subcarinal node measuring approximately 11 mm in short axis diameter.3. New subpleural nodular density in the superior segment of the leftlower lobe measuring 11 mm in diameter.4. CT findings suggesting cirrhosis with splenomegaly and portal hypertension.5. Interval development of ascites.6. Midline umbilical hernia containing fluid.7. Interim  insertion of a left-sided double-J ureteral stent. The leftdistal ureter deviates laterally and there are inflammatory changes in the retroperitoneum. There is a new small 15 mm fluid collectionadjacent to the left ureter in this area of inflammation. This may represent an area of kyle disease related to the patient's lymphoma.This has progressed since the last scan.  -5/30/2019 colonoscopy revealed a tubular adenoma which was benign.  -7/16/2019 bone marrow biopsy for pancytopenia  Final Diagnosis   Smears of bone marrow aspirate with cell block (clot) preparation and marrow biopsy:    Hypocellular bone marrow (15-20%)    Decreased iron stores    Negative for involvement by malignant lymphoma   Flow cytometry normal cytogenetics normal 46 XX    2.  Squamous cell carcinoma on the skin on right chest  Specimen #1 (Lesion, right chest, excision):    Ulcerated moderately differentiated  squamous cell carcinoma    Lesion is completely excised   Specimen #2 (Lesion, posterior neck, excision):    Acute and chronic dermatitis with acanthosis, parakeratosis and scale crust    PAS and GMS stains are negative for infectious organisms (valid controls)    No malignancy identified    3.  Iron deficiency anemia/microcytic anemia, chronic GI blood loss, received Feraheme infusions in August 2019    4.  Curiel related cirrhosis    Past Medical History:   Diagnosis Date   • Arthritis    • Cancer (CMS/HCC) 2011    non hodgens lymphoma   • CHF (congestive heart failure) (CMS/HCC)     UNCERTAIN   • Cirrhosis (CMS/HCC)    • Constipation    • COPD (chronic obstructive pulmonary disease) (CMS/HCC)    • CRF (chronic renal failure), stage 3 (moderate) (CMS/HCC)    • GERD (gastroesophageal reflux disease)     RARELY   • History of colon polyps    • History of non-Hodgkin's lymphoma 2011    Treated in 2011   • Hypertension    • Hypothyroidism    • CURIEL (nonalcoholic steatohepatitis)    • Non-healing skin lesion     RIGHT CHEST   • Obesity    •  Pancytopenia (CMS/HCC)    • Peripheral edema    • Portal hypertension (CMS/HCC)    • Seasonal allergies    • Skin lesions     COMES AND GOES CURRENT ON BACK OF NECK   • Ureteral obstruction     SECONDARY TO CHEMO       Past Surgical History:   Procedure Laterality Date   • CATARACT EXTRACTION, BILATERAL     • CHOLECYSTECTOMY     • COLONOSCOPY     • CYSTOSCOPY W/ URETERAL STENT PLACEMENT      CHANGE STENT  EVERY 3 MONTHS   • LIVER BIOPSY     • MEDIPORT INSERTION, SINGLE     • MEDIPORT REMOVAL     • SKIN LESION EXCISION N/A 7/24/2019    Procedure: Excision right chest skin lesion 4.5cm excised diameter, Excision posterior neck skin lesion 1.5 cm excised diameter;  Surgeon: Frederick Ryan MD;  Location: [facility] MAIN OR;  Service: General   • TONSILLECTOMY     • TUBAL ABDOMINAL LIGATION     • URETERAL EXPLORATION      ATTEMPT TO REPAIR OBST SECONDARY TO CHEMO         Current Outpatient Medications:   •  bisacodyl (DULCOLAX) 5 MG EC tablet, Take 5 mg by mouth Daily As Needed for Constipation., Disp: , Rfl:   •  furosemide (LASIX) 20 MG tablet, Take 1 tablet by mouth 2 (Two) Times a Day., Disp: 60 tablet, Rfl: 1  •  levothyroxine (SYNTHROID) 50 MCG tablet, Take 1 tablet by mouth Daily. (Patient taking differently: Take 50 mcg by mouth Daily. NOT GENERIC), Disp: 90 tablet, Rfl: 3  •  Multiple Vitamins-Minerals (MULTIVITAMIN WITH MINERALS) tablet tablet, Take 1 tablet by mouth Daily., Disp: , Rfl:   •  Sennosides (EX-LAX) 15 MG chewable tablet, Chew 15 mg As Needed., Disp: , Rfl:   •  Unable to find, 1 each 1 (One) Time. Patient states she is taking an OTC suppository stool softener from [employer]., Disp: , Rfl:   •  lactulose (CHRONULAC) 10 GM/15ML solution solution (encephalopathy), Take 30 mL by mouth 4 (Four) Times a Day for 60 days., Disp: 3600 mL, Rfl: 1    Allergies   Allergen Reactions   • Demerol [Meperidine] Other (See Comments)     Patient saw kids coming through the window with their heads off which scared her to  "death.         Family History   Problem Relation Age of Onset   • Hypertension Other        Cancer-related family history is not on file.    Social History     Tobacco Use   • Smoking status: Never Smoker   • Smokeless tobacco: Never Used   Substance Use Topics   • Alcohol use: Yes     Frequency: Monthly or less     Drinks per session: 1 or 2     Comment: Occasionally   • Drug use: No       I have reviewed the history of present illness, past medical history, family history, social history, lab results, all notes and other records since the patient was last seen on 9/11/2019    SUBJECTIVE:      Patient is my office for follow-up of her history of chronic iron deficiency anemia and pancytopenia.  He is gained 22 pounds in the last 1 week.  Unable to walk well.  Reports she itchy on the back and also pain in the right ear.  She reports that rosebush thorn hit her in the right ear.  Denies fevers night sweats.  Reports to feeling sleepy.  Reports to constipation.  She thinks abdominal distention could be related to constipation.  ROS:     Review of Systems   Constitutional: Negative for fever.   HENT: Negative for nosebleeds and trouble swallowing.    Eyes: Negative for visual disturbance.   Respiratory: Negative for cough, shortness of breath and wheezing.    Cardiovascular: Negative for chest pain.   Gastrointestinal: Negative for abdominal pain and blood in stool.   Endocrine: Negative for cold intolerance.   Genitourinary: Negative for dysuria and hematuria.   Musculoskeletal: Negative for joint swelling.   Skin: Negative for rash.   Allergic/Immunologic: Negative for environmental allergies.   Neurological: Negative for seizures.   Hematological: Does not bruise/bleed easily.   Psychiatric/Behavioral: The patient is not nervous/anxious.          Objective:       Vitals:    11/06/19 1231   BP: 111/67   Pulse: 90   Resp: 16   Temp: 97.7 °F (36.5 °C)   Weight: 94.4 kg (208 lb 3.2 oz)   Height: 157.5 cm (62\") "   PainSc:   2   PainLoc: Shoulder  Comment: Right shoulder\       Vital signs reviewed  PHYSICAL EXAM:     Physical Exam   Constitutional: She is oriented to person, place, and time. No distress.   HENT:   Head: Normocephalic and atraumatic.   Eyes: Conjunctivae and EOM are normal. Right eye exhibits no discharge. Left eye exhibits no discharge. No scleral icterus.   Neck: Trachea normal and normal range of motion. Neck supple. No thyromegaly present.   Cardiovascular: Normal rate, regular rhythm, S1 normal, S2 normal and normal heart sounds. Exam reveals no gallop and no friction rub.   Tachycardia   Pulmonary/Chest: Effort normal and breath sounds normal. No stridor. No respiratory distress. She has no wheezes.   Abdominal: Soft. Bowel sounds are normal. She exhibits no mass. There is no hepatosplenomegaly. There is no tenderness. There is no rebound and no guarding.   Distended soft not tender positive ascites.   Musculoskeletal: Normal range of motion. She exhibits no tenderness.   3+ bilateral lower extremity edema   Lymphadenopathy:     She has no cervical adenopathy.   Neurological: She is alert and oriented to person, place, and time. She has normal strength. She exhibits normal muscle tone.   Skin: Skin is warm and dry. No rash noted. She is not diaphoretic. No erythema.   Mass excision site on the right chest wall scar.  1.5 x 1.5 cm red scaly lesion on the posterior right upper chest   Psychiatric: She has a normal mood and affect. Her speech is normal and behavior is normal.   Nursing note and vitals reviewed.       RECENT LABS:     WBC   Date Value Ref Range Status   11/06/2019 2.51 (L) 3.40 - 10.80 10*3/mm3 Final   05/15/2018 2.18 (L) 4.5 - 11.0 10*3/uL Final     RBC   Date Value Ref Range Status   11/06/2019 3.53 (L) 3.77 - 5.28 10*6/mm3 Final   05/15/2018 4.07 4.0 - 5.2 10*6/uL Final     Hemoglobin   Date Value Ref Range Status   11/06/2019 10.0 (L) 12.0 - 15.9 g/dL Final   05/15/2018 12.3 12.0 -  16.0 g/dL Final     Hematocrit   Date Value Ref Range Status   11/06/2019 32.6 (L) 34.0 - 46.6 % Final   05/15/2018 37.7 36.0 - 46.0 % Final     MCV   Date Value Ref Range Status   11/06/2019 92.4 79.0 - 97.0 fL Final   05/15/2018 92.6 80.0 - 100.0 fL Final     MCH   Date Value Ref Range Status   11/06/2019 28.3 26.6 - 33.0 pg Final   05/15/2018 30.2 26.0 - 34.0 pg Final     MCHC   Date Value Ref Range Status   11/06/2019 30.7 (L) 31.5 - 35.7 g/dL Final   05/15/2018 32.6 31.0 - 37.0 g/dL Final     RDW   Date Value Ref Range Status   11/06/2019 15.8 (H) 12.3 - 15.4 % Final   05/15/2018 13.8 12.0 - 16.8 % Final     RDW-SD   Date Value Ref Range Status   11/06/2019 51.7 37.0 - 54.0 fl Final     MPV   Date Value Ref Range Status   11/06/2019 8.5 6.0 - 12.0 fL Final   05/15/2018 9.1 7.8 - 11.0 fL Final     Platelets   Date Value Ref Range Status   11/06/2019 95 (L) 140 - 450 10*3/mm3 Final   05/15/2018 69 (L) 140 - 440 10*3/uL Final     Neutrophil Rel %   Date Value Ref Range Status   05/15/2018 62.9 45 - 80 % Final     Neutrophil %   Date Value Ref Range Status   11/06/2019 65.3 42.7 - 76.0 % Final     Lymphocyte Rel %   Date Value Ref Range Status   05/15/2018 25.2 15 - 50 % Final     Lymphocyte %   Date Value Ref Range Status   11/06/2019 22.7 19.6 - 45.3 % Final     Monocyte Rel %   Date Value Ref Range Status   05/15/2018 9.6 0 - 15 % Final     Monocyte %   Date Value Ref Range Status   11/06/2019 10.0 5.0 - 12.0 % Final     Eosinophil %   Date Value Ref Range Status   11/06/2019 1.6 0.3 - 6.2 % Final   05/15/2018 1.8 0 - 7 % Final     Basophil Rel %   Date Value Ref Range Status   05/15/2018 0.5 0 - 2 % Final     Basophil %   Date Value Ref Range Status   11/06/2019 0.4 0.0 - 1.5 % Final     Neutrophils Absolute   Date Value Ref Range Status   05/15/2018 1.37 (L) 2.0 - 8.8 10*3/uL Final     Neutrophils, Absolute   Date Value Ref Range Status   11/06/2019 1.64 (L) 1.70 - 7.00 10*3/mm3 Final     Lymphocytes Absolute    Date Value Ref Range Status   05/15/2018 0.55 (L) 1.2 - 3.4 10*3/uL Final     Lymphocytes, Absolute   Date Value Ref Range Status   11/06/2019 0.57 (L) 0.70 - 3.10 10*3/mm3 Final     Monocytes Absolute   Date Value Ref Range Status   05/15/2018 0.21 0.0 - 1.7 10*3/uL Final     Monocytes, Absolute   Date Value Ref Range Status   11/06/2019 0.25 0.10 - 0.90 10*3/mm3 Final     Eosinophils Absolute   Date Value Ref Range Status   05/15/2018 0.04 0.0 - 0.8 10*3/uL Final     Eosinophils, Absolute   Date Value Ref Range Status   11/06/2019 0.04 0.00 - 0.40 10*3/mm3 Final     Basophils Absolute   Date Value Ref Range Status   05/15/2018 0.01 0.0 - 0.2 10*3/uL Final     Basophils, Absolute   Date Value Ref Range Status   11/06/2019 0.01 0.00 - 0.20 10*3/mm3 Final     nRBC   Date Value Ref Range Status   07/16/2019 0.1 0.0 - 0.2 /100 WBC Final       Lab Results   Component Value Date    GLUCOSE 96 11/06/2019    BUN 14 11/06/2019    CREATININE 1.08 (H) 11/06/2019    EGFRIFNONA 50 (L) 11/06/2019    BCR 13.0 11/06/2019    K 3.8 11/06/2019    CO2 30.0 (H) 11/06/2019    CALCIUM 8.5 (L) 11/06/2019    ALBUMIN 3.30 (L) 11/06/2019    LABIL2 1.2 05/17/2019    AST 13 11/06/2019    ALT 8 11/06/2019         Assessment/Plan      ASSESSMENT:     1. Pancytopenia  2. Portal hypertension with ascites  3. History of non-Hodgkin's lymphoma status post chemotherapy in 2011  4. Cirrhosis from an CURIEL  5. Constipation  6. Right chest wall squamous cell carcinoma of the skin  7. ECOG 1    PLAN:      1. Reviewed CBC results.  CBC shows pancytopenia.  Pancytopenia is related to portal hypertension.  She received iron infusion hemoglobin  improved to 10.0 MCV has normalized.  Normalized I will check iron studies today.  Recheck CBC in 2 weeks.  No growth factors at this time.    2. Treatment for non-Hodgkin's lymphoma was in 2011 I will obtain restaging CT scans given the weight loss.  Restaging CT scans in July 2019- for evidence of recurrence of  lymphoma.  15 mm of fluid collection/mass in the pelvis will repeat a scan in 4 weeks which will be 3 months from the prior scan.  No evidence of recurrence of disease.  3. Check CMP  4. Ascites-massively distended abdomen I will arrange for paracentesis  5. Portal hypertension is related to her cirrhosis causing pancytopenia no evidence of bleeding  6. Appointment will be set up with [facility] regarding her cirrhosis.  7. Lactulose prescription 30 cc to take 3-4 times a day was given for her constipation.  8. Ammonia level was normal.  9. I will send her to see Dr. Ryan regarding the lesion on the back of the chest which likely another squamous cell carcinoma.  10. I will see her back in my office in 2 weeks time      I have reviewed labs results, pathology, imaging, vitals, and medications with the patient today.       Patient verbalized understanding and is in agreement of the above plan.          This report was compiled using Dragon voice recognition software. I have made every effort to proof read this document; however, typographical errors may persist.

## 2019-11-07 NOTE — PROCEDURES
"    Procedure:Ultrasound guided Paracentesis    Consent: Informed    Vitals:    11/07/19 1340   BP: 117/54   Pulse: 84   Resp: 24   Temp: 98.5 °F (36.9 °C)   SpO2: 98%   Weight: 95 kg (209 lb 6.4 oz)   Height: 157.5 cm (62\")       Anesthesia Provider: Mo Rojas MD    Anesthesia type: Local infiltration with 1% xylocaine    Indication ascites  Diagnosis cirrhosis  Surgeon: Nessa Rojas MD    Assistant: none    Procedure summary:    Allergies , labs and medications were reviewed. Patient was made to lay supine and the area of interest was imaged with ultrasound and fluid verified and marked. Area of interest was cleaned and draped in a sterile fashion. Subsequently local infiltration with xylocaine. Trocar and canula were advanced. Upon verification of the fluid, trocar was steadied and canula advanced further. Trocar was drawn out. Canula was connected to extension tubing and to the vacuum bottle. Subsequently the canula was removed after completion of the procedure.     Findings: Clear straw yellow fluid was obtained. See nursing nursing progress note for volume drained.    Specimen 40 mL sent for analysis    Complication: none    EBL: 0 ml    Post op condition: Stable. Albumin was given by protocol if needed.    Post op plan: Discharge back to the referring physician.    "

## 2019-11-07 NOTE — PROGRESS NOTES
PARACENTESIS    Time Arrived in Department: 1250      Consent: Yes    Allergies have been Reviewed: Yes    ID Band Verified: Yes    Method: Wheelchair    Lab Results: Checked    Physician: Dr Rojas    Nurse: Francine Whalen RN    IR Care Plan: Person Educated patient Teaching Method verbal per MD and  RN pt verbalized understanding    Neurological: Within Normal Limits    Skin: Flesh, Warm and Dry    Respiratory Status: Respirations even and unlabored    Room In: Room 9 ASC    Procedure: Paracentesis    Time Out Completed: yes    Time Out: 1435    Prep Time:1438    Prep Site 1: Right Lateral Abdomen     Prep: Chlorhexidine and Sterile drape    Procedure Position: Right Side    Guidance: Ultrasound    Fluid Quality: Clear yellow    Procedure Note:  Pt tolerated procedure well    Intra Time 1440    Intra Assess 1: para began  Pain: denies  Skin:Flesh, Warm and Dry  Respiratory Status: Even and Unlabored  Intra Procedure Note 1: alert and oriented        Intra Time 2 1455  Intra Assess 2:   LOC: Alert  Pain: No Issues and Tolerating  Skin:Flesh, Warm and Dry  Respiratory Status: Even and Unlabored  Intra Procedure Note 2: Talking and oriented         Intra Time 3 1510  Intra Assess 3:   LOC: Alert  Pain: No Issues  Skin:Flesh, Warm and Dry  Respiratory Status: Even and Unlabored  Intra Procedure Note pt talking denies needs    Intra Time 4 1525  Intra Assess 4  LOC: Alert  Pain: No issues  Skin: Flesh, Warm, and dry  Respiratory Status: Even and unlabored    Intra Time 5 1540 Procedure complete  Intra Assess 5   LOC: Alert  Pain: No issues  Skin: Flesh, Warm and dry  Respiratory Status: Even unlabored    Post-Procedure Position: Supine    Dressing Site 1: Covaderm    Post Procedure Status:  Alert and Oriented, returned to baseline, Dressing Dry/ Intact, IV documented (see flowsheet), Stable Condition and Dressing properly labeled    Specimen To Lab: no    Total Fluid Removed: 10.6    Post Procedure Note:  Tolerated   well    Report Given To:     Admit/Transfer To:     Transfer Time:     Discharge Time: 9595    Method: Wheelchair    O2 on Transfer: none    Accompanied By: RN    Clothes Changed:  Self    Discharged Location:  Routine to Home    Discharge Teaching:  Care of Dressing, PAtient and Significant Other/ Family

## 2019-11-07 NOTE — PROGRESS NOTES
PARACENTESIS    Time Arrived in Department: 1230      Consent: Yes  Allergies have been Reviewed: Yes      ID Band Verified: Yes    Method: Wheelchair    Lab Results: Checked    Physician: Bob      Nurse: Sarita De La Cruz RN    IR Care Plan: Person Educated patient patient  and Teaching Method verbal      Neurological: Within Normal Limits    Skin: Flesh, Warm and Dry    Respiratory Status: Respirations even and enlabored    Room In: endo recovery      Procedure: Paracentesis    Time Out Completed: yes    Time Out: 1210    Prep Time:1215    Prep Site 1: Right Lateral Abdomen     Prep: Chlorhexidine and Sterile drape    Procedure Position: Right Side    Guidance: Ultrasound    Fluid Quality: Clear yellow    Procedure Note:         Intra Time 1:1216    Intra Assess 1: para began  Pain: denies  Skin:Flesh, Warm and Dry  Respiratory Status: Even and Unlabored  Intra Procedure Note 1:         Intra Time 2:1415  Intra Assess 2:   LOC: Alert  Pain: No Issues and Tolerating  Skin:Flesh, Warm and Dry  Respiratory Status: Even and Unlabored  Intra Procedure Note 2:         Intra Time 3:1435  Intra Assess 3:   LOC: Alert  Pain: No Issues  Skin:Flesh, Warm and Dry  Respiratory Status: Even and Unlabored  Intra Procedure Note 3:para completed                Post-Procedure Position: Supine    Dressing Site 1: Covaderm    Post Procedure Status:  Alert and Oriented, returned to baseline, Dressing Dry/ Intact, IV documented (see flowsheet), Stable Condition and Dressing properly labeled    Specimen To Lab: no    Total Fluid Removed: 5.8L    Post Procedure Note:  yamilet well        Report Given To:     Admit/Transfer To:     Transfer Time:     Discharge Time: 1600    Method: Wheelchair    O2 on Transfer:     Accompanied By:  Spouse    Clothes Changed:  Self    Discharged Location:  Routine to Home    Discharge Teaching:  Care of Dressing, PAtient and Significant Other/ Family

## 2019-11-15 NOTE — PROGRESS NOTES
Subjective   Kathie Cohen is a 69 y.o. female.   69-year-old lady with a medical history of non-Hodgkin's lymphoma and cirrhosis requiring paracentesis this year who I have previously seen for a right chest squamous cell carcinoma which was fully excised and removed.  She recently saw her oncologist who performed a skin exam of her upper back and noticed multiple other ulcerated skin lesions and referred her to me for consideration for surgical removal.  She denies any new symptoms of these skin lesions.  She has had decompensated cirrhosis with pancytopenia and says that she had 20 pounds of ascites removed from her abdomen on a recent paracentesis.  Today she mostly complains about her right ear saying that she thinks that there is a thorn of a rosebush in it that was as a result of a trauma many years ago.  She says that she wants somebody to remove her ear to alleviate her right ear pain.    Objective   /65   Pulse 93   Temp 97.7 °F (36.5 °C) (Oral)   LMP 09/11/2000   SpO2 99%   Physical Exam   Constitutional:   No distress   HENT:   Head: Normocephalic and atraumatic.   She has a scaly scabbed lesion in the right ear   Cardiovascular: Normal rate.   Pulmonary/Chest: Effort normal.   Abdominal:   Abdomen is distended and tense without tenderness to palpation   Neurological: She is alert.   Skin: Skin is warm and dry.   On inspection of her upper back towards the right shoulder there is a 6 mm ulcerated lesion with surrounding patchy appearance, just medial to this there is a 4 mm ulcerated skin lesion medial to this there is a 2 mm ulcerated skin lesion and in the left back there is an approximate 1 mm ulcerated skin lesion.        Assessment/Plan   Kathie was seen today for skin lesion.    Diagnoses and all orders for this visit:    Squamous cell carcinoma of skin  -     Ambulatory Referral to Dermatology    There are at least 4 new skin lesions on her right and left upper back that have developed  since the last time I saw her.  It is unclear to me whether these represent new skin cancers or whether the skin lesions could be as a result of her cirrhosis.  I would imagine with examination of her complete skin there will be more of these therefore I am going to refer her to dermatology to evaluate and manage.  If there is any that they cannot excise that they believed to be cancer then performed wide excision.  She has seen a dermatologist in the past but says that whoever she saw was very demeaning and talked down to her so she does not want to go back to her previous dermatologist.  Will place referral for a new dermatologist.    Frederick Ryan MD  11/15/2019  8:16 AM

## 2019-11-15 NOTE — TELEPHONE ENCOUNTER
"Patient states that her \"ear is killing her\" and would like a referral.  Apparently multiple people have looked at it but no one can give her an answer.  "

## 2019-11-21 NOTE — PROGRESS NOTES
Encounter Date:11/21/2019  New patient      Patient ID: Kathie Cohen is a 69 y.o. female.    Chief Complaint:  New patient  Shortness of breath    History of Present Illness  Patient is a pleasant 69-year-old white female with history of non-Hodgkin's lymphoma-and cirrhosis of the liver he is here for evaluation of increasing shortness of breath which is progressive in nature.  Patient has significant abdominal distention and ascites and is going to have paracentesis.  Patient is not having any chest discomfort heaviness or tightness in the chest.  Patient is not able to take Coreg due to low blood pressure.  Patient has history of hypothyroidism.  No other associated aggravating or alleviating factors.  The symptoms are somewhat constant worse with exertion    Patient is not having any chest discomfort, palpitations, dizziness or syncope.  Denies having any headache ,abdominal pain ,nausea, vomiting , diarrhea constipation, loss of weight or loss of appetite.  Denies having any excessive bruising ,hematuria or blood in the stool.    Review of chronic problems  Chronic ascites  Non-Hodgkin's lymphoma  Hypothyroidism-on supplements  Cirrhosis of the liver.  Assessment and Plan     ]]]]]]]]]]]]]]]]]]]  Impression  =======  -Shortness of breath-probably due to ascites with pressure on the lungs.    -Probable congestive heart failure    -Hypothyroidism hypertension cirrhosis GERD chronic renal dysfunction anemia COPD- portal hypertension    -Non-Hodgkin's lymphoma    -Chronic significant lower extremity lymphedema.    -Status post cholecystectomy tonsillectomy tubal abdominal ligation    -Non-smoker    -Allergic to Demerol  ===========  Plan  =======  Patient has significant shortness of breath most likely multifactorial especially with large ascites pushing up her lungs.  Recent EKG showed sinus rhythm without any ischemic changes.  Patient is unable to take Coreg due to lower blood pressure.  Patient clinically  does not have heart failure.  Patient will have an echocardiogram to assess left ventricular function.  Follow-up in the office on the same day.  Medications were reviewed and updated.  Further plan will depend on patient's progress.  ]]]]]]]]]]]]]]       Diagnosis Plan   1. SOB (shortness of breath) on exertion  Adult Transthoracic Echo Complete W/ Cont if Necessary Per Protocol   2. Hypertension, portal (CMS/HCC)     3. Acquired hypothyroidism     4. Shortness of breath     LAB RESULTS (LAST 7 DAYS)    CBC        BMP        CMP         BNP        TROPONIN        CoAg        Creatinine Clearance  Estimated Creatinine Clearance: 55.2 mL/min (by C-G formula based on SCr of 0.99 mg/dL).    ABG        Radiology  No radiology results for the last day                The following portions of the patient's history were reviewed and updated as appropriate: allergies, current medications, past family history, past medical history, past social history, past surgical history and problem list.    Review of Systems   Constitution: Negative for malaise/fatigue.   Cardiovascular: Positive for leg swelling. Negative for chest pain, palpitations and syncope.   Respiratory: Positive for shortness of breath.    Skin: Negative for rash.   Gastrointestinal: Negative for nausea and vomiting.   Neurological: Positive for light-headedness (when having low BP). Negative for dizziness and numbness.         Current Outpatient Medications:   •  bisacodyl (DULCOLAX) 5 MG EC tablet, Take 5 mg by mouth Daily As Needed for Constipation., Disp: , Rfl:   •  furosemide (LASIX) 20 MG tablet, Take 1 tablet by mouth 2 (Two) Times a Day., Disp: 60 tablet, Rfl: 1  •  lactulose (CHRONULAC) 10 GM/15ML solution solution (encephalopathy), Take 30 mL by mouth 4 (Four) Times a Day for 60 days., Disp: 3600 mL, Rfl: 1  •  levothyroxine (SYNTHROID) 50 MCG tablet, Take 1 tablet by mouth Daily. (Patient taking differently: Take 50 mcg by mouth Daily. NOT GENERIC),  Disp: 90 tablet, Rfl: 3  •  Multiple Vitamins-Minerals (MULTIVITAMIN WITH MINERALS) tablet tablet, Take 1 tablet by mouth Daily., Disp: , Rfl:   •  Sennosides (EX-LAX) 15 MG chewable tablet, Chew 15 mg As Needed., Disp: , Rfl:   •  Unable to find, 1 each 1 (One) Time. Patient states she is taking an OTC suppository stool softener from [employer]., Disp: , Rfl:     Allergies   Allergen Reactions   • Demerol [Meperidine] Other (See Comments)     Patient saw kids coming through the window with their heads off which scared her to death.         Family History   Problem Relation Age of Onset   • Hypertension Other        Past Surgical History:   Procedure Laterality Date   • CATARACT EXTRACTION, BILATERAL     • CHOLECYSTECTOMY     • COLONOSCOPY     • CYSTOSCOPY W/ URETERAL STENT PLACEMENT      CHANGE STENT  EVERY 3 MONTHS   • LIVER BIOPSY     • MEDIPORT INSERTION, SINGLE     • MEDIPORT REMOVAL     • SKIN LESION EXCISION N/A 7/24/2019    Procedure: Excision right chest skin lesion 4.5cm excised diameter, Excision posterior neck skin lesion 1.5 cm excised diameter;  Surgeon: Frederick Ryan MD;  Location: [facility];  Service: General   • TONSILLECTOMY     • TUBAL ABDOMINAL LIGATION     • URETERAL EXPLORATION      ATTEMPT TO REPAIR OBST SECONDARY TO CHEMO       Past Medical History:   Diagnosis Date   • Arthritis    • Cancer (CMS/HCC) 2011    non hodgens lymphoma   • CHF (congestive heart failure) (CMS/HCC)     UNCERTAIN   • Cirrhosis (CMS/HCC)    • Constipation    • COPD (chronic obstructive pulmonary disease) (CMS/HCC)    • CRF (chronic renal failure), stage 3 (moderate) (CMS/HCC)    • GERD (gastroesophageal reflux disease)     RARELY   • History of colon polyps    • History of non-Hodgkin's lymphoma 2011    Treated in 2011   • Hypertension    • Hypothyroidism    • CURIEL (nonalcoholic steatohepatitis)    • Non-healing skin lesion     RIGHT CHEST   • Obesity    • Pancytopenia (CMS/HCC)    • Peripheral edema    • Portal  "hypertension (CMS/HCC)    • Seasonal allergies    • Skin lesions     COMES AND GOES CURRENT ON BACK OF NECK   • Ureteral obstruction     SECONDARY TO CHEMO       Family History   Problem Relation Age of Onset   • Hypertension Other        Social History     Socioeconomic History   • Marital status:      Spouse name: Not on file   • Number of children: Not on file   • Years of education: Not on file   • Highest education level: Not on file   Tobacco Use   • Smoking status: Never Smoker   • Smokeless tobacco: Never Used   Substance and Sexual Activity   • Alcohol use: Yes     Frequency: Monthly or less     Drinks per session: 1 or 2     Comment: Occasionally   • Drug use: No   • Sexual activity: Defer         Procedures      Objective:       Physical Exam    /65 (BP Location: Left arm, Patient Position: Sitting, Cuff Size: Adult)   Pulse 86   Ht 157.5 cm (62\")   Wt 87.8 kg (193 lb 8 oz)   LMP 09/11/2000   SpO2 100%   BMI 35.39 kg/m²   The patient is alert, oriented and in no distress.    Vital signs as noted above.    Head and neck revealed no carotid bruits or jugular venous distension.  No thyromegaly or lymphadenopathy is present.    Lungs clear.  No wheezing.  Breath sounds are normal bilaterally.    Heart normal first and second heart sounds.  No murmur..  No pericardial rub is present.  No gallop is present.    Abdomen-significant ascites.  No organomegaly is present.    Extremities revealed good peripheral pulses.  4+ lower extremity lymphedema.  Skin warm and dry.    Musculoskeletal system is grossly normal.    CNS grossly normal.        "

## 2019-11-27 NOTE — PROGRESS NOTES
Hematology/Oncology Outpatient Follow Up    Kathie Cohen  1950    Primary Care Physician: Garry Jon MD  Referring Physician: Garry Jon*  Chief complaint:  Non-Hodgkin's lymphoma in 2011  Pancytopenia   History of iron deficiency anemia  History of Present Illness:   · Mrs. Cohen has a history of non-Hodgkin's lymphoma in 2011  · March 2019 - Patient was admitted to [facility] with CHF exacerbation when I first saw her in consultation.  Laboratory work up at that time revealed pancytopenia.  Patient has chronic pancytopenia.  · 7/1/2019 CT scan of the chest abdomen pelvis and soft tissue of neck with contrast-1. Interval clearing of the right upper lobe pulmonary infiltrates since the last study.2. Stable subcarinal node measuring approximately 11 mm in short axis diameter.3. New subpleural nodular density in the superior segment of the leftlower lobe measuring 11 mm in diameter.4. CT findings suggesting cirrhosis with splenomegaly and portal hypertension.5. Interval development of ascites.6. Midline umbilical hernia containing fluid.7. Interim insertion of a left-sided double-J ureteral stent. The leftdistal ureter deviates laterally and there are inflammatory changes in the retroperitoneum. There is a new small 15 mm fluid collectionadjacent to the left ureter in this area of inflammation. This may represent an area of kyle disease related to the patient's lymphoma.This has progressed since the last scan.  · 5/30/2019 colonoscopy revealed a tubular adenoma which was benign.  · 7/16/2019 bone marrow biopsy for pancytopenia  Final Diagnosis   Smears of bone marrow aspirate with cell block (clot) preparation and marrow biopsy:    Hypocellular bone marrow (15-20%)    Decreased iron stores    Negative for involvement by malignant lymphoma   Flow cytometry normal cytogenetics normal 46 XX  · CURIEL related cirrhosis and portal hypertension, leading to pancytopenia  · 9/11/2019 CT scan of  the abdomen and pelvis with contrast- 1. Large volume abdominal and pelvic ascites, markedly increased since the most recent comparison of 08/06/2019. 2. No pathologically enlarged lymph nodes in the abdomen or pelvis. 3. Severe splenomegaly. It measures slightly smaller than on the prior exam. 4. Cirrhotic liver morphology. No focal liver lesion. 5. Mild left hydronephrosis, slightly improved since prior exam. Persistent left renal pelvic thickening or fluid. 6. Thickening of the colon may reflect changes of portal colopathy or be related to underlying ascites. Uncomplicated colonic diverticulosis. 7. Trace left pleural effusion.8. Generalized body wall edema.  · 11/6/2019 patient underwent large-volume paracentesis at  F  · Continues lactulose 30 cc 4 times a day        Past Medical History:   Diagnosis Date   • Arthritis    • Cancer (CMS/HCC) 2011    non hodgens lymphoma   • CHF (congestive heart failure) (CMS/HCC)     UNCERTAIN   • Cirrhosis (CMS/HCC)    • Constipation    • COPD (chronic obstructive pulmonary disease) (CMS/HCC)    • CRF (chronic renal failure), stage 3 (moderate) (CMS/HCC)    • GERD (gastroesophageal reflux disease)     RARELY   • History of colon polyps    • History of non-Hodgkin's lymphoma 2011    Treated in 2011   • Hypertension    • Hypothyroidism    • CURIEL (nonalcoholic steatohepatitis)    • Non-healing skin lesion     RIGHT CHEST   • Obesity    • Pancytopenia (CMS/HCC)    • Peripheral edema    • Portal hypertension (CMS/HCC)    • Seasonal allergies    • Skin lesions     COMES AND GOES CURRENT ON BACK OF NECK   • Ureteral obstruction     SECONDARY TO CHEMO     · 1. Ms. Cohen has a history of non-Hodgkin’s lymphoma diagnosed in  2011.  At that time she  was treated under the care of Dr. Julianna Brito and Dr. Waller with R+CHOP for six cycles. Patient has been in remission since then.   · Patient was evaluated by her primary care physician and  noted that she had an abnormal CBC and  patient was sent to [facility] and seen  initially by me on 4/27/16.   · 4/27/16 - Bone marrow aspirate showed normocellular bone marrow.  Cellularity 30% to 40%. Normal unremarkable megakaryocytes.  Adequate iron stores.  Negative for involvement by lymphoma or carcinoma.  Flow cytometry (N).  Cytogenetics 46 XY.  · 5/3/16 - Whole body PET/CT scan revealed splenomegaly and cirrhosis with spleen size of 19 cm.  Negative for lymphadenopathy.  Small amount of free fluid in the pelvis.    · 11/9/16 to March 2019 - Patient lost to follow up.    2.  Squamous cell carcinoma on the skin on right chest in August 2019  · Specimen #1 (Lesion, right chest, excision):  Ulcerated moderately differentiated  squamous cell carcinoma  Lesion is completely excised .  · Specimen #2 (Lesion, posterior neck, excision):  Acute and chronic dermatitis with acanthosis, parakeratosis and scale crust  PAS and GMS stains are negative for infectious organisms (valid controls)  No malignancy identified  3.  Iron deficiency anemia/microcytic anemia, chronic GI blood loss, received Feraheme            infusions in August 2019      Past Surgical History:   Procedure Laterality Date   • CATARACT EXTRACTION, BILATERAL     • CHOLECYSTECTOMY     • COLONOSCOPY     • CYSTOSCOPY W/ URETERAL STENT PLACEMENT      CHANGE STENT  EVERY 3 MONTHS   • LIVER BIOPSY     • MEDIPORT INSERTION, SINGLE     • MEDIPORT REMOVAL     • SKIN LESION EXCISION N/A 7/24/2019    Procedure: Excision right chest skin lesion 4.5cm excised diameter, Excision posterior neck skin lesion 1.5 cm excised diameter;  Surgeon: Frederick Ryan MD;  Location: [facility] OR;  Service: General   • TONSILLECTOMY     • TUBAL ABDOMINAL LIGATION     • URETERAL EXPLORATION      ATTEMPT TO REPAIR OBST SECONDARY TO CHEMO         Current Outpatient Medications:   •  furosemide (LASIX) 20 MG tablet, Take 1 tablet by mouth 2 (Two) Times a Day., Disp: 60 tablet, Rfl: 1  •  lactulose  (CHRONULAC) 10 GM/15ML solution solution (encephalopathy), Take 30 mL by mouth 4 (Four) Times a Day for 60 days., Disp: 3600 mL, Rfl: 1  •  levothyroxine (SYNTHROID) 50 MCG tablet, Take 1 tablet by mouth Daily. (Patient taking differently: Take 50 mcg by mouth Daily. NOT GENERIC), Disp: 90 tablet, Rfl: 3  •  Multiple Vitamins-Minerals (MULTIVITAMIN WITH MINERALS) tablet tablet, Take 1 tablet by mouth Daily., Disp: , Rfl:   •  Sennosides (EX-LAX) 15 MG chewable tablet, Chew 15 mg As Needed., Disp: , Rfl:   •  Unable to find, 1 each 1 (One) Time. Patient states she is taking an OTC suppository stool softener from [employer]., Disp: , Rfl:   •  bisacodyl (DULCOLAX) 5 MG EC tablet, Take 5 mg by mouth Daily As Needed for Constipation., Disp: , Rfl:     Allergies   Allergen Reactions   • Demerol [Meperidine] Other (See Comments)     Patient saw kids coming through the window with their heads off which scared her to death.         Family History   Problem Relation Age of Onset   • Hypertension Other        Cancer-related family history is not on file.    Social History     Tobacco Use   • Smoking status: Never Smoker   • Smokeless tobacco: Never Used   Substance Use Topics   • Alcohol use: Yes     Frequency: Monthly or less     Drinks per session: 1 or 2     Comment: Occasionally   • Drug use: No       I have reviewed the history of present illness, past medical history, family history, social history, lab results, all notes and other records since the patient was last seen on 9/11/2019    SUBJECTIVE:      Patient is my office for follow-up of her history of chronic iron deficiency anemia and pancytopenia. Symptomatically she is improved after paracentesis.  Still has shortness of air.  Chest pain. Lost weight.continues to work    ROS:     Review of Systems   Constitutional: Negative for fever.   HENT: Negative for nosebleeds and trouble swallowing.    Eyes: Negative for visual disturbance.   Respiratory: Negative for cough,  "shortness of breath and wheezing.    Cardiovascular: Negative for chest pain.   Gastrointestinal: Negative for abdominal pain and blood in stool.   Endocrine: Negative for cold intolerance.   Genitourinary: Negative for dysuria and hematuria.   Musculoskeletal: Negative for joint swelling.   Skin: Negative for rash.   Allergic/Immunologic: Negative for environmental allergies.   Neurological: Negative for seizures.   Hematological: Does not bruise/bleed easily.   Psychiatric/Behavioral: The patient is not nervous/anxious.          Objective:       Vitals:    11/27/19 1015   BP: 115/66   Pulse: 82   Resp: 16   Temp: 97.7 °F (36.5 °C)   Weight: 85.8 kg (189 lb 3.2 oz)   Height: 157.5 cm (62\")       Vital signs reviewed  PHYSICAL EXAM:     Physical Exam   Constitutional: She is oriented to person, place, and time. No distress.   HENT:   Head: Normocephalic and atraumatic.   Eyes: Conjunctivae and EOM are normal. Right eye exhibits no discharge. Left eye exhibits no discharge. No scleral icterus.   Neck: Trachea normal and normal range of motion. Neck supple. No thyromegaly present.   Cardiovascular: Normal rate, regular rhythm, S1 normal, S2 normal and normal heart sounds. Exam reveals no gallop and no friction rub.   Tachycardia   Pulmonary/Chest: Effort normal and breath sounds normal. No stridor. No respiratory distress. She has no wheezes.   Abdominal: Soft. Bowel sounds are normal. She exhibits no mass. There is no hepatosplenomegaly. There is no tenderness. There is no rebound and no guarding.   Distended soft not tender positive ascites.   Musculoskeletal: Normal range of motion. She exhibits no tenderness.   3+ bilateral lower extremity edema   Lymphadenopathy:     She has no cervical adenopathy.   Neurological: She is alert and oriented to person, place, and time. She has normal strength. She exhibits normal muscle tone.   Skin: Skin is warm and dry. No rash noted. She is not diaphoretic. No erythema.   Mass " excision site on the right chest wall scar.  1.5 x 1.5 cm red scaly lesion on the posterior right upper chest   Psychiatric: She has a normal mood and affect. Her speech is normal and behavior is normal.   Nursing note and vitals reviewed.       RECENT LABS:     WBC   Date Value Ref Range Status   11/27/2019 2.86 (L) 3.40 - 10.80 10*3/mm3 Final   05/15/2018 2.18 (L) 4.5 - 11.0 10*3/uL Final     RBC   Date Value Ref Range Status   11/27/2019 3.59 (L) 3.77 - 5.28 10*6/mm3 Final   05/15/2018 4.07 4.0 - 5.2 10*6/uL Final     Hemoglobin   Date Value Ref Range Status   11/27/2019 10.1 (L) 12.0 - 15.9 g/dL Final   05/15/2018 12.3 12.0 - 16.0 g/dL Final     Hematocrit   Date Value Ref Range Status   11/27/2019 33.2 (L) 34.0 - 46.6 % Final   05/15/2018 37.7 36.0 - 46.0 % Final     MCV   Date Value Ref Range Status   11/27/2019 92.5 79.0 - 97.0 fL Final   05/15/2018 92.6 80.0 - 100.0 fL Final     MCH   Date Value Ref Range Status   11/27/2019 28.1 26.6 - 33.0 pg Final   05/15/2018 30.2 26.0 - 34.0 pg Final     MCHC   Date Value Ref Range Status   11/27/2019 30.4 (L) 31.5 - 35.7 g/dL Final   05/15/2018 32.6 31.0 - 37.0 g/dL Final     RDW   Date Value Ref Range Status   11/27/2019 15.8 (H) 12.3 - 15.4 % Final   05/15/2018 13.8 12.0 - 16.8 % Final     RDW-SD   Date Value Ref Range Status   11/27/2019 51.3 37.0 - 54.0 fl Final     MPV   Date Value Ref Range Status   11/27/2019 8.3 6.0 - 12.0 fL Final   05/15/2018 9.1 7.8 - 11.0 fL Final     Platelets   Date Value Ref Range Status   11/27/2019 94 (L) 140 - 450 10*3/mm3 Final   05/15/2018 69 (L) 140 - 440 10*3/uL Final     Neutrophil Rel %   Date Value Ref Range Status   05/15/2018 62.9 45 - 80 % Final     Neutrophil %   Date Value Ref Range Status   11/27/2019 60.9 42.7 - 76.0 % Final     Lymphocyte Rel %   Date Value Ref Range Status   05/15/2018 25.2 15 - 50 % Final     Lymphocyte %   Date Value Ref Range Status   11/27/2019 25.9 19.6 - 45.3 % Final     Monocyte Rel %   Date  Value Ref Range Status   05/15/2018 9.6 0 - 15 % Final     Monocyte %   Date Value Ref Range Status   11/27/2019 12.2 (H) 5.0 - 12.0 % Final     Eosinophil %   Date Value Ref Range Status   11/27/2019 0.7 0.3 - 6.2 % Final   05/15/2018 1.8 0 - 7 % Final     Basophil Rel %   Date Value Ref Range Status   05/15/2018 0.5 0 - 2 % Final     Basophil %   Date Value Ref Range Status   11/27/2019 0.3 0.0 - 1.5 % Final     Immature Grans %   Date Value Ref Range Status   11/13/2019 0.3 0.0 - 0.5 % Final     Neutrophils Absolute   Date Value Ref Range Status   05/15/2018 1.37 (L) 2.0 - 8.8 10*3/uL Final     Neutrophils, Absolute   Date Value Ref Range Status   11/27/2019 1.74 1.70 - 7.00 10*3/mm3 Final     Lymphocytes Absolute   Date Value Ref Range Status   05/15/2018 0.55 (L) 1.2 - 3.4 10*3/uL Final     Lymphocytes, Absolute   Date Value Ref Range Status   11/27/2019 0.74 0.70 - 3.10 10*3/mm3 Final     Monocytes Absolute   Date Value Ref Range Status   05/15/2018 0.21 0.0 - 1.7 10*3/uL Final     Monocytes, Absolute   Date Value Ref Range Status   11/27/2019 0.35 0.10 - 0.90 10*3/mm3 Final     Eosinophils Absolute   Date Value Ref Range Status   05/15/2018 0.04 0.0 - 0.8 10*3/uL Final     Eosinophils, Absolute   Date Value Ref Range Status   11/27/2019 0.02 0.00 - 0.40 10*3/mm3 Final     Basophils Absolute   Date Value Ref Range Status   05/15/2018 0.01 0.0 - 0.2 10*3/uL Final     Basophils, Absolute   Date Value Ref Range Status   11/27/2019 0.01 0.00 - 0.20 10*3/mm3 Final     Immature Grans, Absolute   Date Value Ref Range Status   11/13/2019 0.01 0.00 - 0.05 10*3/mm3 Final     nRBC   Date Value Ref Range Status   11/13/2019 0.0 0.0 - 0.2 /100 WBC Final       Lab Results   Component Value Date    GLUCOSE 85 11/13/2019    BUN 15 11/13/2019    CREATININE 0.99 11/13/2019    EGFRIFNONA 56 (L) 11/13/2019    BCR 15.2 11/13/2019    K 3.9 11/13/2019    CO2 26.3 11/13/2019    CALCIUM 8.7 11/13/2019    ALBUMIN 3.30 (L) 11/06/2019     LABIL2 1.2 05/17/2019    AST 13 11/06/2019    ALT 8 11/06/2019         Assessment/Plan      ASSESSMENT:     1. Pancytopenia  2. Portal hypertension with ascites  3. History of non-Hodgkin's lymphoma status post chemotherapy in 2011  4. Dyspnea  5. Cirrhosis from an CURIEL  6. Constipation  7. Right chest wall squamous cell carcinoma of the skin  8. ECOG 1    PLAN:      1. Reviewed CBC results.  CBC shows pancytopenia.  Pancytopenia is related to portal hypertension.  She received iron infusion hemoglobin  improved to 10.0 MCV has normalized.  Follow CBC on return to visit.  2. Treatment for non-Hodgkin's lymphoma was in 2011 I will obtain restaging CT scans given the weight loss.  Restaging CT scans in July 2019- for evidence of recurrence of lymphoma.  15 mm of fluid collection/mass in the pelvis will repeat a scan in 4 weeks which will be 3 months from the prior scan.  No evidence of recurrence of disease.  3. Ascites-massively distended abdomen I will arrange for paracentesis, to be done next week per patient's request  4. Portal hypertension is related to her cirrhosis causing pancytopenia no evidence of bleeding  5. Continue lactulose 30 cc 4 times daily  6. Patient is scheduled to see Dr. Kendrick regarding the skin mass on the back  7. I will see her back in my office in 4 weeks time      I have reviewed labs results, pathology, imaging, vitals, and medications with the patient today.       Patient verbalized understanding and is in agreement of the above plan.          This report was compiled using Dragon voice recognition software. I have made every effort to proof read this document; however, typographical errors may persist.

## 2019-12-04 NOTE — PROCEDURES
"Ultraosund guided paracentesis  Date/Time: 12/4/2019 11:30 AM  Performed by: Jayesh Shen MD  Authorized by: Jayesh Shen MD   Consent: Written consent obtained.  Risks and benefits: risks, benefits and alternatives were discussed  Consent given by: patient  Patient understanding: patient states understanding of the procedure being performed  Patient consent: the patient's understanding of the procedure matches consent given  Procedure consent: procedure consent matches procedure scheduled  Relevant documents: relevant documents present and verified  Test results: test results available and properly labeled  Site marked: the operative site was marked  Imaging studies: imaging studies available  Required items: required blood products, implants, devices, and special equipment available  Patient identity confirmed: verbally with patient  Time out: Immediately prior to procedure a \"time out\" was called to verify the correct patient, procedure, equipment, support staff and site/side marked as required.  Procedure purpose: therapeutic  Indications: abdominal discomfort secondary to ascites  Anesthesia: local infiltration    Anesthesia:  Local Anesthetic: lidocaine 1% without epinephrine  Preparation: Patient was prepped and draped in the usual sterile fashion.  Needle gauge: 20  Puncture site: right lower quadrant  Fluid appearance: serous  Patient tolerance: Patient tolerated the procedure well with no immediate complications  Comments: Please see nurses note for amount of fluid and albumin given.        "

## 2019-12-05 NOTE — PROGRESS NOTES
Encounter Date:12/05/2019  Last seen 11/21/2019      Patient ID: Kathie Cohen is a 69 y.o. female.    Chief Complaint:    Shortness of breath     History of Present Illness  Patient is a pleasant 69-year-old white female with history of non-Hodgkin's lymphoma-and cirrhosis of the liver he is here for evaluation of increasing shortness of breath which is progressive in nature.  Patient has significant abdominal distention and ascites and is going to have paracentesis.  Patient is not having any chest discomfort heaviness or tightness in the chest.  Patient is not able to take Coreg due to low blood pressure.  Patient has history of hypothyroidism.  No other associated aggravating or alleviating factors.  The symptoms are somewhat constant worse with exertion     Patient is not having any chest discomfort, palpitations, dizziness or syncope.  Denies having any headache ,abdominal pain ,nausea, vomiting , diarrhea constipation, loss of weight or loss of appetite.  Denies having any excessive bruising ,hematuria or blood in the stool.     Review of chronic problems  Chronic ascites  Non-Hodgkin's lymphoma  Hypothyroidism-on supplements  Cirrhosis of the liver.  Assessment and Plan      ]]]]]]]]]]]]]]]]]]]  Impression  =======  -Shortness of breath-probably due to ascites with pressure on the lungs.  Echocardiogram 12/5/2019 revealed mild proximal inferior wall hypokinesis with ejection fraction of 55%.    -Probable congestive heart failure     -Hypothyroidism hypertension cirrhosis GERD chronic renal dysfunction anemia COPD- portal hypertension     -Non-Hodgkin's lymphoma     -Chronic significant lower extremity lymphedema.     -Status post cholecystectomy tonsillectomy tubal abdominal ligation     -Non-smoker     -Allergic to Demerol  ===========  Plan  =======  Patient has significant shortness of breath most likely multifactorial especially with large ascites pushing up her lungs.  Doing better now.  Recent EKG  showed sinus rhythm without any ischemic changes.  Patient is unable to take Coreg due to lower blood pressure.  Continue to hold Coreg.  Patient clinically does not have heart failure.  Echocardiogram with ejection fraction of 55%.  Proximal inferior wall hypokinesis  Medications were reviewed and updated.  Follow-up in the office in 6 months.  Further plan will depend on patient's progress.  ]]]]]]]]]]]]]]     Diagnosis Plan   1. Hypertension, portal (CMS/HCC)     2. Systolic congestive heart failure, unspecified HF chronicity (CMS/HCC)     3. Acquired hypothyroidism     LAB RESULTS (LAST 7 DAYS)    CBC  Results from last 7 days   Lab Units 12/04/19  0921   WBC 10*3/mm3 2.00*   RBC 10*6/mm3 3.30*   HEMOGLOBIN g/dL 9.6*   HEMATOCRIT % 29.0*   MCV fL 87.9   PLATELETS 10*3/mm3 85*       BMP        CMP         BNP        TROPONIN        CoAg  Results from last 7 days   Lab Units 12/04/19  0921   INR  1.27*       Creatinine Clearance  Estimated Creatinine Clearance: 51.2 mL/min (by C-G formula based on SCr of 0.99 mg/dL).    ABG        Radiology  No radiology results for the last day                The following portions of the patient's history were reviewed and updated as appropriate: allergies, current medications, past family history, past medical history, past social history, past surgical history and problem list.    Review of Systems   Constitution: Negative for malaise/fatigue.   Cardiovascular: Positive for leg swelling. Negative for chest pain, palpitations and syncope.   Respiratory: Negative for shortness of breath.    Skin: Negative for rash.   Gastrointestinal: Negative for nausea and vomiting.   Neurological: Negative for dizziness, light-headedness and numbness.         Current Outpatient Medications:   •  bisacodyl (DULCOLAX) 5 MG EC tablet, Take 5 mg by mouth Daily As Needed for Constipation., Disp: , Rfl:   •  furosemide (LASIX) 20 MG tablet, Take 1 tablet by mouth 2 (Two) Times a Day., Disp: 60  tablet, Rfl: 1  •  lactulose (CHRONULAC) 10 GM/15ML solution solution (encephalopathy), Take 30 mL by mouth 4 (Four) Times a Day for 60 days., Disp: 3600 mL, Rfl: 1  •  levothyroxine (SYNTHROID) 50 MCG tablet, Take 1 tablet by mouth Daily. (Patient taking differently: Take 50 mcg by mouth Daily. NOT GENERIC), Disp: 90 tablet, Rfl: 3  •  Multiple Vitamins-Minerals (MULTIVITAMIN WITH MINERALS) tablet tablet, Take 1 tablet by mouth Daily., Disp: , Rfl:   •  Sennosides (EX-LAX) 15 MG chewable tablet, Chew 15 mg As Needed., Disp: , Rfl:   •  Unable to find, 1 each 1 (One) Time. Patient states she is taking an OTC suppository stool softener from [employer]., Disp: , Rfl:   No current facility-administered medications for this visit.     Facility-Administered Medications Ordered in Other Visits:   •  albumin human 25 % IV SOLN 25 g, 25 g, Intravenous, Daily PRN, Helen Deras APRN, Stopped at 12/04/19 1210    Allergies   Allergen Reactions   • Demerol [Meperidine] Other (See Comments)     Patient saw kids coming through the window with their heads off which scared her to death.         Family History   Problem Relation Age of Onset   • Hypertension Other        Past Surgical History:   Procedure Laterality Date   • CATARACT EXTRACTION, BILATERAL     • CHOLECYSTECTOMY     • COLONOSCOPY     • CYSTOSCOPY W/ URETERAL STENT PLACEMENT      CHANGE STENT  EVERY 3 MONTHS   • LIVER BIOPSY     • MEDIPORT INSERTION, SINGLE     • MEDIPORT REMOVAL     • SKIN LESION EXCISION N/A 7/24/2019    Procedure: Excision right chest skin lesion 4.5cm excised diameter, Excision posterior neck skin lesion 1.5 cm excised diameter;  Surgeon: Frederick Ryan MD;  Location: [facility] MAIN OR;  Service: General   • TONSILLECTOMY     • TUBAL ABDOMINAL LIGATION     • URETERAL EXPLORATION      ATTEMPT TO REPAIR OBST SECONDARY TO CHEMO       Past Medical History:   Diagnosis Date   • Arthritis    • Cancer (CMS/HCC) 2011    non hodgens lymphoma   • CHF  "(congestive heart failure) (CMS/HCC)     UNCERTAIN   • Cirrhosis (CMS/HCC)    • Constipation    • COPD (chronic obstructive pulmonary disease) (CMS/HCC)    • CRF (chronic renal failure), stage 3 (moderate) (CMS/HCC)    • GERD (gastroesophageal reflux disease)     RARELY   • History of colon polyps    • History of non-Hodgkin's lymphoma 2011    Treated in 2011   • Hypertension    • Hypothyroidism    • CURIEL (nonalcoholic steatohepatitis)    • Non-healing skin lesion     RIGHT CHEST   • Obesity    • Pancytopenia (CMS/HCC)    • Peripheral edema    • Portal hypertension (CMS/HCC)    • Seasonal allergies    • Skin lesions     COMES AND GOES CURRENT ON BACK OF NECK   • Ureteral obstruction     SECONDARY TO CHEMO       Family History   Problem Relation Age of Onset   • Hypertension Other        Social History     Socioeconomic History   • Marital status:      Spouse name: Not on file   • Number of children: Not on file   • Years of education: Not on file   • Highest education level: Not on file   Tobacco Use   • Smoking status: Never Smoker   • Smokeless tobacco: Never Used   Substance and Sexual Activity   • Alcohol use: Yes     Frequency: Monthly or less     Drinks per session: 1 or 2     Comment: Occasionally   • Drug use: No   • Sexual activity: Defer         Procedures      Objective:       Physical Exam    BP 99/55 (BP Location: Left arm, Patient Position: Sitting, Cuff Size: Adult)   Pulse 89   Ht 157.5 cm (62\")   Wt 76.2 kg (168 lb)   LMP 09/11/2000   SpO2 100%   BMI 30.73 kg/m²   The patient is alert, oriented and in no distress.    Vital signs as noted above.    Head and neck revealed no carotid bruits or jugular venous distension.  No thyromegaly or lymphadenopathy is present.    Lungs clear.  No wheezing.  Breath sounds are normal bilaterally.    Heart normal first and second heart sounds.  No murmur..  No pericardial rub is present.  No gallop is present.    Abdomen soft and nontender.  No " organomegaly is present.    Extremities revealed good peripheral pulses.  1+ edema  Skin warm and dry.    Musculoskeletal system is grossly normal.    CNS grossly normal.

## 2019-12-23 PROBLEM — R91.1 SOLITARY LUNG NODULE: Status: ACTIVE | Noted: 2019-01-01

## 2019-12-23 NOTE — PROGRESS NOTES
Hematology/Oncology Outpatient Follow Up    Kathie Cohen  1950    Primary Care Physician: Garry Jon MD  Referring Physician: Garry Jon*  Chief complaint:  Non-Hodgkin's lymphoma in 2011  Pancytopenia   History of iron deficiency anemia  History of Present Illness:   · Mrs. Cohen has a history of non-Hodgkin's lymphoma in 2011  · March 2019 - Patient was admitted to [facility] with CHF exacerbation when I first saw her in consultation.  Laboratory work up at that time revealed pancytopenia.  Patient has chronic pancytopenia.  · 7/1/2019 CT scan of the chest abdomen pelvis and soft tissue of neck with contrast-1. Interval clearing of the right upper lobe pulmonary infiltrates since the last study.2. Stable subcarinal node measuring approximately 11 mm in short axis diameter.3. New subpleural nodular density in the superior segment of the leftlower lobe measuring 11 mm in diameter.4. CT findings suggesting cirrhosis with splenomegaly and portal hypertension.5. Interval development of ascites.6. Midline umbilical hernia containing fluid.7. Interim insertion of a left-sided double-J ureteral stent. The leftdistal ureter deviates laterally and there are inflammatory changes in the retroperitoneum. There is a new small 15 mm fluid collectionadjacent to the left ureter in this area of inflammation. This may represent an area of kyle disease related to the patient's lymphoma.This has progressed since the last scan.  · 5/30/2019 colonoscopy revealed a tubular adenoma which was benign.  · 7/16/2019 bone marrow biopsy for pancytopenia  Final Diagnosis   Smears of bone marrow aspirate with cell block (clot) preparation and marrow biopsy:    Hypocellular bone marrow (15-20%)    Decreased iron stores    Negative for involvement by malignant lymphoma   Flow cytometry normal cytogenetics normal 46 XX  · CURIEL related cirrhosis and portal hypertension, leading to pancytopenia  · 9/11/2019 CT scan of  the abdomen and pelvis with contrast- 1. Large volume abdominal and pelvic ascites, markedly increased since the most recent comparison of 08/06/2019. 2. No pathologically enlarged lymph nodes in the abdomen or pelvis. 3. Severe splenomegaly. It measures slightly smaller than on the prior exam. 4. Cirrhotic liver morphology. No focal liver lesion. 5. Mild left hydronephrosis, slightly improved since prior exam. Persistent left renal pelvic thickening or fluid. 6. Thickening of the colon may reflect changes of portal colopathy or be related to underlying ascites. Uncomplicated colonic diverticulosis. 7. Trace left pleural effusion.8. Generalized body wall edema.  · 11/6/2019 patient underwent large-volume paracentesis at  F  · Continues lactulose 30 cc 4 times a day  · 12/4/2019 paracentesis-9.9 L removed.        Past Medical History:   Diagnosis Date   • Arthritis    • Cancer (CMS/HCC) 2011    non hodgens lymphoma   • CHF (congestive heart failure) (CMS/HCC)     UNCERTAIN   • Cirrhosis (CMS/HCC)    • Constipation    • COPD (chronic obstructive pulmonary disease) (CMS/HCC)    • CRF (chronic renal failure), stage 3 (moderate) (CMS/HCC)    • GERD (gastroesophageal reflux disease)     RARELY   • History of colon polyps    • History of non-Hodgkin's lymphoma 2011    Treated in 2011   • Hypertension    • Hypothyroidism    • CURIEL (nonalcoholic steatohepatitis)    • Non-healing skin lesion     RIGHT CHEST   • Obesity    • Pancytopenia (CMS/HCC)    • Peripheral edema    • Portal hypertension (CMS/HCC)    • Seasonal allergies    • Skin lesions     COMES AND GOES CURRENT ON BACK OF NECK   • Ureteral obstruction     SECONDARY TO CHEMO     · 1. Ms. Cohen has a history of non-Hodgkin’s lymphoma diagnosed in  2011.  At that time she  was treated under the care of Dr. Julianna Brito and Dr. Waller with R+CHOP for six cycles. Patient has been in remission since then.   · Patient was evaluated by her primary care physician and   noted that she had an abnormal CBC and patient was sent to [facility] and seen  initially by me on 4/27/16.   · 4/27/16 - Bone marrow aspirate showed normocellular bone marrow.  Cellularity 30% to 40%. Normal unremarkable megakaryocytes.  Adequate iron stores.  Negative for involvement by lymphoma or carcinoma.  Flow cytometry (N).  Cytogenetics 46 XY.  · 5/3/16 - Whole body PET/CT scan revealed splenomegaly and cirrhosis with spleen size of 19 cm.  Negative for lymphadenopathy.  Small amount of free fluid in the pelvis.    · 11/9/16 to March 2019 - Patient lost to follow up.    2.  Squamous cell carcinoma on the skin on right chest in August 2019  · Specimen #1 (Lesion, right chest, excision):  Ulcerated moderately differentiated  squamous cell carcinoma  Lesion is completely excised .  · Specimen #2 (Lesion, posterior neck, excision):  Acute and chronic dermatitis with acanthosis, parakeratosis and scale crust  PAS and GMS stains are negative for infectious organisms (valid controls)  No malignancy identified  · 12/23/19 Patient on Bensal cream to right scapular lesions per Dr. Johnson.   3.  Iron deficiency anemia/microcytic anemia, chronic GI blood loss, received Feraheme infusions in August 2019      Past Surgical History:   Procedure Laterality Date   • CATARACT EXTRACTION, BILATERAL     • CHOLECYSTECTOMY     • COLONOSCOPY     • CYSTOSCOPY W/ URETERAL STENT PLACEMENT      CHANGE STENT  EVERY 3 MONTHS   • LIVER BIOPSY     • MEDIPORT INSERTION, SINGLE     • MEDIPORT REMOVAL     • SKIN LESION EXCISION N/A 7/24/2019    Procedure: Excision right chest skin lesion 4.5cm excised diameter, Excision posterior neck skin lesion 1.5 cm excised diameter;  Surgeon: Frederick Ryan MD;  Location: [facility] OR;  Service: General   • TONSILLECTOMY     • TUBAL ABDOMINAL LIGATION     • URETERAL EXPLORATION      ATTEMPT TO REPAIR OBST SECONDARY TO CHEMO         Current Outpatient Medications:   •  BENSAL HP  3-6 % ointment, , Disp: , Rfl:   •  bisacodyl (DULCOLAX) 5 MG EC tablet, Take 5 mg by mouth Daily As Needed for Constipation., Disp: , Rfl:   •  furosemide (LASIX) 20 MG tablet, Take 1 tablet by mouth 2 (Two) Times a Day. (Patient taking differently: Take 20 mg by mouth As Needed.), Disp: 60 tablet, Rfl: 1  •  lactulose (CHRONULAC) 10 GM/15ML solution solution (encephalopathy), Take 30 mL by mouth 4 (Four) Times a Day for 60 days., Disp: 3600 mL, Rfl: 1  •  levothyroxine (SYNTHROID) 50 MCG tablet, Take 1 tablet by mouth Daily. (Patient taking differently: Take 50 mcg by mouth Daily. NOT GENERIC), Disp: 90 tablet, Rfl: 3  •  Multiple Vitamins-Minerals (MULTIVITAMIN WITH MINERALS) tablet tablet, Take 1 tablet by mouth Daily., Disp: , Rfl:   •  Sennosides (EX-LAX) 15 MG chewable tablet, Chew 15 mg As Needed., Disp: , Rfl:   •  Unable to find, 1 each 1 (One) Time. Patient states she is taking an OTC suppository stool softener from [facility] [employer]., Disp: , Rfl:   •  vitamin C (ASCORBIC ACID) 250 MG tablet, Take 250 mg by mouth Daily., Disp: , Rfl:   •  lactulose (CHRONULAC) 10 GM/15ML solution, 4 (Four) Times a Day., Disp: , Rfl:     Allergies   Allergen Reactions   • Demerol [Meperidine] Other (See Comments)     Patient saw kids coming through the window with their heads off which scared her to death.         Family History   Problem Relation Age of Onset   • Hypertension Other        Cancer-related family history is not on file.    Social History     Tobacco Use   • Smoking status: Never Smoker   • Smokeless tobacco: Never Used   Substance Use Topics   • Alcohol use: Yes     Comment: Occasionally   • Drug use: No       I have reviewed the history of present illness, past medical history, family history, social history, lab results, all notes and other records since the patient was last seen on 9/11/2019    SUBJECTIVE:      Patient is my office for follow-up of her history of chronic iron deficiency anemia and pancytopenia.   "She continues to work at the [employer] but complains of occasional constipation and headache.  She has some sinus congestion using normal saline nasal spray.  The lesions on her right shoulder are getting better she saw Dr. martha conroy today and she is using a special cream, Bensal. He said the lesions were not cancer and she has follow-up again in February for reevaluation.   She has follow-up with Dr. Balderas and GI scheduled.  She has received a flu vaccine.  She denies any overt bleeding or infections.  She has a paracentesis scheduled for 1/2/2019.      ROS:     Review of Systems   Constitutional: Negative for diaphoresis, fatigue, fever and unexpected weight change.   HENT: Positive for congestion. Negative for nosebleeds and trouble swallowing.    Eyes: Negative.  Negative for visual disturbance.   Respiratory: Negative for cough, shortness of breath and wheezing.    Cardiovascular: Positive for leg swelling (chronic, Lasix PRN. ). Negative for chest pain.   Gastrointestinal: Positive for abdominal distention and constipation. Negative for abdominal pain, blood in stool, diarrhea, nausea and vomiting.   Endocrine: Negative for cold intolerance and heat intolerance.   Genitourinary: Negative for dysuria and hematuria.   Musculoskeletal: Negative for arthralgias and joint swelling.   Skin: Positive for wound (right shoulder. ). Negative for rash.   Allergic/Immunologic: Positive for immunocompromised state. Negative for environmental allergies.   Neurological: Positive for headaches. Negative for seizures and numbness.   Hematological: Does not bruise/bleed easily.   Psychiatric/Behavioral: Negative for confusion. The patient is not nervous/anxious.    All other systems reviewed and are negative.        Objective:       Vitals:    12/23/19 1118   BP: 113/69   Pulse: 82   Resp: 18   Temp: 97.7 °F (36.5 °C)   Weight: 80.5 kg (177 lb 6.4 oz)   Height: 157.5 cm (62\")   PainSc: 0-No pain       Vital signs " reviewed  PHYSICAL EXAM:     Physical Exam   Constitutional: She is oriented to person, place, and time. She appears well-developed and well-nourished. No distress.   HENT:   Head: Normocephalic and atraumatic.   Mouth/Throat: Oropharynx is clear and moist.   Eyes: Pupils are equal, round, and reactive to light. Conjunctivae, EOM and lids are normal. Right eye exhibits no discharge. Left eye exhibits no discharge. No scleral icterus.   Neck: Trachea normal and normal range of motion. Neck supple. No thyromegaly present.   Cardiovascular: Normal rate, regular rhythm, S1 normal, S2 normal and normal heart sounds. Exam reveals no gallop and no friction rub.   No murmur heard.  Tachycardia   Pulmonary/Chest: Effort normal and breath sounds normal. No stridor. No respiratory distress. She has no wheezes.   Abdominal: Soft. Normal appearance and bowel sounds are normal. She exhibits distension. She exhibits no mass. There is no hepatosplenomegaly. There is no tenderness. There is no rebound and no guarding.   Distended soft not tender positive ascites.   Genitourinary:   Genitourinary Comments: Deferred.   Musculoskeletal: Normal range of motion. She exhibits no edema or tenderness.   2+ bilateral lower extremity edema   Lymphadenopathy:     She has no cervical adenopathy.     She has no axillary adenopathy.        Right: No supraclavicular adenopathy present.        Left: No supraclavicular adenopathy present.   Neurological: She is alert and oriented to person, place, and time. She has normal strength. She exhibits normal muscle tone.   Skin: Skin is warm and dry. Capillary refill takes less than 2 seconds. No bruising, no petechiae and no rash noted. She is not diaphoretic. No erythema.   Mass excision site on the right chest wall scar.  Two 0.5 cm  Flat normopigmented lesions on right scapula.    Psychiatric: She has a normal mood and affect. Her speech is normal and behavior is normal. Judgment and thought content  normal. Cognition and memory are normal.   Nursing note and vitals reviewed.       RECENT LABS:     WBC   Date Value Ref Range Status   12/23/2019 3.00 (L) 3.40 - 10.80 10*3/mm3 Final   05/15/2018 2.18 (L) 4.5 - 11.0 10*3/uL Final     RBC   Date Value Ref Range Status   12/23/2019 3.60 (L) 3.77 - 5.28 10*6/mm3 Final   05/15/2018 4.07 4.0 - 5.2 10*6/uL Final     Hemoglobin   Date Value Ref Range Status   12/23/2019 10.2 (L) 12.0 - 15.9 g/dL Final   05/15/2018 12.3 12.0 - 16.0 g/dL Final     Hematocrit   Date Value Ref Range Status   12/23/2019 33.0 (L) 34.0 - 46.6 % Final   05/15/2018 37.7 36.0 - 46.0 % Final     MCV   Date Value Ref Range Status   12/23/2019 91.7 79.0 - 97.0 fL Final   05/15/2018 92.6 80.0 - 100.0 fL Final     MCH   Date Value Ref Range Status   12/23/2019 28.3 26.6 - 33.0 pg Final   05/15/2018 30.2 26.0 - 34.0 pg Final     MCHC   Date Value Ref Range Status   12/23/2019 30.9 (L) 31.5 - 35.7 g/dL Final   05/15/2018 32.6 31.0 - 37.0 g/dL Final     RDW   Date Value Ref Range Status   12/23/2019 15.0 12.3 - 15.4 % Final   05/15/2018 13.8 12.0 - 16.8 % Final     RDW-SD   Date Value Ref Range Status   12/23/2019 48.8 37.0 - 54.0 fl Final     MPV   Date Value Ref Range Status   12/23/2019 7.9 6.0 - 12.0 fL Final   05/15/2018 9.1 7.8 - 11.0 fL Final     Platelets   Date Value Ref Range Status   12/23/2019 115 (L) 140 - 450 10*3/mm3 Final   05/15/2018 69 (L) 140 - 440 10*3/uL Final     Neutrophil Rel %   Date Value Ref Range Status   05/15/2018 62.9 45 - 80 % Final     Neutrophil %   Date Value Ref Range Status   12/23/2019 64.4 42.7 - 76.0 % Final     Lymphocyte Rel %   Date Value Ref Range Status   05/15/2018 25.2 15 - 50 % Final     Lymphocyte %   Date Value Ref Range Status   12/23/2019 24.0 19.6 - 45.3 % Final     Monocyte Rel %   Date Value Ref Range Status   05/15/2018 9.6 0 - 15 % Final     Monocyte %   Date Value Ref Range Status   12/23/2019 10.3 5.0 - 12.0 % Final     Eosinophil %   Date Value  Ref Range Status   12/23/2019 1.0 0.3 - 6.2 % Final   05/15/2018 1.8 0 - 7 % Final     Basophil Rel %   Date Value Ref Range Status   05/15/2018 0.5 0 - 2 % Final     Basophil %   Date Value Ref Range Status   12/23/2019 0.3 0.0 - 1.5 % Final     Immature Grans %   Date Value Ref Range Status   11/13/2019 0.3 0.0 - 0.5 % Final     Neutrophils Absolute   Date Value Ref Range Status   05/15/2018 1.37 (L) 2.0 - 8.8 10*3/uL Final     Neutrophils, Absolute   Date Value Ref Range Status   12/23/2019 1.93 1.70 - 7.00 10*3/mm3 Final     Lymphocytes Absolute   Date Value Ref Range Status   05/15/2018 0.55 (L) 1.2 - 3.4 10*3/uL Final     Lymphocytes, Absolute   Date Value Ref Range Status   12/23/2019 0.72 0.70 - 3.10 10*3/mm3 Final     Monocytes Absolute   Date Value Ref Range Status   05/15/2018 0.21 0.0 - 1.7 10*3/uL Final     Monocytes, Absolute   Date Value Ref Range Status   12/23/2019 0.31 0.10 - 0.90 10*3/mm3 Final     Eosinophils Absolute   Date Value Ref Range Status   05/15/2018 0.04 0.0 - 0.8 10*3/uL Final     Eosinophils, Absolute   Date Value Ref Range Status   12/23/2019 0.03 0.00 - 0.40 10*3/mm3 Final     Basophils Absolute   Date Value Ref Range Status   05/15/2018 0.01 0.0 - 0.2 10*3/uL Final     Basophils, Absolute   Date Value Ref Range Status   12/23/2019 0.01 0.00 - 0.20 10*3/mm3 Final     Immature Grans, Absolute   Date Value Ref Range Status   11/13/2019 0.01 0.00 - 0.05 10*3/mm3 Final     nRBC   Date Value Ref Range Status   11/13/2019 0.0 0.0 - 0.2 /100 WBC Final       Lab Results   Component Value Date    GLUCOSE 85 11/13/2019    BUN 15 11/13/2019    CREATININE 0.99 11/13/2019    EGFRIFNONA 56 (L) 11/13/2019    BCR 15.2 11/13/2019    K 3.9 11/13/2019    CO2 26.3 11/13/2019    CALCIUM 8.7 11/13/2019    ALBUMIN 3.30 (L) 11/06/2019    LABIL2 1.2 05/17/2019    AST 13 11/06/2019    ALT 8 11/06/2019         Assessment/Plan      ASSESSMENT:     1. Pancytopenia  2. Portal hypertension with  ascites  3. History of non-Hodgkin's lymphoma status post chemotherapy in 2011  4. Dyspnea  5. Cirrhosis from an CURIEL  6. Constipation  7. Right chest wall squamous cell carcinoma of the skin  8. ECOG 1    PLAN:      1. Reviewed CBC results.  CBC shows pancytopenia but counts have overall improved..  Pancytopenia is related to portal hypertension.  Follow CBC on return to visit.  2. Treatment for non-Hodgkin's lymphoma was in 2011 I will obtain restaging CT scans given the weight loss.  Restaging CT scans in July 2019- for evidence of recurrence of lymphoma.  She had a left lower lobe lung nodule seen on chest CT in August 2019 that was new we will recheck a chest CT.    3. Ascites-massively distended abdomen, paracentesis scheduled for 1/2/2019.   4. Portal hypertension is related to her cirrhosis causing pancytopenia no evidence of bleeding  5. Continue lactulose 30 cc 4 times daily-ammonia level and CMP checked.  She has a follow-up with [facility] in 1 month.  6. Patient is scheduled to see Dr. Kendrick regarding the skin mass on the back-she is on a cream, the lesions are non-malignant.  Lesions are improving.  7. Left hydronephrosis- improved on CT A/P 10/30/2019.  Follow-up with Dr. Balderas.  8. Follow-up with Dr. Schmidt in 1 month.    I have reviewed labs results, pathology, imaging, vitals, and medications with the patient today.       Patient verbalized understanding and is in agreement of the above plan.  Electronically signed by ANGIE George, 12/23/19, 1:34 PM.

## 2019-12-25 NOTE — ED PROVIDER NOTES
Subjective   Patient is a 69-year-old female who slipped and fell.  She complains of pain to her right shoulder her right knee and her right hip.  She also planes of laceration to her face.  Denies loss of consciousness dizziness vomiting or other complaint.          Review of Systems  For head pain loss of consciousness dizziness vomiting neck pain chest pain shortness of breath abdominal pain back pain or other complaint of injury  Past Medical History:   Diagnosis Date   • Arthritis    • Cancer (CMS/HCC) 2011    non hodgens lymphoma   • CHF (congestive heart failure) (CMS/HCC)     UNCERTAIN   • Cirrhosis (CMS/HCC)    • Constipation    • COPD (chronic obstructive pulmonary disease) (CMS/HCC)    • CRF (chronic renal failure), stage 3 (moderate) (CMS/HCC)    • GERD (gastroesophageal reflux disease)     RARELY   • History of colon polyps    • History of non-Hodgkin's lymphoma 2011    Treated in 2011   • Hypertension    • Hypothyroidism    • CURIEL (nonalcoholic steatohepatitis)    • Non-healing skin lesion     RIGHT CHEST   • Obesity    • Pancytopenia (CMS/HCC)    • Peripheral edema    • Portal hypertension (CMS/HCC)    • Seasonal allergies    • Skin lesions     COMES AND GOES CURRENT ON BACK OF NECK   • Ureteral obstruction     SECONDARY TO CHEMO       Allergies   Allergen Reactions   • Demerol [Meperidine] Other (See Comments)     Patient saw kids coming through the window with their heads off which scared her to death.         Past Surgical History:   Procedure Laterality Date   • CATARACT EXTRACTION, BILATERAL     • CHOLECYSTECTOMY     • COLONOSCOPY     • CYSTOSCOPY W/ URETERAL STENT PLACEMENT      CHANGE STENT  EVERY 3 MONTHS   • LIVER BIOPSY     • MEDIPORT INSERTION, SINGLE     • MEDIPORT REMOVAL     • SKIN LESION EXCISION N/A 7/24/2019    Procedure: Excision right chest skin lesion 4.5cm excised diameter, Excision posterior neck skin lesion 1.5 cm excised diameter;  Surgeon: Frederick Ryan MD;  Location:   [facility];  Service: General   • TONSILLECTOMY     • TUBAL ABDOMINAL LIGATION     • URETERAL EXPLORATION      ATTEMPT TO REPAIR OBST SECONDARY TO CHEMO       Family History   Problem Relation Age of Onset   • Hypertension Other        Social History     Socioeconomic History   • Marital status:      Spouse name: Not on file   • Number of children: Not on file   • Years of education: Not on file   • Highest education level: Not on file   Tobacco Use   • Smoking status: Never Smoker   • Smokeless tobacco: Never Used   Substance and Sexual Activity   • Alcohol use: Yes     Comment: Occasionally   • Drug use: No   • Sexual activity: Defer           Objective   Physical Exam  HEENT exam shows patient have a small superficial laceration to her right forehead.  TMs are clear.  Offer is clear.  Neck nontender.  Neurologic exam is nonfocal.  Lungs are clear.  Chest nontender.  Abdomen soft nontender.  Extremity exam shows mild pain to palpation of her right shoulder her right hip and right knee.  There is no swelling or ecchymosis.  Procedures           ED Course          No radiology results for the last day                                        MDM  Number of Diagnoses or Management Options  Diagnosis management comments: Patient findings consistent with contusion of her right shoulder her right hip and her right knee.  She also has a forehead laceration that was cleaned and a Steri-Strip was applied patient will be discharged with a prescription for Naprosyn.  She will see MD for recheck as needed.    Risk of Complications, Morbidity, and/or Mortality  Presenting problems: moderate  Diagnostic procedures: moderate  Management options: moderate    Patient Progress  Patient progress: stable      Final diagnoses:   Facial laceration, initial encounter   Contusion of multiple sites of right shoulder, initial encounter   Contusion of right hip, initial encounter   Contusion of right knee, initial encounter   Fall,  initial encounter            Victor Manuel Elizalde MD  12/25/19 9789

## 2019-12-30 NOTE — TELEPHONE ENCOUNTER
No one at this office can give her that kind of note without seeing her  See if there is anything available for her tomorrow

## 2020-01-01 ENCOUNTER — APPOINTMENT (OUTPATIENT)
Dept: GENERAL RADIOLOGY | Facility: HOSPITAL | Age: 70
End: 2020-01-01

## 2020-01-01 ENCOUNTER — APPOINTMENT (OUTPATIENT)
Dept: INTERVENTIONAL RADIOLOGY/VASCULAR | Facility: HOSPITAL | Age: 70
End: 2020-01-01

## 2020-01-01 ENCOUNTER — APPOINTMENT (OUTPATIENT)
Dept: LAB | Facility: HOSPITAL | Age: 70
End: 2020-01-01

## 2020-01-01 ENCOUNTER — ANESTHESIA EVENT (OUTPATIENT)
Dept: MEDSURG UNIT | Facility: HOSPITAL | Age: 70
End: 2020-01-01

## 2020-01-01 ENCOUNTER — APPOINTMENT (OUTPATIENT)
Dept: ULTRASOUND IMAGING | Facility: HOSPITAL | Age: 70
End: 2020-01-01

## 2020-01-01 ENCOUNTER — HOSPITAL ENCOUNTER (EMERGENCY)
Facility: HOSPITAL | Age: 70
Discharge: HOME | End: 2020-04-19
Admitting: EMERGENCY MEDICINE

## 2020-01-01 ENCOUNTER — LAB REQUISITION (OUTPATIENT)
Dept: LAB | Facility: HOSPITAL | Age: 70
End: 2020-01-01

## 2020-01-01 ENCOUNTER — HOSPITAL ENCOUNTER (INPATIENT)
Facility: HOSPITAL | Age: 70
LOS: 1 days | Discharge: HOSPICE | End: 2020-04-22
Attending: INTERNAL MEDICINE | Admitting: INTERNAL MEDICINE

## 2020-01-01 ENCOUNTER — TRANSCRIBE ORDERS (OUTPATIENT)
Dept: ADMINISTRATIVE | Facility: HOSPITAL | Age: 70
End: 2020-01-01

## 2020-01-01 ENCOUNTER — READMISSION MANAGEMENT (OUTPATIENT)
Dept: CALL CENTER | Facility: HOSPITAL | Age: 70
End: 2020-01-01

## 2020-01-01 ENCOUNTER — HOSPITAL ENCOUNTER (EMERGENCY)
Facility: HOSPITAL | Age: 70
Discharge: HOME | End: 2020-01-17
Attending: EMERGENCY MEDICINE | Admitting: EMERGENCY MEDICINE

## 2020-01-01 ENCOUNTER — TELEPHONE (OUTPATIENT)
Dept: INTERNAL MEDICINE | Facility: CLINIC | Age: 70
End: 2020-01-01

## 2020-01-01 ENCOUNTER — HOSPITAL ENCOUNTER (INPATIENT)
Facility: HOSPITAL | Age: 70
LOS: 7 days | Discharge: HOME HEALTH | End: 2020-02-07
Attending: INTERNAL MEDICINE | Admitting: HOSPITALIST

## 2020-01-01 ENCOUNTER — TELEPHONE (OUTPATIENT)
Dept: FAMILY MEDICINE CLINIC | Facility: CLINIC | Age: 70
End: 2020-01-01

## 2020-01-01 ENCOUNTER — OFFICE VISIT (OUTPATIENT)
Dept: FAMILY MEDICINE CLINIC | Facility: CLINIC | Age: 70
End: 2020-01-01

## 2020-01-01 ENCOUNTER — TELEPHONE (OUTPATIENT)
Dept: ONCOLOGY | Facility: CLINIC | Age: 70
End: 2020-01-01

## 2020-01-01 ENCOUNTER — INPATIENT HOSPITAL (AMBULATORY)
Dept: URBAN - METROPOLITAN AREA HOSPITAL 84 | Facility: HOSPITAL | Age: 70
End: 2020-01-01
Payer: COMMERCIAL

## 2020-01-01 ENCOUNTER — APPOINTMENT (OUTPATIENT)
Dept: CT IMAGING | Facility: HOSPITAL | Age: 70
End: 2020-01-01

## 2020-01-01 ENCOUNTER — HOSPITAL ENCOUNTER (OUTPATIENT)
Dept: PET IMAGING | Facility: HOSPITAL | Age: 70
End: 2020-01-01

## 2020-01-01 ENCOUNTER — HOSPITAL ENCOUNTER (OUTPATIENT)
Dept: GENERAL RADIOLOGY | Facility: HOSPITAL | Age: 70
Discharge: HOME | End: 2020-02-25
Admitting: NURSE PRACTITIONER

## 2020-01-01 ENCOUNTER — APPOINTMENT (OUTPATIENT)
Dept: NUCLEAR MEDICINE | Facility: HOSPITAL | Age: 70
End: 2020-01-01

## 2020-01-01 ENCOUNTER — TELEPHONE (OUTPATIENT)
Dept: ONCOLOGY | Facility: HOSPITAL | Age: 70
End: 2020-01-01

## 2020-01-01 ENCOUNTER — ANESTHESIA EVENT (OUTPATIENT)
Dept: PERIOP | Facility: HOSPITAL | Age: 70
End: 2020-01-01

## 2020-01-01 ENCOUNTER — HOSPITAL ENCOUNTER (INPATIENT)
Facility: HOSPITAL | Age: 70
LOS: 11 days | Discharge: NURSING, REHAB OR LONG-TERM CARE | End: 2020-04-03
Attending: EMERGENCY MEDICINE | Admitting: SURGERY

## 2020-01-01 ENCOUNTER — ANESTHESIA (OUTPATIENT)
Dept: MEDSURG UNIT | Facility: HOSPITAL | Age: 70
End: 2020-01-01

## 2020-01-01 ENCOUNTER — HOSPITAL ENCOUNTER (OUTPATIENT)
Dept: PET IMAGING | Facility: HOSPITAL | Age: 70
Discharge: HOME | End: 2020-03-10

## 2020-01-01 ENCOUNTER — DOCUMENTATION (OUTPATIENT)
Dept: ONCOLOGY | Facility: CLINIC | Age: 70
End: 2020-01-01

## 2020-01-01 ENCOUNTER — ANESTHESIA (OUTPATIENT)
Dept: PERIOP | Facility: HOSPITAL | Age: 70
End: 2020-01-01

## 2020-01-01 ENCOUNTER — APPOINTMENT (OUTPATIENT)
Dept: CARDIOLOGY | Facility: HOSPITAL | Age: 70
End: 2020-01-01

## 2020-01-01 ENCOUNTER — TREATMENT (OUTPATIENT)
Dept: ONCOLOGY | Facility: HOSPITAL | Age: 70
End: 2020-01-01

## 2020-01-01 ENCOUNTER — OFFICE (AMBULATORY)
Dept: URBAN - METROPOLITAN AREA CLINIC 64 | Facility: CLINIC | Age: 70
End: 2020-01-01

## 2020-01-01 ENCOUNTER — HOSPITAL ENCOUNTER (EMERGENCY)
Facility: HOSPITAL | Age: 70
Discharge: HOME | End: 2020-01-24
Attending: EMERGENCY MEDICINE | Admitting: EMERGENCY MEDICINE

## 2020-01-01 ENCOUNTER — HOSPITAL ENCOUNTER (OUTPATIENT)
Dept: INFUSION THERAPY | Facility: HOSPITAL | Age: 70
Discharge: HOME | End: 2020-01-03
Admitting: HOSPITALIST

## 2020-01-01 ENCOUNTER — DOCUMENTATION (OUTPATIENT)
Dept: INTERNAL MEDICINE | Facility: CLINIC | Age: 70
End: 2020-01-01

## 2020-01-01 ENCOUNTER — HOSPITAL ENCOUNTER (OUTPATIENT)
Dept: PET IMAGING | Facility: HOSPITAL | Age: 70
Discharge: HOME | End: 2020-03-19
Admitting: FAMILY MEDICINE

## 2020-01-01 ENCOUNTER — HOSPITAL ENCOUNTER (OUTPATIENT)
Dept: CARDIOLOGY | Facility: HOSPITAL | Age: 70
Discharge: HOME | End: 2020-04-10
Admitting: PHYSICAL MEDICINE & REHABILITATION

## 2020-01-01 ENCOUNTER — HOSPITAL ENCOUNTER (OUTPATIENT)
Dept: PET IMAGING | Facility: HOSPITAL | Age: 70
Discharge: HOME | End: 2020-03-17
Admitting: FAMILY MEDICINE

## 2020-01-01 ENCOUNTER — APPOINTMENT (OUTPATIENT)
Dept: INFUSION THERAPY | Facility: HOSPITAL | Age: 70
End: 2020-01-01

## 2020-01-01 ENCOUNTER — OFFICE VISIT (OUTPATIENT)
Dept: INTERNAL MEDICINE | Facility: CLINIC | Age: 70
End: 2020-01-01

## 2020-01-01 VITALS
HEART RATE: 110 BPM | HEIGHT: 64 IN | WEIGHT: 175 LBS | SYSTOLIC BLOOD PRESSURE: 95 MMHG | DIASTOLIC BLOOD PRESSURE: 59 MMHG

## 2020-01-01 VITALS
OXYGEN SATURATION: 98 % | DIASTOLIC BLOOD PRESSURE: 64 MMHG | HEART RATE: 78 BPM | TEMPERATURE: 99.9 F | BODY MASS INDEX: 31.09 KG/M2 | HEIGHT: 62 IN | SYSTOLIC BLOOD PRESSURE: 118 MMHG | RESPIRATION RATE: 17 BRPM

## 2020-01-01 VITALS
BODY MASS INDEX: 27.23 KG/M2 | DIASTOLIC BLOOD PRESSURE: 62 MMHG | HEART RATE: 81 BPM | SYSTOLIC BLOOD PRESSURE: 100 MMHG | OXYGEN SATURATION: 98 % | HEIGHT: 62 IN | RESPIRATION RATE: 16 BRPM | WEIGHT: 148 LBS | TEMPERATURE: 97.8 F

## 2020-01-01 VITALS
SYSTOLIC BLOOD PRESSURE: 98 MMHG | HEART RATE: 97 BPM | TEMPERATURE: 96.2 F | HEIGHT: 62 IN | WEIGHT: 154.1 LBS | DIASTOLIC BLOOD PRESSURE: 43 MMHG | OXYGEN SATURATION: 97 % | BODY MASS INDEX: 28.36 KG/M2 | RESPIRATION RATE: 14 BRPM

## 2020-01-01 VITALS
BODY MASS INDEX: 31.28 KG/M2 | OXYGEN SATURATION: 98 % | WEIGHT: 170 LBS | SYSTOLIC BLOOD PRESSURE: 111 MMHG | HEIGHT: 62 IN | DIASTOLIC BLOOD PRESSURE: 44 MMHG | RESPIRATION RATE: 16 BRPM | HEART RATE: 83 BPM

## 2020-01-01 VITALS
SYSTOLIC BLOOD PRESSURE: 108 MMHG | RESPIRATION RATE: 16 BRPM | TEMPERATURE: 97.2 F | BODY MASS INDEX: 25.11 KG/M2 | DIASTOLIC BLOOD PRESSURE: 58 MMHG | OXYGEN SATURATION: 100 % | WEIGHT: 136.47 LBS | HEART RATE: 82 BPM | HEIGHT: 62 IN

## 2020-01-01 VITALS
WEIGHT: 175 LBS | TEMPERATURE: 98.2 F | SYSTOLIC BLOOD PRESSURE: 115 MMHG | RESPIRATION RATE: 16 BRPM | HEART RATE: 89 BPM | BODY MASS INDEX: 32.2 KG/M2 | OXYGEN SATURATION: 98 % | DIASTOLIC BLOOD PRESSURE: 47 MMHG | HEIGHT: 62 IN

## 2020-01-01 VITALS
HEART RATE: 98 BPM | SYSTOLIC BLOOD PRESSURE: 101 MMHG | HEIGHT: 62 IN | RESPIRATION RATE: 16 BRPM | TEMPERATURE: 98.5 F | OXYGEN SATURATION: 100 % | WEIGHT: 144.2 LBS | DIASTOLIC BLOOD PRESSURE: 63 MMHG | BODY MASS INDEX: 26.54 KG/M2

## 2020-01-01 VITALS
DIASTOLIC BLOOD PRESSURE: 71 MMHG | SYSTOLIC BLOOD PRESSURE: 122 MMHG | BODY MASS INDEX: 31.09 KG/M2 | WEIGHT: 170 LBS | OXYGEN SATURATION: 100 % | HEART RATE: 84 BPM | TEMPERATURE: 97.5 F

## 2020-01-01 VITALS
HEIGHT: 60 IN | HEART RATE: 81 BPM | BODY MASS INDEX: 27.92 KG/M2 | SYSTOLIC BLOOD PRESSURE: 112 MMHG | DIASTOLIC BLOOD PRESSURE: 65 MMHG | WEIGHT: 142.2 LBS | TEMPERATURE: 97.6 F | RESPIRATION RATE: 16 BRPM | OXYGEN SATURATION: 97 %

## 2020-01-01 VITALS
OXYGEN SATURATION: 100 % | TEMPERATURE: 97.7 F | HEART RATE: 94 BPM | DIASTOLIC BLOOD PRESSURE: 74 MMHG | SYSTOLIC BLOOD PRESSURE: 112 MMHG

## 2020-01-01 VITALS
HEIGHT: 62 IN | TEMPERATURE: 97.5 F | DIASTOLIC BLOOD PRESSURE: 47 MMHG | HEART RATE: 97 BPM | RESPIRATION RATE: 18 BRPM | OXYGEN SATURATION: 97 % | SYSTOLIC BLOOD PRESSURE: 98 MMHG | BODY MASS INDEX: 26.87 KG/M2 | WEIGHT: 146 LBS

## 2020-01-01 DIAGNOSIS — D73.89 SPLENIC SEQUESTRATION: ICD-10-CM

## 2020-01-01 DIAGNOSIS — R19.5 HEME POSITIVE STOOL: ICD-10-CM

## 2020-01-01 DIAGNOSIS — I85.00 ESOPHAGEAL VARICES WITHOUT BLEEDING: ICD-10-CM

## 2020-01-01 DIAGNOSIS — T14.8XXA MULTIPLE WOUNDS OF SKIN: ICD-10-CM

## 2020-01-01 DIAGNOSIS — N18.30 CHRONIC KIDNEY DISEASE, STAGE III (MODERATE) (HCC): Primary | ICD-10-CM

## 2020-01-01 DIAGNOSIS — K76.6 PORTAL HYPERTENSION: ICD-10-CM

## 2020-01-01 DIAGNOSIS — R19.5 OTHER FECAL ABNORMALITIES: ICD-10-CM

## 2020-01-01 DIAGNOSIS — R10.9 UNSPECIFIED ABDOMINAL PAIN: ICD-10-CM

## 2020-01-01 DIAGNOSIS — S80.01XD CONTUSION OF RIGHT KNEE, SUBSEQUENT ENCOUNTER: ICD-10-CM

## 2020-01-01 DIAGNOSIS — R18.8 CIRRHOSIS OF LIVER WITH ASCITES, UNSPECIFIED HEPATIC CIRRHOSIS TYPE (HCC): Chronic | ICD-10-CM

## 2020-01-01 DIAGNOSIS — R18.8 OTHER ASCITES: ICD-10-CM

## 2020-01-01 DIAGNOSIS — R18.8 OTHER ASCITES: Primary | ICD-10-CM

## 2020-01-01 DIAGNOSIS — Z00.00 ENCOUNTER FOR GENERAL ADULT MEDICAL EXAMINATION WITHOUT ABNORMAL FINDINGS: ICD-10-CM

## 2020-01-01 DIAGNOSIS — N30.90 CYSTITIS: ICD-10-CM

## 2020-01-01 DIAGNOSIS — K74.60 CIRRHOSIS OF LIVER WITH ASCITES, UNSPECIFIED HEPATIC CIRRHOSIS TYPE (HCC): ICD-10-CM

## 2020-01-01 DIAGNOSIS — N13.1 HYDRONEPHROSIS WITH URETERAL STRICTURE, NOT ELSEWHERE CLASSIFIED: ICD-10-CM

## 2020-01-01 DIAGNOSIS — R13.10 DYSPHAGIA, UNSPECIFIED TYPE: ICD-10-CM

## 2020-01-01 DIAGNOSIS — R09.3 EXPECTORATION OF ABNORMAL SPUTUM: ICD-10-CM

## 2020-01-01 DIAGNOSIS — N17.0 ACUTE KIDNEY INJURY (AKI) WITH ACUTE TUBULAR NECROSIS (ATN) (HCC): ICD-10-CM

## 2020-01-01 DIAGNOSIS — K74.60 CIRRHOSIS OF LIVER WITH ASCITES, UNSPECIFIED HEPATIC CIRRHOSIS TYPE (HCC): Chronic | ICD-10-CM

## 2020-01-01 DIAGNOSIS — T07.XXXA MULTIPLE ABRASIONS: Primary | ICD-10-CM

## 2020-01-01 DIAGNOSIS — S40.011D CONTUSION OF RIGHT SHOULDER, SUBSEQUENT ENCOUNTER: ICD-10-CM

## 2020-01-01 DIAGNOSIS — R18.8 ABDOMINAL FLUID COLLECTION: Primary | ICD-10-CM

## 2020-01-01 DIAGNOSIS — K74.60 LIVER CIRRHOSIS SECONDARY TO NASH (HCC): ICD-10-CM

## 2020-01-01 DIAGNOSIS — S41.001D WOUND OF RIGHT SHOULDER, SUBSEQUENT ENCOUNTER: ICD-10-CM

## 2020-01-01 DIAGNOSIS — N18.9 ACUTE RENAL FAILURE SUPERIMPOSED ON CHRONIC KIDNEY DISEASE, UNSPECIFIED CKD STAGE, UNSPECIFIED ACUTE RENAL FAILURE TYPE (HCC): Primary | ICD-10-CM

## 2020-01-01 DIAGNOSIS — H61.113 DEFORMITY OF CARTILAGE OF BOTH EARS: ICD-10-CM

## 2020-01-01 DIAGNOSIS — N18.9 CHRONIC KIDNEY DISEASE, UNSPECIFIED: ICD-10-CM

## 2020-01-01 DIAGNOSIS — I95.89 OTHER HYPOTENSION: ICD-10-CM

## 2020-01-01 DIAGNOSIS — M54.32 SCIATICA OF LEFT SIDE: Primary | ICD-10-CM

## 2020-01-01 DIAGNOSIS — D61.818 PANCYTOPENIA (HCC): Primary | ICD-10-CM

## 2020-01-01 DIAGNOSIS — S70.01XD CONTUSION OF RIGHT HIP, SUBSEQUENT ENCOUNTER: ICD-10-CM

## 2020-01-01 DIAGNOSIS — Z87.19 HISTORY OF ESOPHAGEAL VARICES: ICD-10-CM

## 2020-01-01 DIAGNOSIS — R05.8 PRODUCTIVE COUGH: ICD-10-CM

## 2020-01-01 DIAGNOSIS — K46.0 HERNIA WITH OBSTRUCTION: ICD-10-CM

## 2020-01-01 DIAGNOSIS — Z09 HOSPITAL DISCHARGE FOLLOW-UP: ICD-10-CM

## 2020-01-01 DIAGNOSIS — K43.6 INCARCERATED VENTRAL HERNIA: ICD-10-CM

## 2020-01-01 DIAGNOSIS — R91.1 NODULE OF UPPER LOBE OF LEFT LUNG: Primary | ICD-10-CM

## 2020-01-01 DIAGNOSIS — I50.9 HEART FAILURE, UNSPECIFIED: ICD-10-CM

## 2020-01-01 DIAGNOSIS — K92.0 HEMATEMESIS WITH NAUSEA: ICD-10-CM
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
## 2020-01-01 DIAGNOSIS — D50.9 IRON DEFICIENCY ANEMIA, UNSPECIFIED: ICD-10-CM

## 2020-01-01 DIAGNOSIS — Z76.89 ENCOUNTER TO ESTABLISH CARE: Primary | ICD-10-CM

## 2020-01-01 DIAGNOSIS — N39.0 ACUTE UTI: Primary | ICD-10-CM

## 2020-01-01 DIAGNOSIS — R11.2 NAUSEA AND VOMITING, INTRACTABILITY OF VOMITING NOT SPECIFIED, UNSPECIFIED VOMITING TYPE: Primary | ICD-10-CM

## 2020-01-01 DIAGNOSIS — D50.0 IRON DEFICIENCY ANEMIA DUE TO CHRONIC BLOOD LOSS: Primary | ICD-10-CM

## 2020-01-01 DIAGNOSIS — N17.9 ACUTE RENAL FAILURE SUPERIMPOSED ON CHRONIC KIDNEY DISEASE, UNSPECIFIED CKD STAGE, UNSPECIFIED ACUTE RENAL FAILURE TYPE (HCC): Primary | ICD-10-CM

## 2020-01-01 DIAGNOSIS — E87.5 HYPERKALEMIA: ICD-10-CM

## 2020-01-01 DIAGNOSIS — R06.00 DYSPNEA, UNSPECIFIED: ICD-10-CM

## 2020-01-01 DIAGNOSIS — K42.9 HERNIA, UMBILICAL: ICD-10-CM

## 2020-01-01 DIAGNOSIS — R91.1 NODULE OF UPPER LOBE OF LEFT LUNG: ICD-10-CM

## 2020-01-01 DIAGNOSIS — R11.0 NAUSEA: Primary | ICD-10-CM

## 2020-01-01 DIAGNOSIS — K46.0 HERNIA WITH OBSTRUCTION: Primary | ICD-10-CM

## 2020-01-01 DIAGNOSIS — K74.69 OTHER CIRRHOSIS OF LIVER: ICD-10-CM

## 2020-01-01 DIAGNOSIS — D61.818 PANCYTOPENIA (HCC): ICD-10-CM

## 2020-01-01 DIAGNOSIS — R53.1 WEAKNESS: ICD-10-CM

## 2020-01-01 DIAGNOSIS — K75.81 LIVER CIRRHOSIS SECONDARY TO NASH (HCC): ICD-10-CM

## 2020-01-01 DIAGNOSIS — R18.8 CIRRHOSIS OF LIVER WITH ASCITES, UNSPECIFIED HEPATIC CIRRHOSIS TYPE (HCC): ICD-10-CM

## 2020-01-01 DIAGNOSIS — C85.90 NON-HODGKIN LYMPHOMA, UNSPECIFIED, UNSPECIFIED SITE: ICD-10-CM

## 2020-01-01 LAB
ABO + RH BLD: NORMAL
ABO GROUP BLD: NORMAL
ALBUMIN FLD-MCNC: 1.3 G/DL
ALBUMIN SERPL-MCNC: 2.2 G/DL (ref 3.5–5.2)
ALBUMIN SERPL-MCNC: 2.3 G/DL (ref 3.5–5.2)
ALBUMIN SERPL-MCNC: 2.4 G/DL (ref 3.5–5.2)
ALBUMIN SERPL-MCNC: 2.5 G/DL (ref 2.9–4.4)
ALBUMIN SERPL-MCNC: 2.5 G/DL (ref 3.5–5.2)
ALBUMIN SERPL-MCNC: 2.5 G/DL (ref 3.5–5.2)
ALBUMIN SERPL-MCNC: 2.6 G/DL (ref 3.5–5.2)
ALBUMIN SERPL-MCNC: 2.7 G/DL (ref 3.5–5.2)
ALBUMIN SERPL-MCNC: 2.8 G/DL (ref 3.5–5.2)
ALBUMIN SERPL-MCNC: 2.9 G/DL (ref 3.5–5.2)
ALBUMIN SERPL-MCNC: 3 G/DL (ref 3.5–5.2)
ALBUMIN SERPL-MCNC: 3 G/DL (ref 3.5–5.2)
ALBUMIN SERPL-MCNC: 3.5 G/DL (ref 3.5–5.2)
ALBUMIN/GLOB SERPL: 0.8 G/DL
ALBUMIN/GLOB SERPL: 0.8 {RATIO} (ref 0.7–1.7)
ALBUMIN/GLOB SERPL: 0.9 G/DL
ALBUMIN/GLOB SERPL: 1 G/DL
ALBUMIN/GLOB SERPL: 1.1 G/DL
ALBUMIN/GLOB SERPL: 1.2 G/DL
ALBUMIN/GLOB SERPL: 1.4 G/DL
ALBUMIN/GLOB SERPL: 1.4 G/DL
ALBUMIN/GLOB SERPL: 1.7 G/DL
ALP SERPL-CCNC: 103 U/L (ref 39–117)
ALP SERPL-CCNC: 105 U/L (ref 39–117)
ALP SERPL-CCNC: 109 U/L (ref 39–117)
ALP SERPL-CCNC: 125 U/L (ref 39–117)
ALP SERPL-CCNC: 135 U/L (ref 39–117)
ALP SERPL-CCNC: 143 U/L (ref 39–117)
ALP SERPL-CCNC: 145 U/L (ref 39–117)
ALP SERPL-CCNC: 153 U/L (ref 39–117)
ALP SERPL-CCNC: 153 U/L (ref 39–117)
ALP SERPL-CCNC: 156 U/L (ref 39–117)
ALP SERPL-CCNC: 160 U/L (ref 39–117)
ALP SERPL-CCNC: 161 U/L (ref 39–117)
ALP SERPL-CCNC: 166 U/L (ref 39–117)
ALP SERPL-CCNC: 174 U/L (ref 39–117)
ALP SERPL-CCNC: 66 U/L (ref 39–117)
ALP SERPL-CCNC: 71 U/L (ref 39–117)
ALP SERPL-CCNC: 71 U/L (ref 39–117)
ALP SERPL-CCNC: 75 U/L (ref 39–117)
ALP SERPL-CCNC: 79 U/L (ref 39–117)
ALP SERPL-CCNC: 85 U/L (ref 39–117)
ALP SERPL-CCNC: 87 U/L (ref 39–117)
ALP SERPL-CCNC: 90 U/L (ref 39–117)
ALP SERPL-CCNC: 95 U/L (ref 39–117)
ALPHA1 GLOB FLD ELPH-MCNC: 0.4 G/DL (ref 0–0.4)
ALPHA2 GLOB SERPL ELPH-MCNC: 1 G/DL (ref 0.4–1)
ALT SERPL W P-5'-P-CCNC: 10 U/L (ref 1–33)
ALT SERPL W P-5'-P-CCNC: 11 U/L (ref 1–33)
ALT SERPL W P-5'-P-CCNC: 11 U/L (ref 1–33)
ALT SERPL W P-5'-P-CCNC: 12 U/L (ref 1–33)
ALT SERPL W P-5'-P-CCNC: 12 U/L (ref 1–33)
ALT SERPL W P-5'-P-CCNC: 14 U/L (ref 1–33)
ALT SERPL W P-5'-P-CCNC: 15 U/L (ref 1–33)
ALT SERPL W P-5'-P-CCNC: 16 U/L (ref 1–33)
ALT SERPL W P-5'-P-CCNC: 17 U/L (ref 1–33)
ALT SERPL W P-5'-P-CCNC: 18 U/L (ref 1–33)
ALT SERPL W P-5'-P-CCNC: 18 U/L (ref 1–33)
ALT SERPL W P-5'-P-CCNC: 19 U/L (ref 1–33)
ALT SERPL W P-5'-P-CCNC: 20 U/L (ref 1–33)
ALT SERPL W P-5'-P-CCNC: 20 U/L (ref 1–33)
ALT SERPL W P-5'-P-CCNC: 21 U/L (ref 1–33)
ALT SERPL W P-5'-P-CCNC: 22 U/L (ref 1–33)
ALT SERPL W P-5'-P-CCNC: 8 U/L (ref 1–33)
ALT SERPL W P-5'-P-CCNC: 9 U/L (ref 1–33)
ALT SERPL W P-5'-P-CCNC: 9 U/L (ref 1–33)
AMMONIA BLD-SCNC: 10 UMOL/L (ref 11–51)
AMMONIA BLD-SCNC: 18 UMOL/L (ref 11–51)
AMMONIA BLD-SCNC: 18 UMOL/L (ref 11–51)
AMMONIA BLD-SCNC: 21 UMOL/L (ref 11–51)
AMMONIA BLD-SCNC: 23 UMOL/L (ref 11–51)
AMMONIA BLD-SCNC: 24 UMOL/L (ref 11–51)
AMMONIA BLD-SCNC: 24 UMOL/L (ref 11–51)
AMMONIA BLD-SCNC: 26 UMOL/L (ref 11–51)
AMMONIA BLD-SCNC: 29 UMOL/L (ref 11–51)
AMMONIA BLD-SCNC: 32 UMOL/L (ref 11–51)
AMMONIA BLD-SCNC: 45 UMOL/L (ref 11–51)
AMMONIA BLD-SCNC: 47 UMOL/L (ref 11–51)
AMORPH URATE CRY URNS QL MICRO: ABNORMAL /HPF
ANION GAP SERPL CALCULATED.3IONS-SCNC: 10 MMOL/L (ref 5–15)
ANION GAP SERPL CALCULATED.3IONS-SCNC: 10 MMOL/L (ref 5–15)
ANION GAP SERPL CALCULATED.3IONS-SCNC: 10.4 MMOL/L (ref 5–15)
ANION GAP SERPL CALCULATED.3IONS-SCNC: 11 MMOL/L (ref 5–15)
ANION GAP SERPL CALCULATED.3IONS-SCNC: 11.2 MMOL/L (ref 5–15)
ANION GAP SERPL CALCULATED.3IONS-SCNC: 11.4 MMOL/L (ref 5–15)
ANION GAP SERPL CALCULATED.3IONS-SCNC: 12 MMOL/L (ref 5–15)
ANION GAP SERPL CALCULATED.3IONS-SCNC: 12.1 MMOL/L (ref 5–15)
ANION GAP SERPL CALCULATED.3IONS-SCNC: 12.2 MMOL/L (ref 5–15)
ANION GAP SERPL CALCULATED.3IONS-SCNC: 12.2 MMOL/L (ref 5–15)
ANION GAP SERPL CALCULATED.3IONS-SCNC: 12.4 MMOL/L (ref 5–15)
ANION GAP SERPL CALCULATED.3IONS-SCNC: 12.6 MMOL/L (ref 5–15)
ANION GAP SERPL CALCULATED.3IONS-SCNC: 13 MMOL/L (ref 5–15)
ANION GAP SERPL CALCULATED.3IONS-SCNC: 13.3 MMOL/L (ref 5–15)
ANION GAP SERPL CALCULATED.3IONS-SCNC: 14 MMOL/L (ref 5–15)
ANION GAP SERPL CALCULATED.3IONS-SCNC: 15.1 MMOL/L (ref 5–15)
ANION GAP SERPL CALCULATED.3IONS-SCNC: 16 MMOL/L (ref 5–15)
ANION GAP SERPL CALCULATED.3IONS-SCNC: 16 MMOL/L (ref 5–15)
ANION GAP SERPL CALCULATED.3IONS-SCNC: 16.3 MMOL/L (ref 5–15)
ANION GAP SERPL CALCULATED.3IONS-SCNC: 17 MMOL/L (ref 5–15)
ANION GAP SERPL CALCULATED.3IONS-SCNC: 18.5 MMOL/L (ref 5–15)
ANION GAP SERPL CALCULATED.3IONS-SCNC: 8 MMOL/L (ref 5–15)
ANION GAP SERPL CALCULATED.3IONS-SCNC: 9 MMOL/L (ref 5–15)
ANISOCYTOSIS BLD QL: ABNORMAL
APPEARANCE FLD: ABNORMAL
APPEARANCE FLD: CLEAR
APPEARANCE FLD: CLEAR
APTT HEX PL PPP: 49 SEC (ref 0–11)
APTT PPP: 116 SECONDS (ref 61–76.5)
APTT PPP: 25.1 SECONDS (ref 61–76.5)
APTT PPP: 25.1 SECONDS (ref 61–76.5)
APTT PPP: 27.1 SECONDS (ref 24–31)
APTT PPP: 30.8 SECONDS (ref 61–76.5)
APTT PPP: 34.5 SECONDS (ref 61–76.5)
APTT PPP: 34.7 SECONDS (ref 61–76.5)
APTT PPP: 35.5 SECONDS (ref 22.7–35.4)
APTT PPP: 36 SECONDS (ref 61–76.5)
APTT PPP: 41.5 SECONDS (ref 61–76.5)
APTT PPP: 44.1 SECONDS (ref 61–76.5)
APTT PPP: 45.9 SECONDS (ref 61–76.5)
APTT PPP: 47.6 SECONDS (ref 61–76.5)
APTT PPP: 54.1 SECONDS (ref 61–76.5)
APTT PPP: 54.4 SECONDS (ref 61–76.5)
APTT PPP: 60.5 SECONDS (ref 61–76.5)
APTT PPP: 63.6 SECONDS (ref 61–76.5)
APTT PPP: 72.8 SECONDS (ref 61–76.5)
AST SERPL-CCNC: 10 U/L (ref 1–32)
AST SERPL-CCNC: 10 U/L (ref 1–32)
AST SERPL-CCNC: 12 U/L (ref 1–32)
AST SERPL-CCNC: 12 U/L (ref 1–32)
AST SERPL-CCNC: 14 U/L (ref 1–32)
AST SERPL-CCNC: 15 U/L (ref 1–32)
AST SERPL-CCNC: 16 U/L (ref 1–32)
AST SERPL-CCNC: 17 U/L (ref 1–32)
AST SERPL-CCNC: 18 U/L (ref 1–32)
AST SERPL-CCNC: 19 U/L (ref 1–32)
AST SERPL-CCNC: 19 U/L (ref 1–32)
AST SERPL-CCNC: 21 U/L (ref 1–32)
AST SERPL-CCNC: 22 U/L (ref 1–32)
AST SERPL-CCNC: 23 U/L (ref 1–32)
AST SERPL-CCNC: 23 U/L (ref 1–32)
AST SERPL-CCNC: 25 U/L (ref 1–32)
AST SERPL-CCNC: 26 U/L (ref 1–32)
AST SERPL-CCNC: 8 U/L (ref 1–32)
AST SERPL-CCNC: 9 U/L (ref 1–32)
AT III PPP CHRO-ACNC: 76 % (ref 75–120)
B-GLOBULIN SERPL ELPH-MCNC: 0.6 G/DL (ref 0.7–1.3)
B2 GLYCOPROT1 IGA SER-ACNC: <9 GPI IGA UNITS (ref 0–25)
B2 GLYCOPROT1 IGG SER-ACNC: <9 GPI IGG UNITS (ref 0–20)
B2 GLYCOPROT1 IGM SER-ACNC: 19 GPI IGM UNITS (ref 0–32)
BACTERIA FLD CULT: NORMAL
BACTERIA SPEC AEROBE CULT: ABNORMAL
BACTERIA SPEC AEROBE CULT: NO GROWTH
BACTERIA SPEC AEROBE CULT: NO GROWTH
BACTERIA SPEC AEROBE CULT: NORMAL
BACTERIA SPEC AEROBE CULT: NORMAL
BACTERIA SPEC ANAEROBE CULT: NORMAL
BACTERIA SPEC ANAEROBE CULT: NORMAL
BACTERIA UR QL AUTO: ABNORMAL /HPF
BASOPHILS # BLD AUTO: 0 10*3/MM3 (ref 0–0.2)
BASOPHILS # BLD AUTO: 0.01 10*3/MM3 (ref 0–0.2)
BASOPHILS # BLD AUTO: 0.02 10*3/MM3 (ref 0–0.2)
BASOPHILS # BLD AUTO: 0.03 10*3/MM3 (ref 0–0.2)
BASOPHILS NFR BLD AUTO: 0 % (ref 0–1.5)
BASOPHILS NFR BLD AUTO: 0.1 % (ref 0–1.5)
BASOPHILS NFR BLD AUTO: 0.2 % (ref 0–1.5)
BASOPHILS NFR BLD AUTO: 0.3 % (ref 0–1.5)
BASOPHILS NFR BLD AUTO: 0.4 % (ref 0–1.5)
BASOPHILS NFR BLD AUTO: 0.5 % (ref 0–1.5)
BASOPHILS NFR BLD AUTO: 0.6 % (ref 0–1.5)
BASOPHILS NFR BLD AUTO: 0.7 % (ref 0–1.5)
BH BB BLOOD EXPIRATION DATE: NORMAL
BH BB BLOOD TYPE BARCODE: 5100
BH BB DISPENSE STATUS: NORMAL
BH BB PRODUCT CODE: NORMAL
BH BB UNIT NUMBER: NORMAL
BH CV LOW VAS LEFT COMMON FEMORAL SPONT: 1
BH CV LOW VAS LEFT DISTAL FEMORAL SPONT: 1
BH CV LOW VAS LEFT GASTRONEMIUS VESSEL: 1
BH CV LOW VAS LEFT MID FEMORAL SPONT: 1
BH CV LOW VAS LEFT PERONEAL VESSEL: 1
BH CV LOW VAS LEFT POPLITEAL SPONT: 1
BH CV LOW VAS LEFT POSTERIOR TIBIAL VESSEL: 1
BH CV LOW VAS LEFT POSTERIOR TIBIAL VESSEL: 1
BH CV LOW VAS LEFT PROFUNDA FEMORAL SPONT: 1
BH CV LOW VAS LEFT PROXIMAL FEMORAL SPONT: 1
BH CV LOW VAS LEFT SAPHENOFEMORAL JUNCTION SPONT: 1
BH CV LOW VAS RIGHT COMMON FEMORAL SPONT: 1
BH CV LOW VAS RIGHT DISTAL FEMORAL SPONT: 1
BH CV LOW VAS RIGHT GASTRONEMIUS VESSEL: 1
BH CV LOW VAS RIGHT GREATER SAPH AK VESSEL: 1
BH CV LOW VAS RIGHT LESSER SAPH VESSEL: 1
BH CV LOW VAS RIGHT MID FEMORAL SPONT: 1
BH CV LOW VAS RIGHT MID FEMORAL SPONT: 1
BH CV LOW VAS RIGHT PERONEAL VESSEL: 1
BH CV LOW VAS RIGHT PERONEAL VESSEL: 1
BH CV LOW VAS RIGHT POPLITEAL SPONT: 1
BH CV LOW VAS RIGHT POPLITEAL SPONT: 1
BH CV LOW VAS RIGHT POSTERIOR TIBIAL VESSEL: 1
BH CV LOW VAS RIGHT POSTERIOR TIBIAL VESSEL: 1
BH CV LOW VAS RIGHT PROFUNDA FEMORAL SPONT: 1
BH CV LOW VAS RIGHT PROXIMAL FEMORAL SPONT: 1
BH CV LOW VAS RIGHT SAPHENOFEMORAL JUNCTION SPONT: 1
BH CV LOWER VASCULAR LEFT COMMON FEMORAL AUGMENT: NORMAL
BH CV LOWER VASCULAR LEFT COMMON FEMORAL COMPETENT: NORMAL
BH CV LOWER VASCULAR LEFT COMMON FEMORAL COMPRESS: NORMAL
BH CV LOWER VASCULAR LEFT COMMON FEMORAL PHASIC: NORMAL
BH CV LOWER VASCULAR LEFT COMMON FEMORAL SPONT: NORMAL
BH CV LOWER VASCULAR LEFT COMMON FEMORAL THROMBUS: NORMAL
BH CV LOWER VASCULAR LEFT DISTAL FEMORAL AUGMENT: NORMAL
BH CV LOWER VASCULAR LEFT DISTAL FEMORAL AUGMENT: NORMAL
BH CV LOWER VASCULAR LEFT DISTAL FEMORAL COMPETENT: NORMAL
BH CV LOWER VASCULAR LEFT DISTAL FEMORAL COMPETENT: NORMAL
BH CV LOWER VASCULAR LEFT DISTAL FEMORAL COMPRESS: NORMAL
BH CV LOWER VASCULAR LEFT DISTAL FEMORAL COMPRESS: NORMAL
BH CV LOWER VASCULAR LEFT DISTAL FEMORAL PHASIC: NORMAL
BH CV LOWER VASCULAR LEFT DISTAL FEMORAL PHASIC: NORMAL
BH CV LOWER VASCULAR LEFT DISTAL FEMORAL SPONT: NORMAL
BH CV LOWER VASCULAR LEFT DISTAL FEMORAL SPONT: NORMAL
BH CV LOWER VASCULAR LEFT DISTAL FEMORAL THROMBUS: NORMAL
BH CV LOWER VASCULAR LEFT GASTRONEMIUS COMPRESS: NORMAL
BH CV LOWER VASCULAR LEFT GASTRONEMIUS THROMBUS: NORMAL
BH CV LOWER VASCULAR LEFT GREATER SAPH AK COMPRESS: NORMAL
BH CV LOWER VASCULAR LEFT GREATER SAPH BK COMPRESS: NORMAL
BH CV LOWER VASCULAR LEFT LESSER SAPH COMPRESS: NORMAL
BH CV LOWER VASCULAR LEFT LESSER SAPH COMPRESS: NORMAL
BH CV LOWER VASCULAR LEFT MID FEMORAL AUGMENT: NORMAL
BH CV LOWER VASCULAR LEFT MID FEMORAL COMPETENT: NORMAL
BH CV LOWER VASCULAR LEFT MID FEMORAL COMPRESS: NORMAL
BH CV LOWER VASCULAR LEFT MID FEMORAL PHASIC: NORMAL
BH CV LOWER VASCULAR LEFT MID FEMORAL SPONT: NORMAL
BH CV LOWER VASCULAR LEFT MID FEMORAL THROMBUS: NORMAL
BH CV LOWER VASCULAR LEFT PERONEAL COMPRESS: NORMAL
BH CV LOWER VASCULAR LEFT PERONEAL THROMBUS: NORMAL
BH CV LOWER VASCULAR LEFT POPLITEAL AUGMENT: NORMAL
BH CV LOWER VASCULAR LEFT POPLITEAL COMPETENT: NORMAL
BH CV LOWER VASCULAR LEFT POPLITEAL COMPRESS: NORMAL
BH CV LOWER VASCULAR LEFT POPLITEAL PHASIC: NORMAL
BH CV LOWER VASCULAR LEFT POPLITEAL SPONT: NORMAL
BH CV LOWER VASCULAR LEFT POPLITEAL THROMBUS: NORMAL
BH CV LOWER VASCULAR LEFT POPLITEAL THROMBUS: NORMAL
BH CV LOWER VASCULAR LEFT POSTERIOR TIBIAL COMPRESS: NORMAL
BH CV LOWER VASCULAR LEFT POSTERIOR TIBIAL THROMBUS: NORMAL
BH CV LOWER VASCULAR LEFT POSTERIOR TIBIAL THROMBUS: NORMAL
BH CV LOWER VASCULAR LEFT PROFUNDA FEMORAL COMPRESS: NORMAL
BH CV LOWER VASCULAR LEFT PROFUNDA FEMORAL COMPRESS: NORMAL
BH CV LOWER VASCULAR LEFT PROFUNDA FEMORAL THROMBUS: NORMAL
BH CV LOWER VASCULAR LEFT PROXIMAL FEMORAL AUGMENT: NORMAL
BH CV LOWER VASCULAR LEFT PROXIMAL FEMORAL COMPETENT: NORMAL
BH CV LOWER VASCULAR LEFT PROXIMAL FEMORAL COMPRESS: NORMAL
BH CV LOWER VASCULAR LEFT PROXIMAL FEMORAL PHASIC: NORMAL
BH CV LOWER VASCULAR LEFT PROXIMAL FEMORAL SPONT: NORMAL
BH CV LOWER VASCULAR LEFT PROXIMAL FEMORAL THROMBUS: NORMAL
BH CV LOWER VASCULAR LEFT SAPHENOFEMORAL JUNCTION AUGMENT: NORMAL
BH CV LOWER VASCULAR LEFT SAPHENOFEMORAL JUNCTION AUGMENT: NORMAL
BH CV LOWER VASCULAR LEFT SAPHENOFEMORAL JUNCTION COMPETENT: NORMAL
BH CV LOWER VASCULAR LEFT SAPHENOFEMORAL JUNCTION COMPETENT: NORMAL
BH CV LOWER VASCULAR LEFT SAPHENOFEMORAL JUNCTION COMPRESS: NORMAL
BH CV LOWER VASCULAR LEFT SAPHENOFEMORAL JUNCTION PHASIC: NORMAL
BH CV LOWER VASCULAR LEFT SAPHENOFEMORAL JUNCTION PHASIC: NORMAL
BH CV LOWER VASCULAR LEFT SAPHENOFEMORAL JUNCTION SPONT: NORMAL
BH CV LOWER VASCULAR LEFT SAPHENOFEMORAL JUNCTION SPONT: NORMAL
BH CV LOWER VASCULAR LEFT SAPHENOFEMORAL JUNCTION THROMBUS: NORMAL
BH CV LOWER VASCULAR RIGHT COMMON FEMORAL AUGMENT: NORMAL
BH CV LOWER VASCULAR RIGHT COMMON FEMORAL COMPETENT: NORMAL
BH CV LOWER VASCULAR RIGHT COMMON FEMORAL COMPRESS: NORMAL
BH CV LOWER VASCULAR RIGHT COMMON FEMORAL PHASIC: NORMAL
BH CV LOWER VASCULAR RIGHT COMMON FEMORAL SPONT: NORMAL
BH CV LOWER VASCULAR RIGHT COMMON FEMORAL THROMBUS: NORMAL
BH CV LOWER VASCULAR RIGHT DISTAL FEMORAL AUGMENT: NORMAL
BH CV LOWER VASCULAR RIGHT DISTAL FEMORAL COMPETENT: NORMAL
BH CV LOWER VASCULAR RIGHT DISTAL FEMORAL COMPRESS: NORMAL
BH CV LOWER VASCULAR RIGHT DISTAL FEMORAL COMPRESS: NORMAL
BH CV LOWER VASCULAR RIGHT DISTAL FEMORAL PHASIC: NORMAL
BH CV LOWER VASCULAR RIGHT DISTAL FEMORAL SPONT: NORMAL
BH CV LOWER VASCULAR RIGHT DISTAL FEMORAL THROMBUS: NORMAL
BH CV LOWER VASCULAR RIGHT GASTRONEMIUS COMPRESS: NORMAL
BH CV LOWER VASCULAR RIGHT GASTRONEMIUS COMPRESS: NORMAL
BH CV LOWER VASCULAR RIGHT GASTRONEMIUS THROMBUS: NORMAL
BH CV LOWER VASCULAR RIGHT GREATER SAPH AK COMPRESS: NORMAL
BH CV LOWER VASCULAR RIGHT GREATER SAPH AK COMPRESS: NORMAL
BH CV LOWER VASCULAR RIGHT GREATER SAPH AK THROMBUS: NORMAL
BH CV LOWER VASCULAR RIGHT GREATER SAPH BK COMPRESS: NORMAL
BH CV LOWER VASCULAR RIGHT GREATER SAPH BK COMPRESS: NORMAL
BH CV LOWER VASCULAR RIGHT LESSER SAPH COMPRESS: NORMAL
BH CV LOWER VASCULAR RIGHT LESSER SAPH COMPRESS: NORMAL
BH CV LOWER VASCULAR RIGHT LESSER SAPH THROMBUS: NORMAL
BH CV LOWER VASCULAR RIGHT MID FEMORAL AUGMENT: NORMAL
BH CV LOWER VASCULAR RIGHT MID FEMORAL AUGMENT: NORMAL
BH CV LOWER VASCULAR RIGHT MID FEMORAL COMPETENT: NORMAL
BH CV LOWER VASCULAR RIGHT MID FEMORAL COMPETENT: NORMAL
BH CV LOWER VASCULAR RIGHT MID FEMORAL COMPRESS: NORMAL
BH CV LOWER VASCULAR RIGHT MID FEMORAL COMPRESS: NORMAL
BH CV LOWER VASCULAR RIGHT MID FEMORAL PHASIC: NORMAL
BH CV LOWER VASCULAR RIGHT MID FEMORAL PHASIC: NORMAL
BH CV LOWER VASCULAR RIGHT MID FEMORAL SPONT: NORMAL
BH CV LOWER VASCULAR RIGHT MID FEMORAL SPONT: NORMAL
BH CV LOWER VASCULAR RIGHT MID FEMORAL THROMBUS: NORMAL
BH CV LOWER VASCULAR RIGHT MID FEMORAL THROMBUS: NORMAL
BH CV LOWER VASCULAR RIGHT PERONEAL COMPRESS: NORMAL
BH CV LOWER VASCULAR RIGHT PERONEAL COMPRESS: NORMAL
BH CV LOWER VASCULAR RIGHT PERONEAL THROMBUS: NORMAL
BH CV LOWER VASCULAR RIGHT PERONEAL THROMBUS: NORMAL
BH CV LOWER VASCULAR RIGHT POPLITEAL AUGMENT: NORMAL
BH CV LOWER VASCULAR RIGHT POPLITEAL AUGMENT: NORMAL
BH CV LOWER VASCULAR RIGHT POPLITEAL COMPETENT: NORMAL
BH CV LOWER VASCULAR RIGHT POPLITEAL COMPETENT: NORMAL
BH CV LOWER VASCULAR RIGHT POPLITEAL COMPRESS: NORMAL
BH CV LOWER VASCULAR RIGHT POPLITEAL COMPRESS: NORMAL
BH CV LOWER VASCULAR RIGHT POPLITEAL PHASIC: NORMAL
BH CV LOWER VASCULAR RIGHT POPLITEAL PHASIC: NORMAL
BH CV LOWER VASCULAR RIGHT POPLITEAL SPONT: NORMAL
BH CV LOWER VASCULAR RIGHT POPLITEAL SPONT: NORMAL
BH CV LOWER VASCULAR RIGHT POPLITEAL THROMBUS: NORMAL
BH CV LOWER VASCULAR RIGHT POPLITEAL THROMBUS: NORMAL
BH CV LOWER VASCULAR RIGHT POSTERIOR TIBIAL COMPRESS: NORMAL
BH CV LOWER VASCULAR RIGHT POSTERIOR TIBIAL COMPRESS: NORMAL
BH CV LOWER VASCULAR RIGHT POSTERIOR TIBIAL THROMBUS: NORMAL
BH CV LOWER VASCULAR RIGHT POSTERIOR TIBIAL THROMBUS: NORMAL
BH CV LOWER VASCULAR RIGHT PROFUNDA FEMORAL COMPRESS: NORMAL
BH CV LOWER VASCULAR RIGHT PROFUNDA FEMORAL THROMBUS: NORMAL
BH CV LOWER VASCULAR RIGHT PROXIMAL FEMORAL AUGMENT: NORMAL
BH CV LOWER VASCULAR RIGHT PROXIMAL FEMORAL COMPETENT: NORMAL
BH CV LOWER VASCULAR RIGHT PROXIMAL FEMORAL COMPRESS: NORMAL
BH CV LOWER VASCULAR RIGHT PROXIMAL FEMORAL COMPRESS: NORMAL
BH CV LOWER VASCULAR RIGHT PROXIMAL FEMORAL PHASIC: NORMAL
BH CV LOWER VASCULAR RIGHT PROXIMAL FEMORAL SPONT: NORMAL
BH CV LOWER VASCULAR RIGHT PROXIMAL FEMORAL THROMBUS: NORMAL
BH CV LOWER VASCULAR RIGHT SAPHENOFEMORAL JUNCTION AUGMENT: NORMAL
BH CV LOWER VASCULAR RIGHT SAPHENOFEMORAL JUNCTION AUGMENT: NORMAL
BH CV LOWER VASCULAR RIGHT SAPHENOFEMORAL JUNCTION COMPETENT: NORMAL
BH CV LOWER VASCULAR RIGHT SAPHENOFEMORAL JUNCTION COMPETENT: NORMAL
BH CV LOWER VASCULAR RIGHT SAPHENOFEMORAL JUNCTION COMPRESS: NORMAL
BH CV LOWER VASCULAR RIGHT SAPHENOFEMORAL JUNCTION COMPRESS: NORMAL
BH CV LOWER VASCULAR RIGHT SAPHENOFEMORAL JUNCTION PHASIC: NORMAL
BH CV LOWER VASCULAR RIGHT SAPHENOFEMORAL JUNCTION PHASIC: NORMAL
BH CV LOWER VASCULAR RIGHT SAPHENOFEMORAL JUNCTION SPONT: NORMAL
BH CV LOWER VASCULAR RIGHT SAPHENOFEMORAL JUNCTION SPONT: NORMAL
BH CV LOWER VASCULAR RIGHT SAPHENOFEMORAL JUNCTION THROMBUS: NORMAL
BH CV LOWER VASCULAR RIGHT VARICOSITY AK COMPRESS: NORMAL
BH CV LOWER VASCULAR RIGHT VARICOSITY AK THROMBUS: NORMAL
BH CV POP FLUID COLLECT LEFT: 1
BILIRUB SERPL-MCNC: 0.2 MG/DL (ref 0.2–1.2)
BILIRUB SERPL-MCNC: 0.3 MG/DL (ref 0.2–1.2)
BILIRUB SERPL-MCNC: 0.4 MG/DL (ref 0.2–1.2)
BILIRUB SERPL-MCNC: 0.5 MG/DL (ref 0.2–1.2)
BILIRUB SERPL-MCNC: 0.6 MG/DL (ref 0.2–1.2)
BILIRUB UR QL STRIP: NEGATIVE
BLD GP AB SCN SERPL QL: NEGATIVE
BLD GP AB SCN SERPL QL: NEGATIVE
BLD GP AB SCN SERPL QL: POSITIVE
BLD GP AB SCN SERPL QL: POSITIVE
BUN BLD-MCNC: 24 MG/DL (ref 8–23)
BUN BLD-MCNC: 26 MG/DL (ref 8–23)
BUN BLD-MCNC: 27 MG/DL (ref 8–23)
BUN BLD-MCNC: 28 MG/DL (ref 8–23)
BUN BLD-MCNC: 29 MG/DL (ref 8–23)
BUN BLD-MCNC: 30 MG/DL (ref 8–23)
BUN BLD-MCNC: 33 MG/DL (ref 8–23)
BUN BLD-MCNC: 38 MG/DL (ref 8–23)
BUN BLD-MCNC: 39 MG/DL (ref 8–23)
BUN BLD-MCNC: 40 MG/DL (ref 8–23)
BUN BLD-MCNC: 45 MG/DL (ref 8–23)
BUN BLD-MCNC: 46 MG/DL (ref 8–23)
BUN BLD-MCNC: 47 MG/DL (ref 8–23)
BUN BLD-MCNC: 47 MG/DL (ref 8–23)
BUN BLD-MCNC: 49 MG/DL (ref 8–23)
BUN BLD-MCNC: 50 MG/DL (ref 8–23)
BUN BLD-MCNC: 52 MG/DL (ref 8–23)
BUN BLD-MCNC: 54 MG/DL (ref 8–23)
BUN BLD-MCNC: 55 MG/DL (ref 8–23)
BUN BLD-MCNC: 56 MG/DL (ref 8–23)
BUN BLD-MCNC: 59 MG/DL (ref 8–23)
BUN BLD-MCNC: 60 MG/DL (ref 8–23)
BUN BLD-MCNC: 62 MG/DL (ref 8–23)
BUN BLD-MCNC: 65 MG/DL (ref 8–23)
BUN BLD-MCNC: 65 MG/DL (ref 8–23)
BUN BLD-MCNC: 66 MG/DL (ref 8–23)
BUN BLD-MCNC: 69 MG/DL (ref 8–23)
BUN BLD-MCNC: 69 MG/DL (ref 8–23)
BUN BLD-MCNC: 70 MG/DL (ref 8–23)
BUN/CREAT SERPL: 16 (ref 7–25)
BUN/CREAT SERPL: 16.5 (ref 7–25)
BUN/CREAT SERPL: 18.1 (ref 7–25)
BUN/CREAT SERPL: 18.9 (ref 7–25)
BUN/CREAT SERPL: 19 (ref 7–25)
BUN/CREAT SERPL: 19.2 (ref 7–25)
BUN/CREAT SERPL: 19.7 (ref 7–25)
BUN/CREAT SERPL: 21.2 (ref 7–25)
BUN/CREAT SERPL: 21.2 (ref 7–25)
BUN/CREAT SERPL: 21.5 (ref 7–25)
BUN/CREAT SERPL: 22.2 (ref 7–25)
BUN/CREAT SERPL: 22.3 (ref 7–25)
BUN/CREAT SERPL: 22.6 (ref 7–25)
BUN/CREAT SERPL: 22.7 (ref 7–25)
BUN/CREAT SERPL: 23.2 (ref 7–25)
BUN/CREAT SERPL: 23.2 (ref 7–25)
BUN/CREAT SERPL: 23.8 (ref 7–25)
BUN/CREAT SERPL: 23.9 (ref 7–25)
BUN/CREAT SERPL: 24 (ref 7–25)
BUN/CREAT SERPL: 24 (ref 7–25)
BUN/CREAT SERPL: 24.2 (ref 7–25)
BUN/CREAT SERPL: 24.4 (ref 7–25)
BUN/CREAT SERPL: 24.6 (ref 7–25)
BUN/CREAT SERPL: 24.6 (ref 7–25)
BUN/CREAT SERPL: 24.7 (ref 7–25)
BUN/CREAT SERPL: 24.8 (ref 7–25)
BUN/CREAT SERPL: 24.9 (ref 7–25)
BUN/CREAT SERPL: 24.9 (ref 7–25)
BUN/CREAT SERPL: 25 (ref 7–25)
BUN/CREAT SERPL: 25.4 (ref 7–25)
BUN/CREAT SERPL: 25.6 (ref 7–25)
BUN/CREAT SERPL: 25.7 (ref 7–25)
BUN/CREAT SERPL: 26.7 (ref 7–25)
BUN/CREAT SERPL: 26.9 (ref 7–25)
BUN/CREAT SERPL: 27.8 (ref 7–25)
BUN/CREAT SERPL: 28.3 (ref 7–25)
BUN/CREAT SERPL: 28.6 (ref 7–25)
BUN/CREAT SERPL: 28.8 (ref 7–25)
BUN/CREAT SERPL: 29.1 (ref 7–25)
BUN/CREAT SERPL: 30.1 (ref 7–25)
CA-I BLD-MCNC: 4.7 MG/DL (ref 4.6–5.4)
CA-I BLD-MCNC: 4.8 MG/DL (ref 4.6–5.4)
CA-I SERPL ISE-MCNC: 1.1 MMOL/L (ref 1.2–1.3)
CA-I SERPL ISE-MCNC: 1.12 MMOL/L (ref 1.2–1.3)
CA-I SERPL ISE-MCNC: 1.14 MMOL/L (ref 1.2–1.3)
CA-I SERPL ISE-MCNC: 1.17 MMOL/L (ref 1.15–1.35)
CA-I SERPL ISE-MCNC: 1.21 MMOL/L (ref 1.15–1.35)
CALCIUM SPEC-SCNC: 7.3 MG/DL (ref 8.6–10.5)
CALCIUM SPEC-SCNC: 7.5 MG/DL (ref 8.6–10.5)
CALCIUM SPEC-SCNC: 7.7 MG/DL (ref 8.6–10.5)
CALCIUM SPEC-SCNC: 7.8 MG/DL (ref 8.6–10.5)
CALCIUM SPEC-SCNC: 7.9 MG/DL (ref 8.6–10.5)
CALCIUM SPEC-SCNC: 8 MG/DL (ref 8.6–10.5)
CALCIUM SPEC-SCNC: 8.1 MG/DL (ref 8.6–10.5)
CALCIUM SPEC-SCNC: 8.2 MG/DL (ref 8.6–10.5)
CALCIUM SPEC-SCNC: 8.3 MG/DL (ref 8.6–10.5)
CALCIUM SPEC-SCNC: 8.4 MG/DL (ref 8.6–10.5)
CALCIUM SPEC-SCNC: 8.5 MG/DL (ref 8.6–10.5)
CALCIUM SPEC-SCNC: 8.6 MG/DL (ref 8.6–10.5)
CALCIUM SPEC-SCNC: 8.7 MG/DL (ref 8.6–10.5)
CALCIUM SPEC-SCNC: 8.8 MG/DL (ref 8.6–10.5)
CALCIUM SPEC-SCNC: 8.9 MG/DL (ref 8.6–10.5)
CALCIUM SPEC-SCNC: 9.6 MG/DL (ref 8.6–10.5)
CARDIOLIPIN IGA SER IA-ACNC: 9 APL U/ML (ref 0–11)
CARDIOLIPIN IGG SER IA-ACNC: 9 GPL U/ML (ref 0–14)
CARDIOLIPIN IGM SER IA-ACNC: >150 MPL U/ML (ref 0–12)
CHLORIDE SERPL-SCNC: 100 MMOL/L (ref 98–107)
CHLORIDE SERPL-SCNC: 103 MMOL/L (ref 98–107)
CHLORIDE SERPL-SCNC: 85 MMOL/L (ref 98–107)
CHLORIDE SERPL-SCNC: 89 MMOL/L (ref 98–107)
CHLORIDE SERPL-SCNC: 90 MMOL/L (ref 98–107)
CHLORIDE SERPL-SCNC: 91 MMOL/L (ref 98–107)
CHLORIDE SERPL-SCNC: 92 MMOL/L (ref 98–107)
CHLORIDE SERPL-SCNC: 93 MMOL/L (ref 98–107)
CHLORIDE SERPL-SCNC: 94 MMOL/L (ref 98–107)
CHLORIDE SERPL-SCNC: 95 MMOL/L (ref 98–107)
CHLORIDE SERPL-SCNC: 96 MMOL/L (ref 98–107)
CHLORIDE SERPL-SCNC: 97 MMOL/L (ref 98–107)
CK SERPL-CCNC: 13 U/L (ref 20–180)
CK SERPL-CCNC: 48 U/L (ref 20–180)
CLARITY UR: ABNORMAL
CO2 SERPL-SCNC: 18.6 MMOL/L (ref 22–29)
CO2 SERPL-SCNC: 20.7 MMOL/L (ref 22–29)
CO2 SERPL-SCNC: 21.4 MMOL/L (ref 22–29)
CO2 SERPL-SCNC: 21.4 MMOL/L (ref 22–29)
CO2 SERPL-SCNC: 21.5 MMOL/L (ref 22–29)
CO2 SERPL-SCNC: 21.8 MMOL/L (ref 22–29)
CO2 SERPL-SCNC: 21.9 MMOL/L (ref 22–29)
CO2 SERPL-SCNC: 22 MMOL/L (ref 22–29)
CO2 SERPL-SCNC: 22.6 MMOL/L (ref 22–29)
CO2 SERPL-SCNC: 22.8 MMOL/L (ref 22–29)
CO2 SERPL-SCNC: 23 MMOL/L (ref 22–29)
CO2 SERPL-SCNC: 23.8 MMOL/L (ref 22–29)
CO2 SERPL-SCNC: 24 MMOL/L (ref 22–29)
CO2 SERPL-SCNC: 24.6 MMOL/L (ref 22–29)
CO2 SERPL-SCNC: 25 MMOL/L (ref 22–29)
CO2 SERPL-SCNC: 25.4 MMOL/L (ref 22–29)
CO2 SERPL-SCNC: 25.7 MMOL/L (ref 22–29)
CO2 SERPL-SCNC: 26 MMOL/L (ref 22–29)
CO2 SERPL-SCNC: 27 MMOL/L (ref 22–29)
CO2 SERPL-SCNC: 28 MMOL/L (ref 22–29)
CO2 SERPL-SCNC: 28 MMOL/L (ref 22–29)
CO2 SERPL-SCNC: 29 MMOL/L (ref 22–29)
CO2 SERPL-SCNC: 32 MMOL/L (ref 22–29)
CO2 SERPL-SCNC: 33.9 MMOL/L (ref 22–29)
COLOR FLD: YELLOW
COLOR UR: YELLOW
CREAT BLD-MCNC: 0.9 MG/DL (ref 0.57–1)
CREAT BLD-MCNC: 1.3 MG/DL (ref 0.57–1)
CREAT BLD-MCNC: 1.32 MG/DL (ref 0.57–1)
CREAT BLD-MCNC: 1.33 MG/DL (ref 0.57–1)
CREAT BLD-MCNC: 1.34 MG/DL (ref 0.57–1)
CREAT BLD-MCNC: 1.4 MG/DL (ref 0.57–1)
CREAT BLD-MCNC: 1.42 MG/DL (ref 0.57–1)
CREAT BLD-MCNC: 1.44 MG/DL (ref 0.57–1)
CREAT BLD-MCNC: 1.47 MG/DL (ref 0.57–1)
CREAT BLD-MCNC: 1.58 MG/DL (ref 0.57–1)
CREAT BLD-MCNC: 1.66 MG/DL (ref 0.57–1)
CREAT BLD-MCNC: 1.69 MG/DL (ref 0.57–1)
CREAT BLD-MCNC: 1.89 MG/DL (ref 0.57–1)
CREAT BLD-MCNC: 1.91 MG/DL (ref 0.57–1)
CREAT BLD-MCNC: 1.97 MG/DL (ref 0.57–1)
CREAT BLD-MCNC: 1.99 MG/DL (ref 0.57–1)
CREAT BLD-MCNC: 2.01 MG/DL (ref 0.57–1)
CREAT BLD-MCNC: 2.02 MG/DL (ref 0.57–1)
CREAT BLD-MCNC: 2.03 MG/DL (ref 0.57–1)
CREAT BLD-MCNC: 2.03 MG/DL (ref 0.57–1)
CREAT BLD-MCNC: 2.04 MG/DL (ref 0.57–1)
CREAT BLD-MCNC: 2.21 MG/DL (ref 0.57–1)
CREAT BLD-MCNC: 2.31 MG/DL (ref 0.57–1)
CREAT BLD-MCNC: 2.37 MG/DL (ref 0.57–1)
CREAT BLD-MCNC: 2.43 MG/DL (ref 0.57–1)
CREAT BLD-MCNC: 2.45 MG/DL (ref 0.57–1)
CREAT BLD-MCNC: 2.51 MG/DL (ref 0.57–1)
CREAT BLD-MCNC: 2.57 MG/DL (ref 0.57–1)
CREAT BLD-MCNC: 2.6 MG/DL (ref 0.57–1)
CREAT BLD-MCNC: 2.6 MG/DL (ref 0.57–1)
CREAT BLD-MCNC: 2.64 MG/DL (ref 0.57–1)
CREAT BLD-MCNC: 2.66 MG/DL (ref 0.57–1)
CREAT BLD-MCNC: 2.69 MG/DL (ref 0.57–1)
CREAT BLD-MCNC: 2.7 MG/DL (ref 0.57–1)
CREAT BLD-MCNC: 2.7 MG/DL (ref 0.57–1)
CREAT BLD-MCNC: 2.74 MG/DL (ref 0.57–1)
CREAT BLD-MCNC: 2.84 MG/DL (ref 0.57–1)
CREAT BLD-MCNC: 2.86 MG/DL (ref 0.57–1)
CREAT BLD-MCNC: 2.87 MG/DL (ref 0.57–1)
CREAT BLD-MCNC: 2.87 MG/DL (ref 0.57–1)
CROSSMATCH INTERPRETATION: NORMAL
CYTO UR: NORMAL
D DIMER PPP FEU-MCNC: 7.82 MCGFEU/ML (ref 0.17–0.59)
D-LACTATE SERPL-SCNC: 1 MMOL/L (ref 0.5–2)
DEPRECATED RDW RBC AUTO: 42.3 FL (ref 37–54)
DEPRECATED RDW RBC AUTO: 43.3 FL (ref 37–54)
DEPRECATED RDW RBC AUTO: 44.6 FL (ref 37–54)
DEPRECATED RDW RBC AUTO: 45.5 FL (ref 37–54)
DEPRECATED RDW RBC AUTO: 45.6 FL (ref 37–54)
DEPRECATED RDW RBC AUTO: 45.6 FL (ref 37–54)
DEPRECATED RDW RBC AUTO: 46.4 FL (ref 37–54)
DEPRECATED RDW RBC AUTO: 46.7 FL (ref 37–54)
DEPRECATED RDW RBC AUTO: 47.1 FL (ref 37–54)
DEPRECATED RDW RBC AUTO: 47.3 FL (ref 37–54)
DEPRECATED RDW RBC AUTO: 47.7 FL (ref 37–54)
DEPRECATED RDW RBC AUTO: 48.1 FL (ref 37–54)
DEPRECATED RDW RBC AUTO: 48.3 FL (ref 37–54)
DEPRECATED RDW RBC AUTO: 48.6 FL (ref 37–54)
DEPRECATED RDW RBC AUTO: 48.7 FL (ref 37–54)
DEPRECATED RDW RBC AUTO: 49 FL (ref 37–54)
DEPRECATED RDW RBC AUTO: 49 FL (ref 37–54)
DEPRECATED RDW RBC AUTO: 49.4 FL (ref 37–54)
DEPRECATED RDW RBC AUTO: 50.3 FL (ref 37–54)
DEPRECATED RDW RBC AUTO: 50.3 FL (ref 37–54)
DEPRECATED RDW RBC AUTO: 50.5 FL (ref 37–54)
DEPRECATED RDW RBC AUTO: 50.7 FL (ref 37–54)
DEPRECATED RDW RBC AUTO: 50.8 FL (ref 37–54)
DEPRECATED RDW RBC AUTO: 50.8 FL (ref 37–54)
DEPRECATED RDW RBC AUTO: 51.6 FL (ref 37–54)
DEPRECATED RDW RBC AUTO: 51.6 FL (ref 37–54)
DEPRECATED RDW RBC AUTO: 51.8 FL (ref 37–54)
DEPRECATED RDW RBC AUTO: 52 FL (ref 37–54)
DEPRECATED RDW RBC AUTO: 52.9 FL (ref 37–54)
DOHLE BODIES: PRESENT
DRVVT IMM 1:2 NP PPP: 66.8 SEC (ref 0–47)
EOSINOPHIL # BLD AUTO: 0 10*3/MM3 (ref 0–0.4)
EOSINOPHIL # BLD AUTO: 0.01 10*3/MM3 (ref 0–0.4)
EOSINOPHIL # BLD AUTO: 0.01 10*3/MM3 (ref 0–0.4)
EOSINOPHIL # BLD AUTO: 0.03 10*3/MM3 (ref 0–0.4)
EOSINOPHIL # BLD AUTO: 0.04 10*3/MM3 (ref 0–0.4)
EOSINOPHIL # BLD AUTO: 0.05 10*3/MM3 (ref 0–0.4)
EOSINOPHIL # BLD AUTO: 0.08 10*3/MM3 (ref 0–0.4)
EOSINOPHIL # BLD AUTO: 0.1 10*3/MM3 (ref 0–0.4)
EOSINOPHIL # BLD AUTO: 0.1 10*3/MM3 (ref 0–0.4)
EOSINOPHIL # BLD AUTO: 0.11 10*3/MM3 (ref 0–0.4)
EOSINOPHIL # BLD MANUAL: 0.08 10*3/MM3 (ref 0–0.4)
EOSINOPHIL # BLD MANUAL: 0.09 10*3/MM3 (ref 0–0.4)
EOSINOPHIL # BLD MANUAL: 0.12 10*3/MM3 (ref 0–0.4)
EOSINOPHIL NFR BLD AUTO: 0 % (ref 0.3–6.2)
EOSINOPHIL NFR BLD AUTO: 0.1 % (ref 0.3–6.2)
EOSINOPHIL NFR BLD AUTO: 0.2 % (ref 0.3–6.2)
EOSINOPHIL NFR BLD AUTO: 0.3 % (ref 0.3–6.2)
EOSINOPHIL NFR BLD AUTO: 0.4 % (ref 0.3–6.2)
EOSINOPHIL NFR BLD AUTO: 0.4 % (ref 0.3–6.2)
EOSINOPHIL NFR BLD AUTO: 0.5 % (ref 0.3–6.2)
EOSINOPHIL NFR BLD AUTO: 0.5 % (ref 0.3–6.2)
EOSINOPHIL NFR BLD AUTO: 0.6 % (ref 0.3–6.2)
EOSINOPHIL NFR BLD AUTO: 0.6 % (ref 0.3–6.2)
EOSINOPHIL NFR BLD AUTO: 0.7 % (ref 0.3–6.2)
EOSINOPHIL NFR BLD AUTO: 0.7 % (ref 0.3–6.2)
EOSINOPHIL NFR BLD AUTO: 0.8 % (ref 0.3–6.2)
EOSINOPHIL NFR BLD AUTO: 0.9 % (ref 0.3–6.2)
EOSINOPHIL NFR BLD AUTO: 1.4 % (ref 0.3–6.2)
EOSINOPHIL NFR BLD AUTO: 1.5 % (ref 0.3–6.2)
EOSINOPHIL NFR BLD MANUAL: 1 % (ref 0.3–6.2)
EOSINOPHIL NFR BLD MANUAL: 1 % (ref 0.3–6.2)
EOSINOPHIL NFR BLD MANUAL: 2 % (ref 0.3–6.2)
EOSINOPHIL SPEC QL MICRO: 0 % EOS/100 CELLS (ref 0–0)
EOSINOPHIL SPEC QL MICRO: 0 % EOS/100 CELLS (ref 0–0)
ERYTHROCYTE [DISTWIDTH] IN BLOOD BY AUTOMATED COUNT: 13.8 % (ref 12.3–15.4)
ERYTHROCYTE [DISTWIDTH] IN BLOOD BY AUTOMATED COUNT: 13.9 % (ref 12.3–15.4)
ERYTHROCYTE [DISTWIDTH] IN BLOOD BY AUTOMATED COUNT: 14.2 % (ref 12.3–15.4)
ERYTHROCYTE [DISTWIDTH] IN BLOOD BY AUTOMATED COUNT: 14.3 % (ref 12.3–15.4)
ERYTHROCYTE [DISTWIDTH] IN BLOOD BY AUTOMATED COUNT: 14.3 % (ref 12.3–15.4)
ERYTHROCYTE [DISTWIDTH] IN BLOOD BY AUTOMATED COUNT: 14.5 % (ref 12.3–15.4)
ERYTHROCYTE [DISTWIDTH] IN BLOOD BY AUTOMATED COUNT: 14.6 % (ref 12.3–15.4)
ERYTHROCYTE [DISTWIDTH] IN BLOOD BY AUTOMATED COUNT: 14.8 % (ref 12.3–15.4)
ERYTHROCYTE [DISTWIDTH] IN BLOOD BY AUTOMATED COUNT: 15 % (ref 12.3–15.4)
ERYTHROCYTE [DISTWIDTH] IN BLOOD BY AUTOMATED COUNT: 15.1 % (ref 12.3–15.4)
ERYTHROCYTE [DISTWIDTH] IN BLOOD BY AUTOMATED COUNT: 15.2 % (ref 12.3–15.4)
ERYTHROCYTE [DISTWIDTH] IN BLOOD BY AUTOMATED COUNT: 15.3 % (ref 12.3–15.4)
ERYTHROCYTE [DISTWIDTH] IN BLOOD BY AUTOMATED COUNT: 15.3 % (ref 12.3–15.4)
ERYTHROCYTE [DISTWIDTH] IN BLOOD BY AUTOMATED COUNT: 15.4 % (ref 12.3–15.4)
ERYTHROCYTE [DISTWIDTH] IN BLOOD BY AUTOMATED COUNT: 15.5 % (ref 12.3–15.4)
ERYTHROCYTE [DISTWIDTH] IN BLOOD BY AUTOMATED COUNT: 15.5 % (ref 12.3–15.4)
ERYTHROCYTE [DISTWIDTH] IN BLOOD BY AUTOMATED COUNT: 15.6 % (ref 12.3–15.4)
ERYTHROCYTE [DISTWIDTH] IN BLOOD BY AUTOMATED COUNT: 15.7 % (ref 12.3–15.4)
ERYTHROCYTE [DISTWIDTH] IN BLOOD BY AUTOMATED COUNT: 15.7 % (ref 12.3–15.4)
ERYTHROCYTE [DISTWIDTH] IN BLOOD BY AUTOMATED COUNT: 15.8 % (ref 12.3–15.4)
ERYTHROCYTE [DISTWIDTH] IN BLOOD BY AUTOMATED COUNT: 15.8 % (ref 12.3–15.4)
ERYTHROCYTE [DISTWIDTH] IN BLOOD BY AUTOMATED COUNT: 15.9 % (ref 12.3–15.4)
ERYTHROCYTE [DISTWIDTH] IN BLOOD BY AUTOMATED COUNT: 16 % (ref 12.3–15.4)
ERYTHROCYTE [DISTWIDTH] IN BLOOD BY AUTOMATED COUNT: 16.1 % (ref 12.3–15.4)
ERYTHROCYTE [DISTWIDTH] IN BLOOD BY AUTOMATED COUNT: 16.2 % (ref 12.3–15.4)
ERYTHROCYTE [DISTWIDTH] IN BLOOD BY AUTOMATED COUNT: 16.4 % (ref 12.3–15.4)
ERYTHROCYTE [DISTWIDTH] IN BLOOD BY AUTOMATED COUNT: 16.4 % (ref 12.3–15.4)
ERYTHROCYTE [DISTWIDTH] IN BLOOD BY AUTOMATED COUNT: 16.5 % (ref 12.3–15.4)
ERYTHROCYTE [DISTWIDTH] IN BLOOD BY AUTOMATED COUNT: 16.7 % (ref 12.3–15.4)
ERYTHROCYTE [SEDIMENTATION RATE] IN BLOOD: 61 MM/HR (ref 0–30)
F5 GENE MUT ANL BLD/T: NORMAL
FACTOR II, DNA ANALYSIS: NORMAL
FERRITIN SERPL-MCNC: 1242 NG/ML (ref 13–150)
FIBRINOGEN PPP-MCNC: 221 MG/DL (ref 219–464)
FOLATE SERPL-MCNC: 8.3 NG/ML (ref 4.78–24.2)
GAMMA GLOB SERPL ELPH-MCNC: 1.1 G/DL (ref 0.4–1.8)
GFR SERPL CREATININE-BSD FRML MDRD: 16 ML/MIN/1.73
GFR SERPL CREATININE-BSD FRML MDRD: 17 ML/MIN/1.73
GFR SERPL CREATININE-BSD FRML MDRD: 18 ML/MIN/1.73
GFR SERPL CREATININE-BSD FRML MDRD: 19 ML/MIN/1.73
GFR SERPL CREATININE-BSD FRML MDRD: 19 ML/MIN/1.73
GFR SERPL CREATININE-BSD FRML MDRD: 20 ML/MIN/1.73
GFR SERPL CREATININE-BSD FRML MDRD: 20 ML/MIN/1.73
GFR SERPL CREATININE-BSD FRML MDRD: 21 ML/MIN/1.73
GFR SERPL CREATININE-BSD FRML MDRD: 22 ML/MIN/1.73
GFR SERPL CREATININE-BSD FRML MDRD: 24 ML/MIN/1.73
GFR SERPL CREATININE-BSD FRML MDRD: 25 ML/MIN/1.73
GFR SERPL CREATININE-BSD FRML MDRD: 25 ML/MIN/1.73
GFR SERPL CREATININE-BSD FRML MDRD: 26 ML/MIN/1.73
GFR SERPL CREATININE-BSD FRML MDRD: 26 ML/MIN/1.73
GFR SERPL CREATININE-BSD FRML MDRD: 30 ML/MIN/1.73
GFR SERPL CREATININE-BSD FRML MDRD: 31 ML/MIN/1.73
GFR SERPL CREATININE-BSD FRML MDRD: 32 ML/MIN/1.73
GFR SERPL CREATININE-BSD FRML MDRD: 35 ML/MIN/1.73
GFR SERPL CREATININE-BSD FRML MDRD: 36 ML/MIN/1.73
GFR SERPL CREATININE-BSD FRML MDRD: 37 ML/MIN/1.73
GFR SERPL CREATININE-BSD FRML MDRD: 37 ML/MIN/1.73
GFR SERPL CREATININE-BSD FRML MDRD: 39 ML/MIN/1.73
GFR SERPL CREATININE-BSD FRML MDRD: 40 ML/MIN/1.73
GFR SERPL CREATININE-BSD FRML MDRD: 40 ML/MIN/1.73
GFR SERPL CREATININE-BSD FRML MDRD: 41 ML/MIN/1.73
GFR SERPL CREATININE-BSD FRML MDRD: 62 ML/MIN/1.73
GIE STN SPEC: NORMAL
GLOBULIN SER CALC-MCNC: 3.1 G/DL (ref 2.2–3.9)
GLOBULIN UR ELPH-MCNC: 1.8 GM/DL
GLOBULIN UR ELPH-MCNC: 2 GM/DL
GLOBULIN UR ELPH-MCNC: 2.2 GM/DL
GLOBULIN UR ELPH-MCNC: 2.4 GM/DL
GLOBULIN UR ELPH-MCNC: 2.4 GM/DL
GLOBULIN UR ELPH-MCNC: 2.6 GM/DL
GLOBULIN UR ELPH-MCNC: 2.7 GM/DL
GLOBULIN UR ELPH-MCNC: 2.8 GM/DL
GLOBULIN UR ELPH-MCNC: 2.8 GM/DL
GLOBULIN UR ELPH-MCNC: 2.9 GM/DL
GLOBULIN UR ELPH-MCNC: 2.9 GM/DL
GLOBULIN UR ELPH-MCNC: 3 GM/DL
GLOBULIN UR ELPH-MCNC: 3.1 GM/DL
GLOBULIN UR ELPH-MCNC: 3.3 GM/DL
GLOBULIN UR ELPH-MCNC: 3.4 GM/DL
GLUCOSE BLD-MCNC: 100 MG/DL (ref 65–99)
GLUCOSE BLD-MCNC: 101 MG/DL (ref 65–99)
GLUCOSE BLD-MCNC: 101 MG/DL (ref 65–99)
GLUCOSE BLD-MCNC: 102 MG/DL (ref 65–99)
GLUCOSE BLD-MCNC: 104 MG/DL (ref 65–99)
GLUCOSE BLD-MCNC: 106 MG/DL (ref 65–99)
GLUCOSE BLD-MCNC: 106 MG/DL (ref 65–99)
GLUCOSE BLD-MCNC: 107 MG/DL (ref 65–99)
GLUCOSE BLD-MCNC: 108 MG/DL (ref 65–99)
GLUCOSE BLD-MCNC: 111 MG/DL (ref 65–99)
GLUCOSE BLD-MCNC: 111 MG/DL (ref 65–99)
GLUCOSE BLD-MCNC: 112 MG/DL (ref 65–99)
GLUCOSE BLD-MCNC: 116 MG/DL (ref 65–99)
GLUCOSE BLD-MCNC: 118 MG/DL (ref 65–99)
GLUCOSE BLD-MCNC: 120 MG/DL (ref 65–99)
GLUCOSE BLD-MCNC: 122 MG/DL (ref 65–99)
GLUCOSE BLD-MCNC: 126 MG/DL (ref 65–99)
GLUCOSE BLD-MCNC: 127 MG/DL (ref 65–99)
GLUCOSE BLD-MCNC: 131 MG/DL (ref 65–99)
GLUCOSE BLD-MCNC: 135 MG/DL (ref 65–99)
GLUCOSE BLD-MCNC: 146 MG/DL (ref 65–99)
GLUCOSE BLD-MCNC: 77 MG/DL (ref 65–99)
GLUCOSE BLD-MCNC: 79 MG/DL (ref 65–99)
GLUCOSE BLD-MCNC: 85 MG/DL (ref 65–99)
GLUCOSE BLD-MCNC: 85 MG/DL (ref 65–99)
GLUCOSE BLD-MCNC: 87 MG/DL (ref 65–99)
GLUCOSE BLD-MCNC: 88 MG/DL (ref 65–99)
GLUCOSE BLD-MCNC: 88 MG/DL (ref 65–99)
GLUCOSE BLD-MCNC: 89 MG/DL (ref 65–99)
GLUCOSE BLD-MCNC: 90 MG/DL (ref 65–99)
GLUCOSE BLD-MCNC: 92 MG/DL (ref 65–99)
GLUCOSE BLD-MCNC: 93 MG/DL (ref 65–99)
GLUCOSE BLD-MCNC: 93 MG/DL (ref 65–99)
GLUCOSE BLD-MCNC: 94 MG/DL (ref 65–99)
GLUCOSE BLD-MCNC: 95 MG/DL (ref 65–99)
GLUCOSE BLD-MCNC: 96 MG/DL (ref 65–99)
GLUCOSE BLD-MCNC: 97 MG/DL (ref 65–99)
GLUCOSE BLD-MCNC: 99 MG/DL (ref 65–99)
GLUCOSE BLDC GLUCOMTR-MCNC: 102 MG/DL (ref 70–105)
GLUCOSE BLDC GLUCOMTR-MCNC: 103 MG/DL (ref 70–130)
GLUCOSE BLDC GLUCOMTR-MCNC: 109 MG/DL (ref 70–130)
GLUCOSE BLDC GLUCOMTR-MCNC: 119 MG/DL (ref 70–105)
GLUCOSE BLDC GLUCOMTR-MCNC: 125 MG/DL (ref 70–105)
GLUCOSE BLDC GLUCOMTR-MCNC: 125 MG/DL (ref 70–130)
GLUCOSE BLDC GLUCOMTR-MCNC: 126 MG/DL (ref 70–130)
GLUCOSE BLDC GLUCOMTR-MCNC: 22 MG/DL (ref 70–105)
GLUCOSE BLDC GLUCOMTR-MCNC: 37 MG/DL (ref 70–105)
GLUCOSE BLDC GLUCOMTR-MCNC: 78 MG/DL (ref 70–130)
GLUCOSE BLDC GLUCOMTR-MCNC: 89 MG/DL (ref 70–130)
GLUCOSE FLD-MCNC: 101 MG/DL
GLUCOSE FLD-MCNC: 114 MG/DL
GLUCOSE UR STRIP-MCNC: NEGATIVE MG/DL
GRAM STN SPEC: NORMAL
HAPTOGLOB SERPL-MCNC: 273 MG/DL (ref 30–200)
HCT VFR BLD AUTO: 17.9 % (ref 34–46.6)
HCT VFR BLD AUTO: 18.5 % (ref 34–46.6)
HCT VFR BLD AUTO: 20.1 % (ref 34–46.6)
HCT VFR BLD AUTO: 21.7 % (ref 34–46.6)
HCT VFR BLD AUTO: 22 % (ref 34–46.6)
HCT VFR BLD AUTO: 22.8 % (ref 34–46.6)
HCT VFR BLD AUTO: 23.8 % (ref 34–46.6)
HCT VFR BLD AUTO: 23.8 % (ref 34–46.6)
HCT VFR BLD AUTO: 24 % (ref 34–46.6)
HCT VFR BLD AUTO: 24.1 % (ref 34–46.6)
HCT VFR BLD AUTO: 24.3 % (ref 34–46.6)
HCT VFR BLD AUTO: 25.2 % (ref 34–46.6)
HCT VFR BLD AUTO: 25.2 % (ref 34–46.6)
HCT VFR BLD AUTO: 25.3 % (ref 34–46.6)
HCT VFR BLD AUTO: 26 % (ref 34–46.6)
HCT VFR BLD AUTO: 26.3 % (ref 34–46.6)
HCT VFR BLD AUTO: 26.4 % (ref 34–46.6)
HCT VFR BLD AUTO: 26.6 % (ref 34–46.6)
HCT VFR BLD AUTO: 26.8 % (ref 34–46.6)
HCT VFR BLD AUTO: 27 % (ref 34–46.6)
HCT VFR BLD AUTO: 27 % (ref 34–46.6)
HCT VFR BLD AUTO: 27.1 % (ref 34–46.6)
HCT VFR BLD AUTO: 27.1 % (ref 34–46.6)
HCT VFR BLD AUTO: 27.3 % (ref 34–46.6)
HCT VFR BLD AUTO: 27.8 % (ref 34–46.6)
HCT VFR BLD AUTO: 28.1 % (ref 34–46.6)
HCT VFR BLD AUTO: 28.4 % (ref 34–46.6)
HCT VFR BLD AUTO: 28.4 % (ref 34–46.6)
HCT VFR BLD AUTO: 28.7 % (ref 34–46.6)
HCT VFR BLD AUTO: 28.8 % (ref 34–46.6)
HCT VFR BLD AUTO: 28.8 % (ref 34–46.6)
HCT VFR BLD AUTO: 29.2 % (ref 34–46.6)
HCT VFR BLD AUTO: 29.2 % (ref 34–46.6)
HCT VFR BLD AUTO: 29.3 % (ref 34–46.6)
HCT VFR BLD AUTO: 29.5 % (ref 34–46.6)
HCT VFR BLD AUTO: 30.8 % (ref 34–46.6)
HCT VFR BLD AUTO: 31.4 % (ref 34–46.6)
HCT VFR BLD AUTO: 31.7 % (ref 34–46.6)
HCT VFR BLD AUTO: 31.7 % (ref 34–46.6)
HCT VFR BLD AUTO: 31.9 % (ref 34–46.6)
HCT VFR BLD AUTO: 32.2 % (ref 34–46.6)
HCT VFR BLD AUTO: 32.5 % (ref 34–46.6)
HCT VFR BLD AUTO: 33.1 % (ref 34–46.6)
HCT VFR BLD AUTO: 34.2 % (ref 34–46.6)
HCT VFR BLD AUTO: 35 % (ref 34–46.6)
HCT VFR BLD AUTO: 35.1 % (ref 34–46.6)
HCT VFR BLD AUTO: 36.5 % (ref 34–46.6)
HEMOCCULT STL QL IA: POSITIVE
HGB BLD-MCNC: 10.2 G/DL (ref 12–15.9)
HGB BLD-MCNC: 10.4 G/DL (ref 12–15.9)
HGB BLD-MCNC: 10.5 G/DL (ref 12–15.9)
HGB BLD-MCNC: 10.6 G/DL (ref 12–15.9)
HGB BLD-MCNC: 10.9 G/DL (ref 12–15.9)
HGB BLD-MCNC: 11.2 G/DL (ref 12–15.9)
HGB BLD-MCNC: 11.5 G/DL (ref 12–15.9)
HGB BLD-MCNC: 11.6 G/DL (ref 12–15.9)
HGB BLD-MCNC: 11.7 G/DL (ref 12–15.9)
HGB BLD-MCNC: 11.7 G/DL (ref 12–15.9)
HGB BLD-MCNC: 11.8 G/DL (ref 12–15.9)
HGB BLD-MCNC: 12.7 G/DL (ref 12–15.9)
HGB BLD-MCNC: 5.8 G/DL (ref 12–15.9)
HGB BLD-MCNC: 6.8 G/DL (ref 12–15.9)
HGB BLD-MCNC: 6.9 G/DL (ref 12–15.9)
HGB BLD-MCNC: 7.2 G/DL (ref 12–15.9)
HGB BLD-MCNC: 7.4 G/DL (ref 12–15.9)
HGB BLD-MCNC: 7.7 G/DL (ref 12–15.9)
HGB BLD-MCNC: 7.7 G/DL (ref 12–15.9)
HGB BLD-MCNC: 7.9 G/DL (ref 12–15.9)
HGB BLD-MCNC: 8 G/DL (ref 12–15.9)
HGB BLD-MCNC: 8 G/DL (ref 12–15.9)
HGB BLD-MCNC: 8.1 G/DL (ref 12–15.9)
HGB BLD-MCNC: 8.5 G/DL (ref 12–15.9)
HGB BLD-MCNC: 8.6 G/DL (ref 12–15.9)
HGB BLD-MCNC: 8.7 G/DL (ref 12–15.9)
HGB BLD-MCNC: 8.7 G/DL (ref 12–15.9)
HGB BLD-MCNC: 8.8 G/DL (ref 12–15.9)
HGB BLD-MCNC: 8.9 G/DL (ref 12–15.9)
HGB BLD-MCNC: 8.9 G/DL (ref 12–15.9)
HGB BLD-MCNC: 9.1 G/DL (ref 12–15.9)
HGB BLD-MCNC: 9.2 G/DL (ref 12–15.9)
HGB BLD-MCNC: 9.3 G/DL (ref 12–15.9)
HGB BLD-MCNC: 9.3 G/DL (ref 12–15.9)
HGB BLD-MCNC: 9.4 G/DL (ref 12–15.9)
HGB BLD-MCNC: 9.4 G/DL (ref 12–15.9)
HGB BLD-MCNC: 9.6 G/DL (ref 12–15.9)
HGB BLD-MCNC: 9.7 G/DL (ref 12–15.9)
HGB BLD-MCNC: 9.9 G/DL (ref 12–15.9)
HGB UR QL STRIP.AUTO: ABNORMAL
HOLD SPECIMEN: NORMAL
HOLD SPECIMEN: NORMAL
HYALINE CASTS UR QL AUTO: ABNORMAL /LPF
IMM GRANULOCYTES # BLD AUTO: 0.01 10*3/MM3 (ref 0–0.05)
IMM GRANULOCYTES # BLD AUTO: 0.03 10*3/MM3 (ref 0–0.05)
IMM GRANULOCYTES # BLD AUTO: 0.04 10*3/MM3 (ref 0–0.05)
IMM GRANULOCYTES # BLD AUTO: 0.05 10*3/MM3 (ref 0–0.05)
IMM GRANULOCYTES # BLD AUTO: 0.05 10*3/MM3 (ref 0–0.05)
IMM GRANULOCYTES # BLD AUTO: 0.06 10*3/MM3 (ref 0–0.05)
IMM GRANULOCYTES # BLD AUTO: 0.12 10*3/MM3 (ref 0–0.05)
IMM GRANULOCYTES NFR BLD AUTO: 0.3 % (ref 0–0.5)
IMM GRANULOCYTES NFR BLD AUTO: 0.5 % (ref 0–0.5)
IMM GRANULOCYTES NFR BLD AUTO: 0.6 % (ref 0–0.5)
IMM GRANULOCYTES NFR BLD AUTO: 0.7 % (ref 0–0.5)
IMM GRANULOCYTES NFR BLD AUTO: 0.8 % (ref 0–0.5)
IMM GRANULOCYTES NFR BLD AUTO: 0.9 % (ref 0–0.5)
IMM GRANULOCYTES NFR BLD AUTO: 1.1 % (ref 0–0.5)
IMM GRANULOCYTES NFR BLD AUTO: 1.1 % (ref 0–0.5)
IMM GRANULOCYTES NFR BLD AUTO: 1.5 % (ref 0–0.5)
INR PPP: 1.18 (ref 0.9–1.1)
INR PPP: 1.21 (ref 0.9–1.1)
INR PPP: 1.22 (ref 0.9–1.1)
INR PPP: 1.25 (ref 0.9–1.1)
INR PPP: 1.27 (ref 0.9–1.1)
INR PPP: 1.33 (ref 0.9–1.1)
INR PPP: 1.33 (ref 0.9–1.1)
INR PPP: 1.39 (ref 0.9–1.1)
INR PPP: 1.42 (ref 0.9–1.1)
IRON 24H UR-MRATE: 24 MCG/DL (ref 37–145)
IRON 24H UR-MRATE: 54 MCG/DL (ref 37–145)
IRON 24H UR-MRATE: 62 MCG/DL (ref 37–145)
IRON SATN MFR SERPL: 16 % (ref 20–50)
KETONES UR QL STRIP: ABNORMAL
KETONES UR QL STRIP: NEGATIVE
LA NT DPL PPP: 97.8 SEC (ref 0–55)
LA NT DPL/LA NT HPL PPP-RTO: 1.51 RATIO (ref 0–1.4)
LA NT PLATELET PPP: 61.6 SEC (ref 0–51.9)
LAB AP CASE REPORT: NORMAL
LAB AP CLINICAL INFORMATION: NORMAL
LAB AP CLINICAL INFORMATION: NORMAL
LAB AP DIAGNOSIS COMMENT: NORMAL
LAB AP DIAGNOSIS COMMENT: NORMAL
LDH FLD-CCNC: 102 U/L
LDH FLD-CCNC: 109 U/L
LDH SERPL-CCNC: 197 U/L (ref 135–214)
LDH SERPL-CCNC: 212 U/L (ref 135–214)
LDH SERPL-CCNC: 213 U/L (ref 135–214)
LEUKOCYTE ESTERASE UR QL STRIP.AUTO: ABNORMAL
LIPASE SERPL-CCNC: 38 U/L (ref 13–60)
LIPASE SERPL-CCNC: 38 U/L (ref 13–60)
LUPUS ANTICOAGULANT REFLEX: ABNORMAL
LYMPHOCYTES # BLD AUTO: 0.43 10*3/MM3 (ref 0.7–3.1)
LYMPHOCYTES # BLD AUTO: 0.56 10*3/MM3 (ref 0.7–3.1)
LYMPHOCYTES # BLD AUTO: 0.6 10*3/MM3 (ref 0.7–3.1)
LYMPHOCYTES # BLD AUTO: 0.69 10*3/MM3 (ref 0.7–3.1)
LYMPHOCYTES # BLD AUTO: 0.7 10*3/MM3 (ref 0.7–3.1)
LYMPHOCYTES # BLD AUTO: 0.74 10*3/MM3 (ref 0.7–3.1)
LYMPHOCYTES # BLD AUTO: 0.8 10*3/MM3 (ref 0.7–3.1)
LYMPHOCYTES # BLD AUTO: 0.83 10*3/MM3 (ref 0.7–3.1)
LYMPHOCYTES # BLD AUTO: 0.96 10*3/MM3 (ref 0.7–3.1)
LYMPHOCYTES # BLD AUTO: 1 10*3/MM3 (ref 0.7–3.1)
LYMPHOCYTES # BLD AUTO: 1 10*3/MM3 (ref 0.7–3.1)
LYMPHOCYTES # BLD AUTO: 1.02 10*3/MM3 (ref 0.7–3.1)
LYMPHOCYTES # BLD AUTO: 1.02 10*3/MM3 (ref 0.7–3.1)
LYMPHOCYTES # BLD AUTO: 1.04 10*3/MM3 (ref 0.7–3.1)
LYMPHOCYTES # BLD AUTO: 1.1 10*3/MM3 (ref 0.7–3.1)
LYMPHOCYTES # BLD AUTO: 1.1 10*3/MM3 (ref 0.7–3.1)
LYMPHOCYTES # BLD AUTO: 1.16 10*3/MM3 (ref 0.7–3.1)
LYMPHOCYTES # BLD AUTO: 1.2 10*3/MM3 (ref 0.7–3.1)
LYMPHOCYTES # BLD AUTO: 1.7 10*3/MM3 (ref 0.7–3.1)
LYMPHOCYTES # BLD MANUAL: 0.22 10*3/MM3 (ref 0.7–3.1)
LYMPHOCYTES # BLD MANUAL: 0.29 10*3/MM3 (ref 0.7–3.1)
LYMPHOCYTES # BLD MANUAL: 0.49 10*3/MM3 (ref 0.7–3.1)
LYMPHOCYTES # BLD MANUAL: 0.57 10*3/MM3 (ref 0.7–3.1)
LYMPHOCYTES # BLD MANUAL: 1.02 10*3/MM3 (ref 0.7–3.1)
LYMPHOCYTES # BLD MANUAL: 1.31 10*3/MM3 (ref 0.7–3.1)
LYMPHOCYTES NFR BLD AUTO: 10.4 % (ref 19.6–45.3)
LYMPHOCYTES NFR BLD AUTO: 11.3 % (ref 19.6–45.3)
LYMPHOCYTES NFR BLD AUTO: 11.9 % (ref 19.6–45.3)
LYMPHOCYTES NFR BLD AUTO: 12.6 % (ref 19.6–45.3)
LYMPHOCYTES NFR BLD AUTO: 12.6 % (ref 19.6–45.3)
LYMPHOCYTES NFR BLD AUTO: 12.9 % (ref 19.6–45.3)
LYMPHOCYTES NFR BLD AUTO: 14.7 % (ref 19.6–45.3)
LYMPHOCYTES NFR BLD AUTO: 15.1 % (ref 19.6–45.3)
LYMPHOCYTES NFR BLD AUTO: 15.5 % (ref 19.6–45.3)
LYMPHOCYTES NFR BLD AUTO: 15.6 % (ref 19.6–45.3)
LYMPHOCYTES NFR BLD AUTO: 16.2 % (ref 19.6–45.3)
LYMPHOCYTES NFR BLD AUTO: 16.3 % (ref 19.6–45.3)
LYMPHOCYTES NFR BLD AUTO: 16.6 % (ref 19.6–45.3)
LYMPHOCYTES NFR BLD AUTO: 16.9 % (ref 19.6–45.3)
LYMPHOCYTES NFR BLD AUTO: 17.7 % (ref 19.6–45.3)
LYMPHOCYTES NFR BLD AUTO: 17.9 % (ref 19.6–45.3)
LYMPHOCYTES NFR BLD AUTO: 17.9 % (ref 19.6–45.3)
LYMPHOCYTES NFR BLD AUTO: 18.2 % (ref 19.6–45.3)
LYMPHOCYTES NFR BLD AUTO: 19.2 % (ref 19.6–45.3)
LYMPHOCYTES NFR BLD AUTO: 20.9 % (ref 19.6–45.3)
LYMPHOCYTES NFR BLD AUTO: 24.4 % (ref 19.6–45.3)
LYMPHOCYTES NFR BLD AUTO: 9 % (ref 19.6–45.3)
LYMPHOCYTES NFR BLD MANUAL: 11.1 % (ref 19.6–45.3)
LYMPHOCYTES NFR BLD MANUAL: 16 % (ref 19.6–45.3)
LYMPHOCYTES NFR BLD MANUAL: 2 % (ref 5–12)
LYMPHOCYTES NFR BLD MANUAL: 2 % (ref 5–12)
LYMPHOCYTES NFR BLD MANUAL: 4 % (ref 19.6–45.3)
LYMPHOCYTES NFR BLD MANUAL: 4 % (ref 19.6–45.3)
LYMPHOCYTES NFR BLD MANUAL: 4 % (ref 5–12)
LYMPHOCYTES NFR BLD MANUAL: 4 % (ref 5–12)
LYMPHOCYTES NFR BLD MANUAL: 5.1 % (ref 5–12)
LYMPHOCYTES NFR BLD MANUAL: 6.1 % (ref 5–12)
LYMPHOCYTES NFR BLD MANUAL: 7 % (ref 19.6–45.3)
LYMPHOCYTES NFR BLD MANUAL: 8 % (ref 19.6–45.3)
LYMPHOCYTES NFR FLD MANUAL: 34 %
LYMPHOCYTES NFR FLD MANUAL: 58 %
LYMPHOCYTES NFR FLD MANUAL: 60 %
Lab: ABNORMAL
M-SPIKE: ABNORMAL G/DL
MAGNESIUM SERPL-MCNC: 1.7 MG/DL (ref 1.6–2.4)
MAGNESIUM SERPL-MCNC: 1.8 MG/DL (ref 1.6–2.4)
MAGNESIUM SERPL-MCNC: 1.9 MG/DL (ref 1.6–2.4)
MAGNESIUM SERPL-MCNC: 2 MG/DL (ref 1.6–2.4)
MAGNESIUM SERPL-MCNC: 2.1 MG/DL (ref 1.6–2.4)
MAGNESIUM SERPL-MCNC: 2.1 MG/DL (ref 1.6–2.4)
MCH RBC QN AUTO: 27.1 PG (ref 26.6–33)
MCH RBC QN AUTO: 27.3 PG (ref 26.6–33)
MCH RBC QN AUTO: 27.4 PG (ref 26.6–33)
MCH RBC QN AUTO: 27.5 PG (ref 26.6–33)
MCH RBC QN AUTO: 27.5 PG (ref 26.6–33)
MCH RBC QN AUTO: 27.6 PG (ref 26.6–33)
MCH RBC QN AUTO: 27.7 PG (ref 26.6–33)
MCH RBC QN AUTO: 27.8 PG (ref 26.6–33)
MCH RBC QN AUTO: 28.1 PG (ref 26.6–33)
MCH RBC QN AUTO: 28.2 PG (ref 26.6–33)
MCH RBC QN AUTO: 28.3 PG (ref 26.6–33)
MCH RBC QN AUTO: 28.4 PG (ref 26.6–33)
MCH RBC QN AUTO: 28.5 PG (ref 26.6–33)
MCH RBC QN AUTO: 28.7 PG (ref 26.6–33)
MCH RBC QN AUTO: 28.9 PG (ref 26.6–33)
MCH RBC QN AUTO: 29.1 PG (ref 26.6–33)
MCH RBC QN AUTO: 29.1 PG (ref 26.6–33)
MCH RBC QN AUTO: 29.2 PG (ref 26.6–33)
MCH RBC QN AUTO: 29.3 PG (ref 26.6–33)
MCH RBC QN AUTO: 29.4 PG (ref 26.6–33)
MCH RBC QN AUTO: 29.5 PG (ref 26.6–33)
MCH RBC QN AUTO: 29.6 PG (ref 26.6–33)
MCH RBC QN AUTO: 29.7 PG (ref 26.6–33)
MCH RBC QN AUTO: 29.8 PG (ref 26.6–33)
MCH RBC QN AUTO: 29.8 PG (ref 26.6–33)
MCH RBC QN AUTO: 29.9 PG (ref 26.6–33)
MCH RBC QN AUTO: 30 PG (ref 26.6–33)
MCH RBC QN AUTO: 30.1 PG (ref 26.6–33)
MCH RBC QN AUTO: 30.1 PG (ref 26.6–33)
MCH RBC QN AUTO: 30.4 PG (ref 26.6–33)
MCH RBC QN AUTO: 30.8 PG (ref 26.6–33)
MCH RBC QN AUTO: 31.7 PG (ref 26.6–33)
MCHC RBC AUTO-ENTMCNC: 31.9 G/DL (ref 31.5–35.7)
MCHC RBC AUTO-ENTMCNC: 32 G/DL (ref 31.5–35.7)
MCHC RBC AUTO-ENTMCNC: 32.1 G/DL (ref 31.5–35.7)
MCHC RBC AUTO-ENTMCNC: 32.3 G/DL (ref 31.5–35.7)
MCHC RBC AUTO-ENTMCNC: 32.4 G/DL (ref 31.5–35.7)
MCHC RBC AUTO-ENTMCNC: 32.5 G/DL (ref 31.5–35.7)
MCHC RBC AUTO-ENTMCNC: 32.6 G/DL (ref 31.5–35.7)
MCHC RBC AUTO-ENTMCNC: 32.7 G/DL (ref 31.5–35.7)
MCHC RBC AUTO-ENTMCNC: 32.8 G/DL (ref 31.5–35.7)
MCHC RBC AUTO-ENTMCNC: 32.9 G/DL (ref 31.5–35.7)
MCHC RBC AUTO-ENTMCNC: 32.9 G/DL (ref 31.5–35.7)
MCHC RBC AUTO-ENTMCNC: 33 G/DL (ref 31.5–35.7)
MCHC RBC AUTO-ENTMCNC: 33.1 G/DL (ref 31.5–35.7)
MCHC RBC AUTO-ENTMCNC: 33.1 G/DL (ref 31.5–35.7)
MCHC RBC AUTO-ENTMCNC: 33.2 G/DL (ref 31.5–35.7)
MCHC RBC AUTO-ENTMCNC: 33.3 G/DL (ref 31.5–35.7)
MCHC RBC AUTO-ENTMCNC: 33.4 G/DL (ref 31.5–35.7)
MCHC RBC AUTO-ENTMCNC: 33.6 G/DL (ref 31.5–35.7)
MCHC RBC AUTO-ENTMCNC: 33.7 G/DL (ref 31.5–35.7)
MCHC RBC AUTO-ENTMCNC: 33.8 G/DL (ref 31.5–35.7)
MCHC RBC AUTO-ENTMCNC: 33.8 G/DL (ref 31.5–35.7)
MCHC RBC AUTO-ENTMCNC: 34 G/DL (ref 31.5–35.7)
MCHC RBC AUTO-ENTMCNC: 34.2 G/DL (ref 31.5–35.7)
MCHC RBC AUTO-ENTMCNC: 34.3 G/DL (ref 31.5–35.7)
MCHC RBC AUTO-ENTMCNC: 34.4 G/DL (ref 31.5–35.7)
MCHC RBC AUTO-ENTMCNC: 34.7 G/DL (ref 31.5–35.7)
MCHC RBC AUTO-ENTMCNC: 34.8 G/DL (ref 31.5–35.7)
MCHC RBC AUTO-ENTMCNC: 36.8 G/DL (ref 31.5–35.7)
MCV RBC AUTO: 82.9 FL (ref 79–97)
MCV RBC AUTO: 83.3 FL (ref 79–97)
MCV RBC AUTO: 83.5 FL (ref 79–97)
MCV RBC AUTO: 83.6 FL (ref 79–97)
MCV RBC AUTO: 83.8 FL (ref 79–97)
MCV RBC AUTO: 84 FL (ref 79–97)
MCV RBC AUTO: 84.1 FL (ref 79–97)
MCV RBC AUTO: 84.1 FL (ref 79–97)
MCV RBC AUTO: 84.4 FL (ref 79–97)
MCV RBC AUTO: 85 FL (ref 79–97)
MCV RBC AUTO: 85.6 FL (ref 79–97)
MCV RBC AUTO: 85.8 FL (ref 79–97)
MCV RBC AUTO: 86 FL (ref 79–97)
MCV RBC AUTO: 86.1 FL (ref 79–97)
MCV RBC AUTO: 86.1 FL (ref 79–97)
MCV RBC AUTO: 86.5 FL (ref 79–97)
MCV RBC AUTO: 87.5 FL (ref 79–97)
MCV RBC AUTO: 87.6 FL (ref 79–97)
MCV RBC AUTO: 87.6 FL (ref 79–97)
MCV RBC AUTO: 87.7 FL (ref 79–97)
MCV RBC AUTO: 88 FL (ref 79–97)
MCV RBC AUTO: 88.2 FL (ref 79–97)
MCV RBC AUTO: 88.3 FL (ref 79–97)
MCV RBC AUTO: 88.3 FL (ref 79–97)
MCV RBC AUTO: 88.4 FL (ref 79–97)
MCV RBC AUTO: 88.6 FL (ref 79–97)
MCV RBC AUTO: 88.6 FL (ref 79–97)
MCV RBC AUTO: 88.7 FL (ref 79–97)
MCV RBC AUTO: 88.8 FL (ref 79–97)
MCV RBC AUTO: 88.9 FL (ref 79–97)
MCV RBC AUTO: 89.1 FL (ref 79–97)
MCV RBC AUTO: 89.2 FL (ref 79–97)
MCV RBC AUTO: 89.4 FL (ref 79–97)
MCV RBC AUTO: 89.5 FL (ref 79–97)
MCV RBC AUTO: 89.8 FL (ref 79–97)
MCV RBC AUTO: 89.8 FL (ref 79–97)
MCV RBC AUTO: 90.2 FL (ref 79–97)
MCV RBC AUTO: 90.5 FL (ref 79–97)
MCV RBC AUTO: 90.6 FL (ref 79–97)
MESOTHL CELL NFR FLD MANUAL: 2 %
METHOD: NORMAL
MONOCYTES # BLD AUTO: 0.11 10*3/MM3 (ref 0.1–0.9)
MONOCYTES # BLD AUTO: 0.16 10*3/MM3 (ref 0.1–0.9)
MONOCYTES # BLD AUTO: 0.24 10*3/MM3 (ref 0.1–0.9)
MONOCYTES # BLD AUTO: 0.24 10*3/MM3 (ref 0.1–0.9)
MONOCYTES # BLD AUTO: 0.32 10*3/MM3 (ref 0.1–0.9)
MONOCYTES # BLD AUTO: 0.33 10*3/MM3 (ref 0.1–0.9)
MONOCYTES # BLD AUTO: 0.34 10*3/MM3 (ref 0.1–0.9)
MONOCYTES # BLD AUTO: 0.37 10*3/MM3 (ref 0.1–0.9)
MONOCYTES # BLD AUTO: 0.4 10*3/MM3 (ref 0.1–0.9)
MONOCYTES # BLD AUTO: 0.41 10*3/MM3 (ref 0.1–0.9)
MONOCYTES # BLD AUTO: 0.5 10*3/MM3 (ref 0.1–0.9)
MONOCYTES # BLD AUTO: 0.56 10*3/MM3 (ref 0.1–0.9)
MONOCYTES # BLD AUTO: 0.6 10*3/MM3 (ref 0.1–0.9)
MONOCYTES # BLD AUTO: 0.6 10*3/MM3 (ref 0.1–0.9)
MONOCYTES # BLD AUTO: 0.62 10*3/MM3 (ref 0.1–0.9)
MONOCYTES # BLD AUTO: 0.68 10*3/MM3 (ref 0.1–0.9)
MONOCYTES # BLD AUTO: 0.71 10*3/MM3 (ref 0.1–0.9)
MONOCYTES # BLD AUTO: 0.71 10*3/MM3 (ref 0.1–0.9)
MONOCYTES # BLD AUTO: 0.78 10*3/MM3 (ref 0.1–0.9)
MONOCYTES NFR BLD AUTO: 10.2 % (ref 5–12)
MONOCYTES NFR BLD AUTO: 14.8 % (ref 5–12)
MONOCYTES NFR BLD AUTO: 5.7 % (ref 5–12)
MONOCYTES NFR BLD AUTO: 5.8 % (ref 5–12)
MONOCYTES NFR BLD AUTO: 6.4 % (ref 5–12)
MONOCYTES NFR BLD AUTO: 6.6 % (ref 5–12)
MONOCYTES NFR BLD AUTO: 6.6 % (ref 5–12)
MONOCYTES NFR BLD AUTO: 7 % (ref 5–12)
MONOCYTES NFR BLD AUTO: 7.4 % (ref 5–12)
MONOCYTES NFR BLD AUTO: 7.5 % (ref 5–12)
MONOCYTES NFR BLD AUTO: 8 % (ref 5–12)
MONOCYTES NFR BLD AUTO: 8.1 % (ref 5–12)
MONOCYTES NFR BLD AUTO: 8.7 % (ref 5–12)
MONOCYTES NFR BLD AUTO: 8.8 % (ref 5–12)
MONOCYTES NFR BLD AUTO: 9 % (ref 5–12)
MONOCYTES NFR BLD AUTO: 9.1 % (ref 5–12)
MONOCYTES NFR BLD AUTO: 9.3 % (ref 5–12)
MONOCYTES NFR BLD AUTO: 9.3 % (ref 5–12)
MONOCYTES NFR BLD AUTO: 9.8 % (ref 5–12)
MONOCYTES NFR BLD AUTO: 9.8 % (ref 5–12)
MONOCYTES NFR FLD: 14 %
MONOCYTES NFR FLD: 6 %
MONOCYTES NFR FLD: 8 %
MONOS+MACROS NFR FLD: 1 %
MONOS+MACROS NFR FLD: 33 %
NEUTROPHILS # BLD AUTO: 2 10*3/MM3 (ref 1.7–7)
NEUTROPHILS # BLD AUTO: 2.53 10*3/MM3 (ref 1.7–7)
NEUTROPHILS # BLD AUTO: 2.75 10*3/MM3 (ref 1.7–7)
NEUTROPHILS # BLD AUTO: 3 10*3/MM3 (ref 1.7–7)
NEUTROPHILS # BLD AUTO: 3.3 10*3/MM3 (ref 1.7–7)
NEUTROPHILS # BLD AUTO: 3.4 10*3/MM3 (ref 1.7–7)
NEUTROPHILS # BLD AUTO: 3.5 10*3/MM3 (ref 1.7–7)
NEUTROPHILS # BLD AUTO: 4.1 10*3/MM3 (ref 1.7–7)
NEUTROPHILS # BLD AUTO: 4.24 10*3/MM3 (ref 1.7–7)
NEUTROPHILS # BLD AUTO: 4.43 10*3/MM3 (ref 1.7–7)
NEUTROPHILS # BLD AUTO: 4.5 10*3/MM3 (ref 1.7–7)
NEUTROPHILS # BLD AUTO: 4.6 10*3/MM3 (ref 1.7–7)
NEUTROPHILS # BLD AUTO: 4.6 10*3/MM3 (ref 1.7–7)
NEUTROPHILS # BLD AUTO: 4.62 10*3/MM3 (ref 1.7–7)
NEUTROPHILS # BLD AUTO: 4.8 10*3/MM3 (ref 1.7–7)
NEUTROPHILS # BLD AUTO: 5.1 10*3/MM3 (ref 1.7–7)
NEUTROPHILS # BLD AUTO: 5.24 10*3/MM3 (ref 1.7–7)
NEUTROPHILS # BLD AUTO: 5.24 10*3/MM3 (ref 1.7–7)
NEUTROPHILS # BLD AUTO: 5.4 10*3/MM3 (ref 1.7–7)
NEUTROPHILS # BLD AUTO: 6.11 10*3/MM3 (ref 1.7–7)
NEUTROPHILS # BLD AUTO: 6.4 10*3/MM3 (ref 1.7–7)
NEUTROPHILS # BLD AUTO: 6.56 10*3/MM3 (ref 1.7–7)
NEUTROPHILS # BLD AUTO: 6.59 10*3/MM3 (ref 1.7–7)
NEUTROPHILS # BLD AUTO: 6.65 10*3/MM3 (ref 1.7–7)
NEUTROPHILS # BLD AUTO: 6.65 10*3/MM3 (ref 1.7–7)
NEUTROPHILS # BLD AUTO: 6.72 10*3/MM3 (ref 1.7–7)
NEUTROPHILS # BLD AUTO: 7.37 10*3/MM3 (ref 1.7–7)
NEUTROPHILS # BLD AUTO: 7.5 10*3/MM3 (ref 1.7–7)
NEUTROPHILS NFR BLD AUTO: 59.5 % (ref 42.7–76)
NEUTROPHILS NFR BLD AUTO: 70.5 % (ref 42.7–76)
NEUTROPHILS NFR BLD AUTO: 71.2 % (ref 42.7–76)
NEUTROPHILS NFR BLD AUTO: 71.6 % (ref 42.7–76)
NEUTROPHILS NFR BLD AUTO: 73.3 % (ref 42.7–76)
NEUTROPHILS NFR BLD AUTO: 73.5 % (ref 42.7–76)
NEUTROPHILS NFR BLD AUTO: 73.5 % (ref 42.7–76)
NEUTROPHILS NFR BLD AUTO: 73.7 % (ref 42.7–76)
NEUTROPHILS NFR BLD AUTO: 74 % (ref 42.7–76)
NEUTROPHILS NFR BLD AUTO: 74.2 % (ref 42.7–76)
NEUTROPHILS NFR BLD AUTO: 75.3 % (ref 42.7–76)
NEUTROPHILS NFR BLD AUTO: 76 % (ref 42.7–76)
NEUTROPHILS NFR BLD AUTO: 76 % (ref 42.7–76)
NEUTROPHILS NFR BLD AUTO: 76.5 % (ref 42.7–76)
NEUTROPHILS NFR BLD AUTO: 76.7 % (ref 42.7–76)
NEUTROPHILS NFR BLD AUTO: 76.9 % (ref 42.7–76)
NEUTROPHILS NFR BLD AUTO: 77.5 % (ref 42.7–76)
NEUTROPHILS NFR BLD AUTO: 77.7 % (ref 42.7–76)
NEUTROPHILS NFR BLD AUTO: 78.3 % (ref 42.7–76)
NEUTROPHILS NFR BLD AUTO: 79.4 % (ref 42.7–76)
NEUTROPHILS NFR BLD AUTO: 81.3 % (ref 42.7–76)
NEUTROPHILS NFR BLD AUTO: 81.6 % (ref 42.7–76)
NEUTROPHILS NFR BLD MANUAL: 59 % (ref 42.7–76)
NEUTROPHILS NFR BLD MANUAL: 81.8 % (ref 42.7–76)
NEUTROPHILS NFR BLD MANUAL: 86 % (ref 42.7–76)
NEUTROPHILS NFR BLD MANUAL: 90 % (ref 42.7–76)
NEUTROPHILS NFR BLD MANUAL: 90.9 % (ref 42.7–76)
NEUTROPHILS NFR BLD MANUAL: 94 % (ref 42.7–76)
NEUTROPHILS NFR FLD MANUAL: 26 %
NEUTROPHILS NFR FLD MANUAL: 27 %
NEUTROPHILS NFR FLD MANUAL: 31 %
NEUTS BAND NFR BLD MANUAL: 21 % (ref 0–5)
NITRITE UR QL STRIP: NEGATIVE
NITRITE UR QL STRIP: POSITIVE
NITRITE UR QL STRIP: POSITIVE
NRBC BLD AUTO-RTO: 0 /100 WBC (ref 0–0.2)
NRBC BLD AUTO-RTO: 0.1 /100 WBC (ref 0–0.2)
NRBC BLD AUTO-RTO: 0.2 /100 WBC (ref 0–0.2)
NRBC FLD-RTO: 2 /100 WBCS
NT-PROBNP SERPL-MCNC: 1348 PG/ML (ref 5–900)
NUC CELL # FLD: 61 /MM3
PATH REPORT.FINAL DX SPEC: NORMAL
PATH REPORT.GROSS SPEC: NORMAL
PH FLD: 7.61 [PH]
PH FLD: 7.8 [PH] (ref 6.5–7.5)
PH UR STRIP.AUTO: 6 [PH] (ref 5–8)
PH UR STRIP.AUTO: 6.5 [PH] (ref 5–8)
PH UR STRIP.AUTO: 7 [PH] (ref 5–8)
PHOSPHATE SERPL-MCNC: 2.2 MG/DL (ref 2.5–4.5)
PHOSPHATE SERPL-MCNC: 2.4 MG/DL (ref 2.5–4.5)
PHOSPHATE SERPL-MCNC: 2.4 MG/DL (ref 2.5–4.5)
PHOSPHATE SERPL-MCNC: 2.5 MG/DL (ref 2.5–4.5)
PHOSPHATE SERPL-MCNC: 2.6 MG/DL (ref 2.5–4.5)
PHOSPHATE SERPL-MCNC: 2.7 MG/DL (ref 2.5–4.5)
PHOSPHATE SERPL-MCNC: 2.8 MG/DL (ref 2.5–4.5)
PHOSPHATE SERPL-MCNC: 2.8 MG/DL (ref 2.5–4.5)
PHOSPHATE SERPL-MCNC: 2.9 MG/DL (ref 2.5–4.5)
PHOSPHATE SERPL-MCNC: 3.3 MG/DL (ref 2.5–4.5)
PHOSPHATE SERPL-MCNC: 3.4 MG/DL (ref 2.5–4.5)
PHOSPHATE SERPL-MCNC: 3.4 MG/DL (ref 2.5–4.5)
PHOSPHATE SERPL-MCNC: 3.6 MG/DL (ref 2.5–4.5)
PHOSPHATE SERPL-MCNC: 3.8 MG/DL (ref 2.5–4.5)
PHOSPHATE SERPL-MCNC: 4.2 MG/DL (ref 2.5–4.5)
PHOSPHATE SERPL-MCNC: 4.6 MG/DL (ref 2.5–4.5)
PHOSPHATE SERPL-MCNC: 5.1 MG/DL (ref 2.5–4.5)
PLAT MORPH BLD: NORMAL
PLATELET # BLD AUTO: 100 10*3/MM3 (ref 140–450)
PLATELET # BLD AUTO: 100 10*3/MM3 (ref 140–450)
PLATELET # BLD AUTO: 101 10*3/MM3 (ref 140–450)
PLATELET # BLD AUTO: 105 10*3/MM3 (ref 140–450)
PLATELET # BLD AUTO: 106 10*3/MM3 (ref 140–450)
PLATELET # BLD AUTO: 106 10*3/MM3 (ref 140–450)
PLATELET # BLD AUTO: 107 10*3/MM3 (ref 140–450)
PLATELET # BLD AUTO: 108 10*3/MM3 (ref 140–450)
PLATELET # BLD AUTO: 110 10*3/MM3 (ref 140–450)
PLATELET # BLD AUTO: 114 10*3/MM3 (ref 140–450)
PLATELET # BLD AUTO: 116 10*3/MM3 (ref 140–450)
PLATELET # BLD AUTO: 122 10*3/MM3 (ref 140–450)
PLATELET # BLD AUTO: 122 10*3/MM3 (ref 140–450)
PLATELET # BLD AUTO: 123 10*3/MM3 (ref 140–450)
PLATELET # BLD AUTO: 127 10*3/MM3 (ref 140–450)
PLATELET # BLD AUTO: 132 10*3/MM3 (ref 140–450)
PLATELET # BLD AUTO: 134 10*3/MM3 (ref 140–450)
PLATELET # BLD AUTO: 134 10*3/MM3 (ref 140–450)
PLATELET # BLD AUTO: 137 10*3/MM3 (ref 140–450)
PLATELET # BLD AUTO: 146 10*3/MM3 (ref 140–450)
PLATELET # BLD AUTO: 148 10*3/MM3 (ref 140–450)
PLATELET # BLD AUTO: 157 10*3/MM3 (ref 140–450)
PLATELET # BLD AUTO: 171 10*3/MM3 (ref 140–450)
PLATELET # BLD AUTO: 180 10*3/MM3 (ref 140–450)
PLATELET # BLD AUTO: 188 10*3/MM3 (ref 140–450)
PLATELET # BLD AUTO: 193 10*3/MM3 (ref 140–450)
PLATELET # BLD AUTO: 69 10*3/MM3 (ref 140–450)
PLATELET # BLD AUTO: 69 10*3/MM3 (ref 140–450)
PLATELET # BLD AUTO: 74 10*3/MM3 (ref 140–450)
PLATELET # BLD AUTO: 79 10*3/MM3 (ref 140–450)
PLATELET # BLD AUTO: 85 10*3/MM3 (ref 140–450)
PLATELET # BLD AUTO: 91 10*3/MM3 (ref 140–450)
PLATELET # BLD AUTO: 95 10*3/MM3 (ref 140–450)
PLATELET # BLD AUTO: 96 10*3/MM3 (ref 140–450)
PLATELET # BLD AUTO: 96 10*3/MM3 (ref 140–450)
PLATELET # BLD AUTO: 97 10*3/MM3 (ref 140–450)
PLATELET # BLD AUTO: 98 10*3/MM3 (ref 140–450)
PLATELET # BLD AUTO: 98 10*3/MM3 (ref 140–450)
PLATELET # BLD AUTO: 99 10*3/MM3 (ref 140–450)
PMV BLD AUTO: 6 FL (ref 6–12)
PMV BLD AUTO: 6.2 FL (ref 6–12)
PMV BLD AUTO: 6.3 FL (ref 6–12)
PMV BLD AUTO: 6.3 FL (ref 6–12)
PMV BLD AUTO: 6.4 FL (ref 6–12)
PMV BLD AUTO: 6.5 FL (ref 6–12)
PMV BLD AUTO: 6.6 FL (ref 6–12)
PMV BLD AUTO: 6.7 FL (ref 6–12)
PMV BLD AUTO: 6.7 FL (ref 6–12)
PMV BLD AUTO: 6.8 FL (ref 6–12)
PMV BLD AUTO: 6.9 FL (ref 6–12)
PMV BLD AUTO: 7 FL (ref 6–12)
PMV BLD AUTO: 7 FL (ref 6–12)
PMV BLD AUTO: 7.3 FL (ref 6–12)
PMV BLD AUTO: 7.4 FL (ref 6–12)
PMV BLD AUTO: 8 FL (ref 6–12)
PMV BLD AUTO: 8.1 FL (ref 6–12)
PMV BLD AUTO: 8.3 FL (ref 6–12)
PMV BLD AUTO: 8.4 FL (ref 6–12)
PMV BLD AUTO: 8.4 FL (ref 6–12)
PMV BLD AUTO: 8.5 FL (ref 6–12)
PMV BLD AUTO: 8.6 FL (ref 6–12)
PMV BLD AUTO: 8.7 FL (ref 6–12)
PMV BLD AUTO: 8.7 FL (ref 6–12)
PMV BLD AUTO: 8.8 FL (ref 6–12)
PMV BLD AUTO: 8.9 FL (ref 6–12)
PMV BLD AUTO: 9 FL (ref 6–12)
PMV BLD AUTO: 9.2 FL (ref 6–12)
PMV BLD AUTO: 9.3 FL (ref 6–12)
POIKILOCYTOSIS BLD QL SMEAR: ABNORMAL
POLYCHROMASIA BLD QL SMEAR: ABNORMAL
POTASSIUM BLD-SCNC: 2.9 MMOL/L (ref 3.5–5.2)
POTASSIUM BLD-SCNC: 3 MMOL/L (ref 3.5–5.2)
POTASSIUM BLD-SCNC: 3.1 MMOL/L (ref 3.5–5.2)
POTASSIUM BLD-SCNC: 3.1 MMOL/L (ref 3.5–5.2)
POTASSIUM BLD-SCNC: 3.3 MMOL/L (ref 3.5–5.2)
POTASSIUM BLD-SCNC: 3.4 MMOL/L (ref 3.5–5.2)
POTASSIUM BLD-SCNC: 3.7 MMOL/L (ref 3.5–5.2)
POTASSIUM BLD-SCNC: 3.9 MMOL/L (ref 3.5–5.2)
POTASSIUM BLD-SCNC: 4 MMOL/L (ref 3.5–5.2)
POTASSIUM BLD-SCNC: 4.1 MMOL/L (ref 3.5–5.2)
POTASSIUM BLD-SCNC: 4.2 MMOL/L (ref 3.5–5.2)
POTASSIUM BLD-SCNC: 4.5 MMOL/L (ref 3.5–5.2)
POTASSIUM BLD-SCNC: 4.5 MMOL/L (ref 3.5–5.2)
POTASSIUM BLD-SCNC: 4.7 MMOL/L (ref 3.5–5.2)
POTASSIUM BLD-SCNC: 4.8 MMOL/L (ref 3.5–5.2)
POTASSIUM BLD-SCNC: 4.9 MMOL/L (ref 3.5–5.2)
POTASSIUM BLD-SCNC: 5 MMOL/L (ref 3.5–5.2)
POTASSIUM BLD-SCNC: 5 MMOL/L (ref 3.5–5.2)
POTASSIUM BLD-SCNC: 5.2 MMOL/L (ref 3.5–5.2)
POTASSIUM BLD-SCNC: 5.4 MMOL/L (ref 3.5–5.2)
POTASSIUM BLD-SCNC: 5.5 MMOL/L (ref 3.5–5.2)
POTASSIUM BLD-SCNC: 6.4 MMOL/L (ref 3.5–5.2)
PRESERVATIVE ANTIBODY: NORMAL
PRESERVATIVE ANTIBODY: NORMAL
PROT C ACT/NOR PPP: 72 % (ref 70–130)
PROT FLD-MCNC: 2.2 G/DL
PROT FLD-MCNC: 2.4 G/DL
PROT SERPL-MCNC: 4.6 G/DL (ref 6–8.5)
PROT SERPL-MCNC: 4.8 G/DL (ref 6–8.5)
PROT SERPL-MCNC: 4.8 G/DL (ref 6–8.5)
PROT SERPL-MCNC: 4.9 G/DL (ref 6–8.5)
PROT SERPL-MCNC: 4.9 G/DL (ref 6–8.5)
PROT SERPL-MCNC: 5 G/DL (ref 6–8.5)
PROT SERPL-MCNC: 5.1 G/DL (ref 6–8.5)
PROT SERPL-MCNC: 5.2 G/DL (ref 6–8.5)
PROT SERPL-MCNC: 5.3 G/DL (ref 6–8.5)
PROT SERPL-MCNC: 5.4 G/DL (ref 6–8.5)
PROT SERPL-MCNC: 5.5 G/DL (ref 6–8.5)
PROT SERPL-MCNC: 5.6 G/DL (ref 6–8.5)
PROT SERPL-MCNC: 5.8 G/DL (ref 6–8.5)
PROT SERPL-MCNC: 6 G/DL (ref 6–8.5)
PROT SERPL-MCNC: 6 G/DL (ref 6–8.5)
PROT SERPL-MCNC: 6.1 G/DL (ref 6–8.5)
PROT SERPL-MCNC: 6.5 G/DL (ref 6–8.5)
PROT UR QL STRIP: ABNORMAL
PROTEIN S-FUNCTIONAL: 30 % (ref 63–140)
PROTHROMBIN TIME: 12 SECONDS (ref 9.6–11.7)
PROTHROMBIN TIME: 12.8 SECONDS (ref 9.6–11.7)
PROTHROMBIN TIME: 13.3 SECONDS (ref 9.6–11.7)
PROTHROMBIN TIME: 13.4 SECONDS (ref 9.6–11.7)
PROTHROMBIN TIME: 13.9 SECONDS (ref 9.6–11.7)
PROTHROMBIN TIME: 14.1 SECONDS (ref 9.6–11.7)
PROTHROMBIN TIME: 15 SECONDS (ref 11.7–14.2)
PROTHROMBIN TIME: 15.1 SECONDS (ref 11.7–14.2)
PROTHROMBIN TIME: 15.4 SECONDS (ref 11.7–14.2)
PS IGA SER-ACNC: 11 APS IGA (ref 0–20)
PS IGG SER-ACNC: 2 GPS IGG (ref 0–11)
PS IGM SER-ACNC: >100 MPS IGM (ref 0–25)
PTH-INTACT SERPL-MCNC: 38.9 PG/ML (ref 15–65)
PTT LA MIX: 60.1 SEC (ref 0–48.9)
RBC # BLD AUTO: 2.14 10*6/MM3 (ref 3.77–5.28)
RBC # BLD AUTO: 2.34 10*6/MM3 (ref 3.77–5.28)
RBC # BLD AUTO: 2.59 10*6/MM3 (ref 3.77–5.28)
RBC # BLD AUTO: 2.64 10*6/MM3 (ref 3.77–5.28)
RBC # BLD AUTO: 2.85 10*6/MM3 (ref 3.77–5.28)
RBC # BLD AUTO: 2.89 10*6/MM3 (ref 3.77–5.28)
RBC # BLD AUTO: 2.91 10*6/MM3 (ref 3.77–5.28)
RBC # BLD AUTO: 2.93 10*6/MM3 (ref 3.77–5.28)
RBC # BLD AUTO: 2.96 10*6/MM3 (ref 3.77–5.28)
RBC # BLD AUTO: 2.98 10*6/MM3 (ref 3.77–5.28)
RBC # BLD AUTO: 3 10*6/MM3 (ref 3.77–5.28)
RBC # BLD AUTO: 3.03 10*6/MM3 (ref 3.77–5.28)
RBC # BLD AUTO: 3.06 10*6/MM3 (ref 3.77–5.28)
RBC # BLD AUTO: 3.06 10*6/MM3 (ref 3.77–5.28)
RBC # BLD AUTO: 3.08 10*6/MM3 (ref 3.77–5.28)
RBC # BLD AUTO: 3.09 10*6/MM3 (ref 3.77–5.28)
RBC # BLD AUTO: 3.15 10*6/MM3 (ref 3.77–5.28)
RBC # BLD AUTO: 3.16 10*6/MM3 (ref 3.77–5.28)
RBC # BLD AUTO: 3.22 10*6/MM3 (ref 3.77–5.28)
RBC # BLD AUTO: 3.29 10*6/MM3 (ref 3.77–5.28)
RBC # BLD AUTO: 3.29 10*6/MM3 (ref 3.77–5.28)
RBC # BLD AUTO: 3.31 10*6/MM3 (ref 3.77–5.28)
RBC # BLD AUTO: 3.32 10*6/MM3 (ref 3.77–5.28)
RBC # BLD AUTO: 3.32 10*6/MM3 (ref 3.77–5.28)
RBC # BLD AUTO: 3.33 10*6/MM3 (ref 3.77–5.28)
RBC # BLD AUTO: 3.45 10*6/MM3 (ref 3.77–5.28)
RBC # BLD AUTO: 3.47 10*6/MM3 (ref 3.77–5.28)
RBC # BLD AUTO: 3.52 10*6/MM3 (ref 3.77–5.28)
RBC # BLD AUTO: 3.53 10*6/MM3 (ref 3.77–5.28)
RBC # BLD AUTO: 3.56 10*6/MM3 (ref 3.77–5.28)
RBC # BLD AUTO: 3.57 10*6/MM3 (ref 3.77–5.28)
RBC # BLD AUTO: 3.6 10*6/MM3 (ref 3.77–5.28)
RBC # BLD AUTO: 3.68 10*6/MM3 (ref 3.77–5.28)
RBC # BLD AUTO: 3.69 10*6/MM3 (ref 3.77–5.28)
RBC # BLD AUTO: 3.84 10*6/MM3 (ref 3.77–5.28)
RBC # BLD AUTO: 3.92 10*6/MM3 (ref 3.77–5.28)
RBC # BLD AUTO: 3.92 10*6/MM3 (ref 3.77–5.28)
RBC # BLD AUTO: 3.95 10*6/MM3 (ref 3.77–5.28)
RBC # BLD AUTO: 4.12 10*6/MM3 (ref 3.77–5.28)
RBC # FLD AUTO: 265 /MM3
RBC # FLD AUTO: 643 /MM3
RBC # UR: ABNORMAL /HPF
RBC MORPH BLD: NORMAL
REF LAB TEST METHOD: ABNORMAL
REF LAB TEST METHOD: NORMAL
RETICS # AUTO: 0.04 10*6/MM3 (ref 0.02–0.13)
RETICS/RBC NFR AUTO: 1.33 % (ref 0.7–1.9)
RH BLD: POSITIVE
SCAN SLIDE: NORMAL
SCREEN DRVVT: 1.7 RATIO (ref 0.8–1.2)
SCREEN DRVVT: 81 SEC (ref 0–47)
SODIUM BLD-SCNC: 128 MMOL/L (ref 136–145)
SODIUM BLD-SCNC: 128 MMOL/L (ref 136–145)
SODIUM BLD-SCNC: 129 MMOL/L (ref 136–145)
SODIUM BLD-SCNC: 129 MMOL/L (ref 136–145)
SODIUM BLD-SCNC: 130 MMOL/L (ref 136–145)
SODIUM BLD-SCNC: 131 MMOL/L (ref 136–145)
SODIUM BLD-SCNC: 132 MMOL/L (ref 136–145)
SODIUM BLD-SCNC: 133 MMOL/L (ref 136–145)
SODIUM BLD-SCNC: 134 MMOL/L (ref 136–145)
SODIUM BLD-SCNC: 135 MMOL/L (ref 136–145)
SODIUM BLD-SCNC: 135 MMOL/L (ref 136–145)
SODIUM BLD-SCNC: 136 MMOL/L (ref 136–145)
SODIUM BLD-SCNC: 137 MMOL/L (ref 136–145)
SODIUM BLD-SCNC: 138 MMOL/L (ref 136–145)
SODIUM UR-SCNC: 118 MMOL/L
SODIUM UR-SCNC: 71 MMOL/L
SODIUM UR-SCNC: 82 MMOL/L
SP GR UR STRIP: 1.01 (ref 1–1.03)
SP GR UR STRIP: 1.02 (ref 1–1.03)
SQUAMOUS #/AREA URNS HPF: ABNORMAL /HPF
T&S EXPIRATION DATE: NORMAL
T&S EXPIRATION DATE: NORMAL
THROMBIN NEUTRALIZATION: 17.2 SEC (ref 0–23)
THROMBIN TIME MIX: >150 SEC (ref 0–23)
THROMBIN TIME: >150 SEC (ref 0–23)
TIBC SERPL-MCNC: 153 MCG/DL (ref 298–536)
TRANSFERRIN SERPL-MCNC: 103 MG/DL (ref 200–360)
TROPONIN T SERPL-MCNC: 0.01 NG/ML (ref 0–0.03)
TSH SERPL DL<=0.05 MIU/L-ACNC: 3.35 UIU/ML (ref 0.27–4.2)
TSH SERPL DL<=0.05 MIU/L-ACNC: 3.5 UIU/ML (ref 0.27–4.2)
TSH SERPL DL<=0.05 MIU/L-ACNC: 7.3 UIU/ML (ref 0.27–4.2)
UNIT  ABO: NORMAL
UNIT  RH: NORMAL
URATE SERPL-MCNC: 10.5 MG/DL (ref 2.4–5.7)
URATE SERPL-MCNC: 3.2 MG/DL (ref 2.4–5.7)
URATE SERPL-MCNC: 9.1 MG/DL (ref 2.4–5.7)
UROBILINOGEN UR QL STRIP: ABNORMAL
VIT B12 BLD-MCNC: 1502 PG/ML (ref 211–946)
WBC # FLD AUTO: 186 /MM3
WBC # FLD AUTO: 249 /MM3
WBC MORPH BLD: NORMAL
WBC NRBC COR # BLD: 3.3 10*3/MM3 (ref 3.4–10.8)
WBC NRBC COR # BLD: 3.33 10*3/MM3 (ref 3.4–10.8)
WBC NRBC COR # BLD: 3.6 10*3/MM3 (ref 3.4–10.8)
WBC NRBC COR # BLD: 3.62 10*3/MM3 (ref 3.4–10.8)
WBC NRBC COR # BLD: 4.1 10*3/MM3 (ref 3.4–10.8)
WBC NRBC COR # BLD: 4.3 10*3/MM3 (ref 3.4–10.8)
WBC NRBC COR # BLD: 4.4 10*3/MM3 (ref 3.4–10.8)
WBC NRBC COR # BLD: 4.6 10*3/MM3 (ref 3.4–10.8)
WBC NRBC COR # BLD: 4.7 10*3/MM3 (ref 3.4–10.8)
WBC NRBC COR # BLD: 4.7 10*3/MM3 (ref 3.4–10.8)
WBC NRBC COR # BLD: 5.3 10*3/MM3 (ref 3.4–10.8)
WBC NRBC COR # BLD: 5.4 10*3/MM3 (ref 3.4–10.8)
WBC NRBC COR # BLD: 5.46 10*3/MM3 (ref 3.4–10.8)
WBC NRBC COR # BLD: 5.47 10*3/MM3 (ref 3.4–10.8)
WBC NRBC COR # BLD: 5.5 10*3/MM3 (ref 3.4–10.8)
WBC NRBC COR # BLD: 5.57 10*3/MM3 (ref 3.4–10.8)
WBC NRBC COR # BLD: 5.65 10*3/MM3 (ref 3.4–10.8)
WBC NRBC COR # BLD: 5.8 10*3/MM3 (ref 3.4–10.8)
WBC NRBC COR # BLD: 5.9 10*3/MM3 (ref 3.4–10.8)
WBC NRBC COR # BLD: 6 10*3/MM3 (ref 3.4–10.8)
WBC NRBC COR # BLD: 6 10*3/MM3 (ref 3.4–10.8)
WBC NRBC COR # BLD: 6.09 10*3/MM3 (ref 3.4–10.8)
WBC NRBC COR # BLD: 6.2 10*3/MM3 (ref 3.4–10.8)
WBC NRBC COR # BLD: 6.48 10*3/MM3 (ref 3.4–10.8)
WBC NRBC COR # BLD: 6.7 10*3/MM3 (ref 3.4–10.8)
WBC NRBC COR # BLD: 6.88 10*3/MM3 (ref 3.4–10.8)
WBC NRBC COR # BLD: 7 10*3/MM3 (ref 3.4–10.8)
WBC NRBC COR # BLD: 7.1 10*3/MM3 (ref 3.4–10.8)
WBC NRBC COR # BLD: 7.2 10*3/MM3 (ref 3.4–10.8)
WBC NRBC COR # BLD: 7.2 10*3/MM3 (ref 3.4–10.8)
WBC NRBC COR # BLD: 7.25 10*3/MM3 (ref 3.4–10.8)
WBC NRBC COR # BLD: 8.1 10*3/MM3 (ref 3.4–10.8)
WBC NRBC COR # BLD: 8.19 10*3/MM3 (ref 3.4–10.8)
WBC NRBC COR # BLD: 8.19 10*3/MM3 (ref 3.4–10.8)
WBC NRBC COR # BLD: 8.2 10*3/MM3 (ref 3.4–10.8)
WBC NRBC COR # BLD: 8.47 10*3/MM3 (ref 3.4–10.8)
WBC NRBC COR # BLD: 8.56 10*3/MM3 (ref 3.4–10.8)
WBC NRBC COR # BLD: 8.7 10*3/MM3 (ref 3.4–10.8)
WBC NRBC COR # BLD: 9.17 10*3/MM3 (ref 3.4–10.8)
WBC UR QL AUTO: ABNORMAL /HPF
WHOLE BLOOD HOLD SPECIMEN: NORMAL

## 2020-01-01 PROCEDURE — 85014 HEMATOCRIT: CPT | Performed by: SURGERY

## 2020-01-01 PROCEDURE — 85730 THROMBOPLASTIN TIME PARTIAL: CPT | Performed by: HOSPITALIST

## 2020-01-01 PROCEDURE — 86900 BLOOD TYPING SEROLOGIC ABO: CPT | Performed by: INTERNAL MEDICINE

## 2020-01-01 PROCEDURE — 25010000002 DIPHENHYDRAMINE PER 50 MG: Performed by: INTERNAL MEDICINE

## 2020-01-01 PROCEDURE — 63710000001 INSULIN REGULAR HUMAN PER 5 UNITS: Performed by: NURSE PRACTITIONER

## 2020-01-01 PROCEDURE — 99231 SBSQ HOSP IP/OBS SF/LOW 25: CPT | Performed by: INTERNAL MEDICINE

## 2020-01-01 PROCEDURE — 87070 CULTURE OTHR SPECIMN AEROBIC: CPT | Performed by: INTERNAL MEDICINE

## 2020-01-01 PROCEDURE — 85025 COMPLETE CBC W/AUTO DIFF WBC: CPT | Performed by: EMERGENCY MEDICINE

## 2020-01-01 PROCEDURE — 93970 EXTREMITY STUDY: CPT

## 2020-01-01 PROCEDURE — 85730 THROMBOPLASTIN TIME PARTIAL: CPT | Performed by: INTERNAL MEDICINE

## 2020-01-01 PROCEDURE — 25010000002 ONDANSETRON PER 1 MG: Performed by: SURGERY

## 2020-01-01 PROCEDURE — 99232 SBSQ HOSP IP/OBS MODERATE 35: CPT | Performed by: INTERNAL MEDICINE

## 2020-01-01 PROCEDURE — 85018 HEMOGLOBIN: CPT | Performed by: SURGERY

## 2020-01-01 PROCEDURE — 87086 URINE CULTURE/COLONY COUNT: CPT | Performed by: NURSE PRACTITIONER

## 2020-01-01 PROCEDURE — 80053 COMPREHEN METABOLIC PANEL: CPT | Performed by: NURSE PRACTITIONER

## 2020-01-01 PROCEDURE — 85025 COMPLETE CBC W/AUTO DIFF WBC: CPT | Performed by: NURSE PRACTITIONER

## 2020-01-01 PROCEDURE — 99223 1ST HOSP IP/OBS HIGH 75: CPT | Performed by: INTERNAL MEDICINE

## 2020-01-01 PROCEDURE — 86850 RBC ANTIBODY SCREEN: CPT | Performed by: SURGERY

## 2020-01-01 PROCEDURE — 99214 OFFICE O/P EST MOD 30 MIN: CPT | Performed by: FAMILY MEDICINE

## 2020-01-01 PROCEDURE — 99232 SBSQ HOSP IP/OBS MODERATE 35: CPT | Performed by: NURSE PRACTITIONER

## 2020-01-01 PROCEDURE — 84100 ASSAY OF PHOSPHORUS: CPT | Performed by: PHYSICAL MEDICINE & REHABILITATION

## 2020-01-01 PROCEDURE — 25010000002 FENTANYL CITRATE (PF) 100 MCG/2ML SOLUTION: Performed by: NURSE ANESTHETIST, CERTIFIED REGISTERED

## 2020-01-01 PROCEDURE — 76775 US EXAM ABDO BACK WALL LIM: CPT

## 2020-01-01 PROCEDURE — 25010000002 CALCIUM GLUCONATE-NACL 1-0.675 GM/50ML-% SOLUTION: Performed by: INTERNAL MEDICINE

## 2020-01-01 PROCEDURE — 80053 COMPREHEN METABOLIC PANEL: CPT | Performed by: PHYSICAL MEDICINE & REHABILITATION

## 2020-01-01 PROCEDURE — 71045 X-RAY EXAM CHEST 1 VIEW: CPT

## 2020-01-01 PROCEDURE — 25010000003 LIDOCAINE 1 % SOLUTION: Performed by: RADIOLOGY

## 2020-01-01 PROCEDURE — 87015 SPECIMEN INFECT AGNT CONCNTJ: CPT | Performed by: INTERNAL MEDICINE

## 2020-01-01 PROCEDURE — 80069 RENAL FUNCTION PANEL: CPT | Performed by: INTERNAL MEDICINE

## 2020-01-01 PROCEDURE — C1880 VENA CAVA FILTER: HCPCS

## 2020-01-01 PROCEDURE — 87086 URINE CULTURE/COLONY COUNT: CPT | Performed by: INTERNAL MEDICINE

## 2020-01-01 PROCEDURE — 74018 RADEX ABDOMEN 1 VIEW: CPT

## 2020-01-01 PROCEDURE — 82330 ASSAY OF CALCIUM: CPT | Performed by: INTERNAL MEDICINE

## 2020-01-01 PROCEDURE — 82140 ASSAY OF AMMONIA: CPT

## 2020-01-01 PROCEDURE — 85025 COMPLETE CBC W/AUTO DIFF WBC: CPT | Performed by: PHYSICAL MEDICINE & REHABILITATION

## 2020-01-01 PROCEDURE — 85014 HEMATOCRIT: CPT | Performed by: INTERNAL MEDICINE

## 2020-01-01 PROCEDURE — 86870 RBC ANTIBODY IDENTIFICATION: CPT | Performed by: INTERNAL MEDICINE

## 2020-01-01 PROCEDURE — 85018 HEMOGLOBIN: CPT | Performed by: NURSE PRACTITIONER

## 2020-01-01 PROCEDURE — 99233 SBSQ HOSP IP/OBS HIGH 50: CPT | Performed by: INTERNAL MEDICINE

## 2020-01-01 PROCEDURE — 93005 ELECTROCARDIOGRAM TRACING: CPT | Performed by: NURSE PRACTITIONER

## 2020-01-01 PROCEDURE — 25010000002 ONDANSETRON PER 1 MG: Performed by: INTERNAL MEDICINE

## 2020-01-01 PROCEDURE — 85007 BL SMEAR W/DIFF WBC COUNT: CPT | Performed by: EMERGENCY MEDICINE

## 2020-01-01 PROCEDURE — 83690 ASSAY OF LIPASE: CPT | Performed by: EMERGENCY MEDICINE

## 2020-01-01 PROCEDURE — 63710000001 ONDANSETRON PER 8 MG: Performed by: SURGERY

## 2020-01-01 PROCEDURE — 86900 BLOOD TYPING SEROLOGIC ABO: CPT | Performed by: SURGERY

## 2020-01-01 PROCEDURE — 83735 ASSAY OF MAGNESIUM: CPT | Performed by: INTERNAL MEDICINE

## 2020-01-01 PROCEDURE — 85730 THROMBOPLASTIN TIME PARTIAL: CPT | Performed by: EMERGENCY MEDICINE

## 2020-01-01 PROCEDURE — 85027 COMPLETE CBC AUTOMATED: CPT | Performed by: PHYSICAL MEDICINE & REHABILITATION

## 2020-01-01 PROCEDURE — 99232 SBSQ HOSP IP/OBS MODERATE 35: CPT | Performed by: HOSPITALIST

## 2020-01-01 PROCEDURE — 25010000002 CEFTRIAXONE PER 250 MG: Performed by: INTERNAL MEDICINE

## 2020-01-01 PROCEDURE — P9047 ALBUMIN (HUMAN), 25%, 50ML: HCPCS | Performed by: INTERNAL MEDICINE

## 2020-01-01 PROCEDURE — 87102 FUNGUS ISOLATION CULTURE: CPT | Performed by: INTERNAL MEDICINE

## 2020-01-01 PROCEDURE — 83735 ASSAY OF MAGNESIUM: CPT | Performed by: NURSE PRACTITIONER

## 2020-01-01 PROCEDURE — 99239 HOSP IP/OBS DSCHRG MGMT >30: CPT | Performed by: INTERNAL MEDICINE

## 2020-01-01 PROCEDURE — 99213 OFFICE O/P EST LOW 20 MIN: CPT | Performed by: NURSE PRACTITIONER

## 2020-01-01 PROCEDURE — 84100 ASSAY OF PHOSPHORUS: CPT | Performed by: INTERNAL MEDICINE

## 2020-01-01 PROCEDURE — 83605 ASSAY OF LACTIC ACID: CPT | Performed by: INTERNAL MEDICINE

## 2020-01-01 PROCEDURE — 80053 COMPREHEN METABOLIC PANEL: CPT | Performed by: INTERNAL MEDICINE

## 2020-01-01 PROCEDURE — 25010000002 IRON SUCROSE PER 1 MG: Performed by: NURSE PRACTITIONER

## 2020-01-01 PROCEDURE — 97110 THERAPEUTIC EXERCISES: CPT

## 2020-01-01 PROCEDURE — 78815 PET IMAGE W/CT SKULL-THIGH: CPT

## 2020-01-01 PROCEDURE — 25010000002 ALBUMIN HUMAN 25% PER 50 ML: Performed by: INTERNAL MEDICINE

## 2020-01-01 PROCEDURE — 88305 TISSUE EXAM BY PATHOLOGIST: CPT | Performed by: INTERNAL MEDICINE

## 2020-01-01 PROCEDURE — 84155 ASSAY OF PROTEIN SERUM: CPT | Performed by: INTERNAL MEDICINE

## 2020-01-01 PROCEDURE — 0 TECHNETIUM TC99M PYROPHOSPHATE: Performed by: INTERNAL MEDICINE

## 2020-01-01 PROCEDURE — 82140 ASSAY OF AMMONIA: CPT | Performed by: PHYSICAL MEDICINE & REHABILITATION

## 2020-01-01 PROCEDURE — 25010000002 SUCCINYLCHOLINE PER 20 MG: Performed by: NURSE ANESTHETIST, CERTIFIED REGISTERED

## 2020-01-01 PROCEDURE — 85027 COMPLETE CBC AUTOMATED: CPT | Performed by: INTERNAL MEDICINE

## 2020-01-01 PROCEDURE — 80048 BASIC METABOLIC PNL TOTAL CA: CPT | Performed by: PHYSICAL MEDICINE & REHABILITATION

## 2020-01-01 PROCEDURE — 99024 POSTOP FOLLOW-UP VISIT: CPT | Performed by: SURGERY

## 2020-01-01 PROCEDURE — 85025 COMPLETE CBC W/AUTO DIFF WBC: CPT | Performed by: SURGERY

## 2020-01-01 PROCEDURE — 84132 ASSAY OF SERUM POTASSIUM: CPT | Performed by: HOSPITALIST

## 2020-01-01 PROCEDURE — 25010000002 PIPERACILLIN SOD-TAZOBACTAM PER 1 G: Performed by: SURGERY

## 2020-01-01 PROCEDURE — P9047 ALBUMIN (HUMAN), 25%, 50ML: HCPCS | Performed by: NURSE PRACTITIONER

## 2020-01-01 PROCEDURE — 88302 TISSUE EXAM BY PATHOLOGIST: CPT | Performed by: SURGERY

## 2020-01-01 PROCEDURE — 25010000002 ONDANSETRON PER 1 MG: Performed by: NURSE PRACTITIONER

## 2020-01-01 PROCEDURE — G0378 HOSPITAL OBSERVATION PER HR: HCPCS

## 2020-01-01 PROCEDURE — 86901 BLOOD TYPING SEROLOGIC RH(D): CPT

## 2020-01-01 PROCEDURE — 85610 PROTHROMBIN TIME: CPT | Performed by: NURSE PRACTITIONER

## 2020-01-01 PROCEDURE — 72110 X-RAY EXAM L-2 SPINE 4/>VWS: CPT

## 2020-01-01 PROCEDURE — 81001 URINALYSIS AUTO W/SCOPE: CPT | Performed by: NURSE PRACTITIONER

## 2020-01-01 PROCEDURE — 84157 ASSAY OF PROTEIN OTHER: CPT | Performed by: INTERNAL MEDICINE

## 2020-01-01 PROCEDURE — 0W993ZZ DRAINAGE OF RIGHT PLEURAL CAVITY, PERCUTANEOUS APPROACH: ICD-10-PCS | Performed by: RADIOLOGY

## 2020-01-01 PROCEDURE — 25010000002 PROMETHAZINE PER 50 MG: Performed by: NURSE PRACTITIONER

## 2020-01-01 PROCEDURE — 86920 COMPATIBILITY TEST SPIN: CPT

## 2020-01-01 PROCEDURE — 25010000002 ONDANSETRON PER 1 MG: Performed by: NURSE ANESTHETIST, CERTIFIED REGISTERED

## 2020-01-01 PROCEDURE — 25010000002 CALCIUM GLUCONATE-NACL 1-0.675 GM/50ML-% SOLUTION: Performed by: NURSE PRACTITIONER

## 2020-01-01 PROCEDURE — 80053 COMPREHEN METABOLIC PANEL: CPT

## 2020-01-01 PROCEDURE — 71046 X-RAY EXAM CHEST 2 VIEWS: CPT

## 2020-01-01 PROCEDURE — P9016 RBC LEUKOCYTES REDUCED: HCPCS

## 2020-01-01 PROCEDURE — 80048 BASIC METABOLIC PNL TOTAL CA: CPT | Performed by: SURGERY

## 2020-01-01 PROCEDURE — 85730 THROMBOPLASTIN TIME PARTIAL: CPT | Performed by: NURSE PRACTITIONER

## 2020-01-01 PROCEDURE — 82140 ASSAY OF AMMONIA: CPT | Performed by: INTERNAL MEDICINE

## 2020-01-01 PROCEDURE — 96366 THER/PROPH/DIAG IV INF ADDON: CPT

## 2020-01-01 PROCEDURE — 25010000002 CEFTRIAXONE PER 250 MG: Performed by: NURSE PRACTITIONER

## 2020-01-01 PROCEDURE — 87206 SMEAR FLUORESCENT/ACID STAI: CPT | Performed by: INTERNAL MEDICINE

## 2020-01-01 PROCEDURE — 25010000002 IOPAMIDOL 61 % SOLUTION: Performed by: HOSPITALIST

## 2020-01-01 PROCEDURE — 99222 1ST HOSP IP/OBS MODERATE 55: CPT | Performed by: SURGERY

## 2020-01-01 PROCEDURE — 93971 EXTREMITY STUDY: CPT

## 2020-01-01 PROCEDURE — 74176 CT ABD & PELVIS W/O CONTRAST: CPT

## 2020-01-01 PROCEDURE — 94799 UNLISTED PULMONARY SVC/PX: CPT

## 2020-01-01 PROCEDURE — 76942 ECHO GUIDE FOR BIOPSY: CPT

## 2020-01-01 PROCEDURE — 88184 FLOWCYTOMETRY/ TC 1 MARKER: CPT | Performed by: NURSE PRACTITIONER

## 2020-01-01 PROCEDURE — 87205 SMEAR GRAM STAIN: CPT | Performed by: INTERNAL MEDICINE

## 2020-01-01 PROCEDURE — 25010000002 FUROSEMIDE PER 20 MG: Performed by: INTERNAL MEDICINE

## 2020-01-01 PROCEDURE — 85014 HEMATOCRIT: CPT | Performed by: NURSE PRACTITIONER

## 2020-01-01 PROCEDURE — 86901 BLOOD TYPING SEROLOGIC RH(D): CPT | Performed by: NURSE PRACTITIONER

## 2020-01-01 PROCEDURE — 85018 HEMOGLOBIN: CPT | Performed by: INTERNAL MEDICINE

## 2020-01-01 PROCEDURE — 25010000002 MORPHINE PER 10 MG: Performed by: SURGERY

## 2020-01-01 PROCEDURE — 83540 ASSAY OF IRON: CPT | Performed by: INTERNAL MEDICINE

## 2020-01-01 PROCEDURE — 25010000002 PHENYLEPHRINE PER 1 ML: Performed by: NURSE ANESTHETIST, CERTIFIED REGISTERED

## 2020-01-01 PROCEDURE — 85610 PROTHROMBIN TIME: CPT | Performed by: EMERGENCY MEDICINE

## 2020-01-01 PROCEDURE — 99285 EMERGENCY DEPT VISIT HI MDM: CPT

## 2020-01-01 PROCEDURE — 82140 ASSAY OF AMMONIA: CPT | Performed by: NURSE PRACTITIONER

## 2020-01-01 PROCEDURE — C1894 INTRO/SHEATH, NON-LASER: HCPCS

## 2020-01-01 PROCEDURE — 25010000002 ALBUMIN HUMAN 25% PER 50 ML: Performed by: NURSE PRACTITIONER

## 2020-01-01 PROCEDURE — 83615 LACTATE (LD) (LDH) ENZYME: CPT | Performed by: INTERNAL MEDICINE

## 2020-01-01 PROCEDURE — 25010000002 HEPARIN (PORCINE) PER 1000 UNITS: Performed by: NURSE PRACTITIONER

## 2020-01-01 PROCEDURE — 36430 TRANSFUSION BLD/BLD COMPNT: CPT

## 2020-01-01 PROCEDURE — 84443 ASSAY THYROID STIM HORMONE: CPT | Performed by: INTERNAL MEDICINE

## 2020-01-01 PROCEDURE — 96365 THER/PROPH/DIAG IV INF INIT: CPT

## 2020-01-01 PROCEDURE — 83970 ASSAY OF PARATHORMONE: CPT | Performed by: INTERNAL MEDICINE

## 2020-01-01 PROCEDURE — 25010000002 CEFOXITIN PER 1 G: Performed by: SURGERY

## 2020-01-01 PROCEDURE — 85613 RUSSELL VIPER VENOM DILUTED: CPT | Performed by: NURSE PRACTITIONER

## 2020-01-01 PROCEDURE — 82962 GLUCOSE BLOOD TEST: CPT

## 2020-01-01 PROCEDURE — 85007 BL SMEAR W/DIFF WBC COUNT: CPT | Performed by: SURGERY

## 2020-01-01 PROCEDURE — 83735 ASSAY OF MAGNESIUM: CPT | Performed by: PHYSICAL MEDICINE & REHABILITATION

## 2020-01-01 PROCEDURE — 0 FLUDEOXYGLUCOSE F18 SOLUTION: Performed by: FAMILY MEDICINE

## 2020-01-01 PROCEDURE — 25010000002 MAGNESIUM SULFATE IN D5W 1G/100ML (PREMIX) 1-5 GM/100ML-% SOLUTION: Performed by: INTERNAL MEDICINE

## 2020-01-01 PROCEDURE — 25010000002 ONDANSETRON PER 1 MG: Performed by: EMERGENCY MEDICINE

## 2020-01-01 PROCEDURE — 85025 COMPLETE CBC W/AUTO DIFF WBC: CPT | Performed by: INTERNAL MEDICINE

## 2020-01-01 PROCEDURE — 86850 RBC ANTIBODY SCREEN: CPT | Performed by: INTERNAL MEDICINE

## 2020-01-01 PROCEDURE — 80053 COMPREHEN METABOLIC PANEL: CPT | Performed by: EMERGENCY MEDICINE

## 2020-01-01 PROCEDURE — 99284 EMERGENCY DEPT VISIT MOD MDM: CPT

## 2020-01-01 PROCEDURE — P9035 PLATELET PHERES LEUKOREDUCED: HCPCS

## 2020-01-01 PROCEDURE — 82140 ASSAY OF AMMONIA: CPT | Performed by: EMERGENCY MEDICINE

## 2020-01-01 PROCEDURE — 86922 COMPATIBILITY TEST ANTIGLOB: CPT

## 2020-01-01 PROCEDURE — 25010000002 HYDROMORPHONE PER 4 MG: Performed by: INTERNAL MEDICINE

## 2020-01-01 PROCEDURE — 25010000002 MORPHINE PER 10 MG: Performed by: INTERNAL MEDICINE

## 2020-01-01 PROCEDURE — 82945 GLUCOSE OTHER FLUID: CPT | Performed by: INTERNAL MEDICINE

## 2020-01-01 PROCEDURE — 81001 URINALYSIS AUTO W/SCOPE: CPT

## 2020-01-01 PROCEDURE — 85610 PROTHROMBIN TIME: CPT | Performed by: INTERNAL MEDICINE

## 2020-01-01 PROCEDURE — 49083 ABD PARACENTESIS W/IMAGING: CPT | Performed by: HOSPITALIST

## 2020-01-01 PROCEDURE — 80048 BASIC METABOLIC PNL TOTAL CA: CPT

## 2020-01-01 PROCEDURE — 25010000002 CALCIUM GLUCONATE PER 10 ML: Performed by: INTERNAL MEDICINE

## 2020-01-01 PROCEDURE — 85007 BL SMEAR W/DIFF WBC COUNT: CPT | Performed by: INTERNAL MEDICINE

## 2020-01-01 PROCEDURE — 86146 BETA-2 GLYCOPROTEIN ANTIBODY: CPT | Performed by: NURSE PRACTITIONER

## 2020-01-01 PROCEDURE — 97116 GAIT TRAINING THERAPY: CPT

## 2020-01-01 PROCEDURE — 82728 ASSAY OF FERRITIN: CPT | Performed by: NURSE PRACTITIONER

## 2020-01-01 PROCEDURE — 85303 CLOT INHIBIT PROT C ACTIVITY: CPT | Performed by: NURSE PRACTITIONER

## 2020-01-01 PROCEDURE — 84300 ASSAY OF URINE SODIUM: CPT | Performed by: INTERNAL MEDICINE

## 2020-01-01 PROCEDURE — 25010000002 PROCHLORPERAZINE 10 MG/2ML SOLUTION: Performed by: INTERNAL MEDICINE

## 2020-01-01 PROCEDURE — 99222 1ST HOSP IP/OBS MODERATE 55: CPT | Performed by: INTERNAL MEDICINE

## 2020-01-01 PROCEDURE — 0W993ZX DRAINAGE OF RIGHT PLEURAL CAVITY, PERCUTANEOUS APPROACH, DIAGNOSTIC: ICD-10-PCS | Performed by: RADIOLOGY

## 2020-01-01 PROCEDURE — 25010000002 FENTANYL CITRATE (PF) 100 MCG/2ML SOLUTION: Performed by: INTERNAL MEDICINE

## 2020-01-01 PROCEDURE — P9612 CATHETERIZE FOR URINE SPEC: HCPCS

## 2020-01-01 PROCEDURE — 99214 OFFICE O/P EST MOD 30 MIN: CPT | Performed by: NURSE PRACTITIONER

## 2020-01-01 PROCEDURE — 85300 ANTITHROMBIN III ACTIVITY: CPT | Performed by: NURSE PRACTITIONER

## 2020-01-01 PROCEDURE — 86148 ANTI-PHOSPHOLIPID ANTIBODY: CPT | Performed by: NURSE PRACTITIONER

## 2020-01-01 PROCEDURE — 89051 BODY FLUID CELL COUNT: CPT | Performed by: NURSE PRACTITIONER

## 2020-01-01 PROCEDURE — 88112 CYTOPATH CELL ENHANCE TECH: CPT | Performed by: INTERNAL MEDICINE

## 2020-01-01 PROCEDURE — 06H03DZ INSERTION OF INTRALUMINAL DEVICE INTO INFERIOR VENA CAVA, PERCUTANEOUS APPROACH: ICD-10-PCS | Performed by: HOSPITALIST

## 2020-01-01 PROCEDURE — 82274 ASSAY TEST FOR BLOOD FECAL: CPT | Performed by: NURSE PRACTITIONER

## 2020-01-01 PROCEDURE — 99221 1ST HOSP IP/OBS SF/LOW 40: CPT | Performed by: INTERNAL MEDICINE

## 2020-01-01 PROCEDURE — 86900 BLOOD TYPING SEROLOGIC ABO: CPT | Performed by: NURSE PRACTITIONER

## 2020-01-01 PROCEDURE — C1729 CATH, DRAINAGE: HCPCS

## 2020-01-01 PROCEDURE — 87186 SC STD MICRODIL/AGAR DIL: CPT

## 2020-01-01 PROCEDURE — 97166 OT EVAL MOD COMPLEX 45 MIN: CPT

## 2020-01-01 PROCEDURE — 25010000002 PROPOFOL 10 MG/ML EMULSION: Performed by: NURSE ANESTHETIST, CERTIFIED REGISTERED

## 2020-01-01 PROCEDURE — 82746 ASSAY OF FOLIC ACID SERUM: CPT | Performed by: NURSE PRACTITIONER

## 2020-01-01 PROCEDURE — A9540 TC99M MAA: HCPCS | Performed by: INTERNAL MEDICINE

## 2020-01-01 PROCEDURE — 87075 CULTR BACTERIA EXCEPT BLOOD: CPT | Performed by: INTERNAL MEDICINE

## 2020-01-01 PROCEDURE — 82042 OTHER SOURCE ALBUMIN QUAN EA: CPT | Performed by: INTERNAL MEDICINE

## 2020-01-01 PROCEDURE — 86870 RBC ANTIBODY IDENTIFICATION: CPT | Performed by: SURGERY

## 2020-01-01 PROCEDURE — 97530 THERAPEUTIC ACTIVITIES: CPT

## 2020-01-01 PROCEDURE — 86901 BLOOD TYPING SEROLOGIC RH(D): CPT | Performed by: INTERNAL MEDICINE

## 2020-01-01 PROCEDURE — 82550 ASSAY OF CK (CPK): CPT | Performed by: NURSE PRACTITIONER

## 2020-01-01 PROCEDURE — 85670 THROMBIN TIME PLASMA: CPT | Performed by: NURSE PRACTITIONER

## 2020-01-01 PROCEDURE — 0WQF0ZZ REPAIR ABDOMINAL WALL, OPEN APPROACH: ICD-10-PCS | Performed by: SURGERY

## 2020-01-01 PROCEDURE — 92611 MOTION FLUOROSCOPY/SWALLOW: CPT

## 2020-01-01 PROCEDURE — 36415 COLL VENOUS BLD VENIPUNCTURE: CPT | Performed by: NURSE PRACTITIONER

## 2020-01-01 PROCEDURE — 88108 CYTOPATH CONCENTRATE TECH: CPT | Performed by: INTERNAL MEDICINE

## 2020-01-01 PROCEDURE — 86900 BLOOD TYPING SEROLOGIC ABO: CPT

## 2020-01-01 PROCEDURE — 78582 LUNG VENTILAT&PERFUS IMAGING: CPT

## 2020-01-01 PROCEDURE — 84466 ASSAY OF TRANSFERRIN: CPT | Performed by: NURSE PRACTITIONER

## 2020-01-01 PROCEDURE — 25010000002 IOPAMIDOL 61 % SOLUTION: Performed by: INTERNAL MEDICINE

## 2020-01-01 PROCEDURE — 83540 ASSAY OF IRON: CPT | Performed by: NURSE PRACTITIONER

## 2020-01-01 PROCEDURE — 25010000002 HYDROMORPHONE PER 4 MG: Performed by: NURSE PRACTITIONER

## 2020-01-01 PROCEDURE — 87070 CULTURE OTHR SPECIMN AEROBIC: CPT | Performed by: NURSE PRACTITIONER

## 2020-01-01 PROCEDURE — 97163 PT EVAL HIGH COMPLEX 45 MIN: CPT

## 2020-01-01 PROCEDURE — 84165 PROTEIN E-PHORESIS SERUM: CPT | Performed by: NURSE PRACTITIONER

## 2020-01-01 PROCEDURE — 25010000002 DEXAMETHASONE PER 1 MG: Performed by: NURSE ANESTHETIST, CERTIFIED REGISTERED

## 2020-01-01 PROCEDURE — 97535 SELF CARE MNGMENT TRAINING: CPT

## 2020-01-01 PROCEDURE — 84550 ASSAY OF BLOOD/URIC ACID: CPT | Performed by: INTERNAL MEDICINE

## 2020-01-01 PROCEDURE — 85652 RBC SED RATE AUTOMATED: CPT | Performed by: EMERGENCY MEDICINE

## 2020-01-01 PROCEDURE — 88185 FLOWCYTOMETRY/TC ADD-ON: CPT

## 2020-01-01 PROCEDURE — A9552 F18 FDG: HCPCS | Performed by: FAMILY MEDICINE

## 2020-01-01 PROCEDURE — 89051 BODY FLUID CELL COUNT: CPT | Performed by: INTERNAL MEDICINE

## 2020-01-01 PROCEDURE — 93010 ELECTROCARDIOGRAM REPORT: CPT | Performed by: INTERNAL MEDICINE

## 2020-01-01 PROCEDURE — 87116 MYCOBACTERIA CULTURE: CPT | Performed by: INTERNAL MEDICINE

## 2020-01-01 PROCEDURE — 71250 CT THORAX DX C-: CPT

## 2020-01-01 PROCEDURE — 88300 SURGICAL PATH GROSS: CPT | Performed by: FAMILY MEDICINE

## 2020-01-01 PROCEDURE — 73552 X-RAY EXAM OF FEMUR 2/>: CPT

## 2020-01-01 PROCEDURE — 84132 ASSAY OF SERUM POTASSIUM: CPT | Performed by: INTERNAL MEDICINE

## 2020-01-01 PROCEDURE — 87205 SMEAR GRAM STAIN: CPT | Performed by: NURSE PRACTITIONER

## 2020-01-01 PROCEDURE — 0W9G3ZZ DRAINAGE OF PERITONEAL CAVITY, PERCUTANEOUS APPROACH: ICD-10-PCS | Performed by: HOSPITALIST

## 2020-01-01 PROCEDURE — C1769 GUIDE WIRE: HCPCS

## 2020-01-01 PROCEDURE — 99254 IP/OBS CNSLTJ NEW/EST MOD 60: CPT | Performed by: NURSE PRACTITIONER

## 2020-01-01 PROCEDURE — 99222 1ST HOSP IP/OBS MODERATE 55: CPT | Performed by: NURSE PRACTITIONER

## 2020-01-01 PROCEDURE — 0 TECHNETIUM ALBUMIN AGGREGATED: Performed by: INTERNAL MEDICINE

## 2020-01-01 PROCEDURE — 83880 ASSAY OF NATRIURETIC PEPTIDE: CPT | Performed by: INTERNAL MEDICINE

## 2020-01-01 PROCEDURE — 25010000002 PIPERACILLIN SOD-TAZOBACTAM PER 1 G: Performed by: INTERNAL MEDICINE

## 2020-01-01 PROCEDURE — 83010 ASSAY OF HAPTOGLOBIN QUANT: CPT | Performed by: NURSE PRACTITIONER

## 2020-01-01 PROCEDURE — 83986 ASSAY PH BODY FLUID NOS: CPT | Performed by: INTERNAL MEDICINE

## 2020-01-01 PROCEDURE — 85705 THROMBOPLASTIN INHIBITION: CPT | Performed by: NURSE PRACTITIONER

## 2020-01-01 PROCEDURE — 85027 COMPLETE CBC AUTOMATED: CPT

## 2020-01-01 PROCEDURE — 87088 URINE BACTERIA CULTURE: CPT

## 2020-01-01 PROCEDURE — 99222 1ST HOSP IP/OBS MODERATE 55: CPT | Performed by: PSYCHIATRY & NEUROLOGY

## 2020-01-01 PROCEDURE — 81001 URINALYSIS AUTO W/SCOPE: CPT | Performed by: INTERNAL MEDICINE

## 2020-01-01 PROCEDURE — 87086 URINE CULTURE/COLONY COUNT: CPT

## 2020-01-01 PROCEDURE — 85045 AUTOMATED RETICULOCYTE COUNT: CPT | Performed by: NURSE PRACTITIONER

## 2020-01-01 PROCEDURE — 86923 COMPATIBILITY TEST ELECTRIC: CPT

## 2020-01-01 PROCEDURE — 85732 THROMBOPLASTIN TIME PARTIAL: CPT | Performed by: NURSE PRACTITIONER

## 2020-01-01 PROCEDURE — 85025 COMPLETE CBC W/AUTO DIFF WBC: CPT

## 2020-01-01 PROCEDURE — 80048 BASIC METABOLIC PNL TOTAL CA: CPT | Performed by: INTERNAL MEDICINE

## 2020-01-01 PROCEDURE — 82550 ASSAY OF CK (CPK): CPT | Performed by: INTERNAL MEDICINE

## 2020-01-01 PROCEDURE — 49561 PR REPAIR INCISIONAL HERNIA,STRANG: CPT | Performed by: SURGERY

## 2020-01-01 PROCEDURE — 85306 CLOT INHIBIT PROT S FREE: CPT | Performed by: NURSE PRACTITIONER

## 2020-01-01 PROCEDURE — 0W9G3ZX DRAINAGE OF PERITONEAL CAVITY, PERCUTANEOUS APPROACH, DIAGNOSTIC: ICD-10-PCS | Performed by: RADIOLOGY

## 2020-01-01 PROCEDURE — 85007 BL SMEAR W/DIFF WBC COUNT: CPT | Performed by: NURSE PRACTITIONER

## 2020-01-01 PROCEDURE — 85379 FIBRIN DEGRADATION QUANT: CPT | Performed by: INTERNAL MEDICINE

## 2020-01-01 PROCEDURE — 63710000001 DIPHENHYDRAMINE PER 50 MG: Performed by: INTERNAL MEDICINE

## 2020-01-01 PROCEDURE — 81240 F2 GENE: CPT | Performed by: NURSE PRACTITIONER

## 2020-01-01 PROCEDURE — 25010000003 POTASSIUM CHLORIDE 10 MEQ/100ML SOLUTION: Performed by: HOSPITALIST

## 2020-01-01 PROCEDURE — 84484 ASSAY OF TROPONIN QUANT: CPT | Performed by: INTERNAL MEDICINE

## 2020-01-01 PROCEDURE — 87077 CULTURE AEROBIC IDENTIFY: CPT | Performed by: NURSE PRACTITIONER

## 2020-01-01 PROCEDURE — 83615 LACTATE (LD) (LDH) ENZYME: CPT | Performed by: NURSE PRACTITIONER

## 2020-01-01 PROCEDURE — 74230 X-RAY XM SWLNG FUNCJ C+: CPT

## 2020-01-01 PROCEDURE — 86147 CARDIOLIPIN ANTIBODY EA IG: CPT | Performed by: NURSE PRACTITIONER

## 2020-01-01 PROCEDURE — 86870 RBC ANTIBODY IDENTIFICATION: CPT

## 2020-01-01 PROCEDURE — A9538 TC99M PYROPHOSPHATE: HCPCS | Performed by: INTERNAL MEDICINE

## 2020-01-01 PROCEDURE — 97162 PT EVAL MOD COMPLEX 30 MIN: CPT

## 2020-01-01 PROCEDURE — 81241 F5 GENE: CPT | Performed by: NURSE PRACTITIONER

## 2020-01-01 PROCEDURE — 85598 HEXAGNAL PHOSPH PLTLT NEUTRL: CPT | Performed by: NURSE PRACTITIONER

## 2020-01-01 PROCEDURE — 93005 ELECTROCARDIOGRAM TRACING: CPT | Performed by: INTERNAL MEDICINE

## 2020-01-01 PROCEDURE — 86850 RBC ANTIBODY SCREEN: CPT | Performed by: NURSE PRACTITIONER

## 2020-01-01 PROCEDURE — 85384 FIBRINOGEN ACTIVITY: CPT | Performed by: INTERNAL MEDICINE

## 2020-01-01 PROCEDURE — 87015 SPECIMEN INFECT AGNT CONCNTJ: CPT | Performed by: NURSE PRACTITIONER

## 2020-01-01 PROCEDURE — 99238 HOSP IP/OBS DSCHRG MGMT 30/<: CPT | Performed by: HOSPITALIST

## 2020-01-01 PROCEDURE — 86901 BLOOD TYPING SEROLOGIC RH(D): CPT | Performed by: SURGERY

## 2020-01-01 PROCEDURE — 82607 VITAMIN B-12: CPT | Performed by: NURSE PRACTITIONER

## 2020-01-01 PROCEDURE — 87186 SC STD MICRODIL/AGAR DIL: CPT | Performed by: NURSE PRACTITIONER

## 2020-01-01 RX ORDER — SODIUM CHLORIDE 0.9 % (FLUSH) 0.9 %
10 SYRINGE (ML) INJECTION AS NEEDED
Status: DISCONTINUED | OUTPATIENT
Start: 2020-01-01 | End: 2020-01-01 | Stop reason: HOSPADM

## 2020-01-01 RX ORDER — NALOXONE HCL 0.4 MG/ML
0.4 VIAL (ML) INJECTION
Status: DISCONTINUED | OUTPATIENT
Start: 2020-01-01 | End: 2020-01-01 | Stop reason: HOSPADM

## 2020-01-01 RX ORDER — LABETALOL HYDROCHLORIDE 5 MG/ML
5 INJECTION, SOLUTION INTRAVENOUS
Status: DISCONTINUED | OUTPATIENT
Start: 2020-01-01 | End: 2020-01-01 | Stop reason: HOSPADM

## 2020-01-01 RX ORDER — POTASSIUM CHLORIDE 20 MEQ/1
20 TABLET, EXTENDED RELEASE ORAL
Status: DISCONTINUED | OUTPATIENT
Start: 2020-01-01 | End: 2020-01-01

## 2020-01-01 RX ORDER — BUMETANIDE 2 MG/1
2 TABLET ORAL DAILY
Start: 2020-01-01 | End: 2020-01-01

## 2020-01-01 RX ORDER — CALCIUM GLUCONATE 20 MG/ML
1 INJECTION, SOLUTION INTRAVENOUS ONCE
Status: COMPLETED | OUTPATIENT
Start: 2020-01-01 | End: 2020-01-01

## 2020-01-01 RX ORDER — EPHEDRINE SULFATE 50 MG/ML
5 INJECTION, SOLUTION INTRAVENOUS ONCE AS NEEDED
Status: DISCONTINUED | OUTPATIENT
Start: 2020-01-01 | End: 2020-01-01 | Stop reason: HOSPADM

## 2020-01-01 RX ORDER — LEVOTHYROXINE SODIUM 0.07 MG/1
75 TABLET ORAL
Status: DISCONTINUED | OUTPATIENT
Start: 2020-01-01 | End: 2020-01-01 | Stop reason: HOSPADM

## 2020-01-01 RX ORDER — ALBUMIN (HUMAN) 12.5 G/50ML
62.5 SOLUTION INTRAVENOUS ONCE
Status: COMPLETED | OUTPATIENT
Start: 2020-01-01 | End: 2020-01-01

## 2020-01-01 RX ORDER — MAGNESIUM SULFATE 1 G/100ML
1 INJECTION INTRAVENOUS ONCE
Status: COMPLETED | OUTPATIENT
Start: 2020-01-01 | End: 2020-01-01

## 2020-01-01 RX ORDER — ONDANSETRON 4 MG/1
4 TABLET, FILM COATED ORAL EVERY 6 HOURS PRN
Status: DISCONTINUED | OUTPATIENT
Start: 2020-01-01 | End: 2020-01-01 | Stop reason: HOSPADM

## 2020-01-01 RX ORDER — ACETAMINOPHEN 325 MG/1
650 TABLET ORAL EVERY 4 HOURS PRN
Status: DISCONTINUED | OUTPATIENT
Start: 2020-01-01 | End: 2020-01-01 | Stop reason: HOSPADM

## 2020-01-01 RX ORDER — MIDODRINE HYDROCHLORIDE 2.5 MG/1
2.5 TABLET ORAL
Status: DISCONTINUED | OUTPATIENT
Start: 2020-01-01 | End: 2020-01-01

## 2020-01-01 RX ORDER — SODIUM BICARBONATE 650 MG/1
650 TABLET ORAL 3 TIMES DAILY
Status: DISCONTINUED | OUTPATIENT
Start: 2020-01-01 | End: 2020-01-01 | Stop reason: HOSPADM

## 2020-01-01 RX ORDER — DEXTROSE MONOHYDRATE 25 G/50ML
50 INJECTION, SOLUTION INTRAVENOUS ONCE
Status: COMPLETED | OUTPATIENT
Start: 2020-01-01 | End: 2020-01-01

## 2020-01-01 RX ORDER — DEXTROSE AND SODIUM CHLORIDE 5; .9 G/100ML; G/100ML
500 INJECTION, SOLUTION INTRAVENOUS ONCE
Status: COMPLETED | OUTPATIENT
Start: 2020-01-01 | End: 2020-01-01

## 2020-01-01 RX ORDER — SODIUM CHLORIDE 0.9 % (FLUSH) 0.9 %
10 SYRINGE (ML) INJECTION EVERY 12 HOURS SCHEDULED
Status: DISCONTINUED | OUTPATIENT
Start: 2020-01-01 | End: 2020-01-01

## 2020-01-01 RX ORDER — PANTOPRAZOLE SODIUM 40 MG/10ML
80 INJECTION, POWDER, LYOPHILIZED, FOR SOLUTION INTRAVENOUS ONCE
Status: COMPLETED | OUTPATIENT
Start: 2020-01-01 | End: 2020-01-01

## 2020-01-01 RX ORDER — FEBUXOSTAT 40 MG/1
40 TABLET, FILM COATED ORAL DAILY
Status: DISCONTINUED | OUTPATIENT
Start: 2020-01-01 | End: 2020-01-01 | Stop reason: HOSPADM

## 2020-01-01 RX ORDER — MAGNESIUM HYDROXIDE 1200 MG/15ML
LIQUID ORAL AS NEEDED
Status: DISCONTINUED | OUTPATIENT
Start: 2020-01-01 | End: 2020-01-01 | Stop reason: HOSPADM

## 2020-01-01 RX ORDER — BISACODYL 5 MG/1
5 TABLET, DELAYED RELEASE ORAL DAILY PRN
Status: DISCONTINUED | OUTPATIENT
Start: 2020-01-01 | End: 2020-01-01 | Stop reason: HOSPADM

## 2020-01-01 RX ORDER — HYDROCODONE BITARTRATE AND ACETAMINOPHEN 7.5; 325 MG/1; MG/1
1 TABLET ORAL ONCE AS NEEDED
Status: DISCONTINUED | OUTPATIENT
Start: 2020-01-01 | End: 2020-01-01 | Stop reason: HOSPADM

## 2020-01-01 RX ORDER — HYDROCODONE BITARTRATE AND ACETAMINOPHEN 5; 325 MG/1; MG/1
1 TABLET ORAL EVERY 4 HOURS PRN
Status: DISCONTINUED | OUTPATIENT
Start: 2020-01-01 | End: 2020-01-01

## 2020-01-01 RX ORDER — LEVOTHYROXINE SODIUM 0.05 MG/1
50 TABLET ORAL
Status: DISCONTINUED | OUTPATIENT
Start: 2020-01-01 | End: 2020-01-01 | Stop reason: HOSPADM

## 2020-01-01 RX ORDER — ALBUMIN (HUMAN) 12.5 G/50ML
25 SOLUTION INTRAVENOUS EVERY 8 HOURS
Status: COMPLETED | OUTPATIENT
Start: 2020-01-01 | End: 2020-01-01

## 2020-01-01 RX ORDER — FUROSEMIDE 20 MG/1
20 TABLET ORAL 2 TIMES DAILY
Qty: 60 TABLET | Refills: 0 | Status: SHIPPED | OUTPATIENT
Start: 2020-01-01 | End: 2020-01-01 | Stop reason: HOSPADM

## 2020-01-01 RX ORDER — FLUMAZENIL 0.1 MG/ML
0.2 INJECTION INTRAVENOUS AS NEEDED
Status: DISCONTINUED | OUTPATIENT
Start: 2020-01-01 | End: 2020-01-01 | Stop reason: HOSPADM

## 2020-01-01 RX ORDER — LACTULOSE 10 G/15ML
20 SOLUTION ORAL 2 TIMES DAILY
Status: DISCONTINUED | OUTPATIENT
Start: 2020-01-01 | End: 2020-01-01 | Stop reason: HOSPADM

## 2020-01-01 RX ORDER — SPIRONOLACTONE 25 MG/1
25 TABLET ORAL DAILY
Status: DISCONTINUED | OUTPATIENT
Start: 2020-01-01 | End: 2020-01-01

## 2020-01-01 RX ORDER — SODIUM CHLORIDE, SODIUM LACTATE, POTASSIUM CHLORIDE, CALCIUM CHLORIDE 600; 310; 30; 20 MG/100ML; MG/100ML; MG/100ML; MG/100ML
30 INJECTION, SOLUTION INTRAVENOUS CONTINUOUS
Status: CANCELLED | OUTPATIENT
Start: 2020-01-01

## 2020-01-01 RX ORDER — SUCCINYLCHOLINE CHLORIDE 20 MG/ML
INJECTION INTRAMUSCULAR; INTRAVENOUS AS NEEDED
Status: DISCONTINUED | OUTPATIENT
Start: 2020-01-01 | End: 2020-01-01 | Stop reason: SURG

## 2020-01-01 RX ORDER — MORPHINE SULFATE 100 MG/5ML
5 SOLUTION ORAL
Qty: 5 ML | Refills: 0
Start: 2020-01-01

## 2020-01-01 RX ORDER — POTASSIUM CHLORIDE 20 MEQ/1
40 TABLET, EXTENDED RELEASE ORAL EVERY 4 HOURS
Status: DISPENSED | OUTPATIENT
Start: 2020-01-01 | End: 2020-01-01

## 2020-01-01 RX ORDER — ACETAMINOPHEN 325 MG/1
650 TABLET ORAL EVERY 6 HOURS PRN
Status: DISCONTINUED | OUTPATIENT
Start: 2020-01-01 | End: 2020-01-01 | Stop reason: HOSPADM

## 2020-01-01 RX ORDER — GUAIFENESIN 600 MG/1
600 TABLET, EXTENDED RELEASE ORAL 2 TIMES DAILY
Status: DISCONTINUED | OUTPATIENT
Start: 2020-01-01 | End: 2020-01-01 | Stop reason: HOSPADM

## 2020-01-01 RX ORDER — PANTOPRAZOLE SODIUM 40 MG/1
40 TABLET, DELAYED RELEASE ORAL
Status: DISCONTINUED | OUTPATIENT
Start: 2020-01-01 | End: 2020-01-01 | Stop reason: HOSPADM

## 2020-01-01 RX ORDER — HYDROMORPHONE HCL 110MG/55ML
0.5 PATIENT CONTROLLED ANALGESIA SYRINGE INTRAVENOUS
Status: DISCONTINUED | OUTPATIENT
Start: 2020-01-01 | End: 2020-01-01 | Stop reason: HOSPADM

## 2020-01-01 RX ORDER — FEBUXOSTAT 40 MG/1
40 TABLET, FILM COATED ORAL DAILY
Start: 2020-01-01 | End: 2020-01-01 | Stop reason: HOSPADM

## 2020-01-01 RX ORDER — FUROSEMIDE 10 MG/ML
40 INJECTION INTRAMUSCULAR; INTRAVENOUS ONCE
Status: DISCONTINUED | OUTPATIENT
Start: 2020-01-01 | End: 2020-01-01

## 2020-01-01 RX ORDER — MORPHINE SULFATE 2 MG/ML
4 INJECTION, SOLUTION INTRAMUSCULAR; INTRAVENOUS ONCE
Status: DISCONTINUED | OUTPATIENT
Start: 2020-01-01 | End: 2020-01-01 | Stop reason: HOSPADM

## 2020-01-01 RX ORDER — SODIUM CHLORIDE 0.9 % (FLUSH) 0.9 %
10 SYRINGE (ML) INJECTION AS NEEDED
Status: DISCONTINUED | OUTPATIENT
Start: 2020-01-01 | End: 2020-01-01

## 2020-01-01 RX ORDER — LIDOCAINE HYDROCHLORIDE 10 MG/ML
20 INJECTION, SOLUTION INFILTRATION; PERINEURAL ONCE
Status: COMPLETED | OUTPATIENT
Start: 2020-01-01 | End: 2020-01-01

## 2020-01-01 RX ORDER — POTASSIUM CHLORIDE 20 MEQ/1
20 TABLET, EXTENDED RELEASE ORAL
Status: DISCONTINUED | OUTPATIENT
Start: 2020-01-01 | End: 2020-01-01 | Stop reason: HOSPADM

## 2020-01-01 RX ORDER — LIDOCAINE HYDROCHLORIDE 20 MG/ML
INJECTION, SOLUTION INFILTRATION; PERINEURAL AS NEEDED
Status: DISCONTINUED | OUTPATIENT
Start: 2020-01-01 | End: 2020-01-01 | Stop reason: SURG

## 2020-01-01 RX ORDER — HYDROMORPHONE HCL 110MG/55ML
2 PATIENT CONTROLLED ANALGESIA SYRINGE INTRAVENOUS EVERY 4 HOURS PRN
Status: DISCONTINUED | OUTPATIENT
Start: 2020-01-01 | End: 2020-01-01

## 2020-01-01 RX ORDER — ALUMINA, MAGNESIA, AND SIMETHICONE 2400; 2400; 240 MG/30ML; MG/30ML; MG/30ML
15 SUSPENSION ORAL EVERY 6 HOURS PRN
Status: DISCONTINUED | OUTPATIENT
Start: 2020-01-01 | End: 2020-01-01

## 2020-01-01 RX ORDER — BUMETANIDE 2 MG/1
2 TABLET ORAL DAILY
Status: DISCONTINUED | OUTPATIENT
Start: 2020-01-01 | End: 2020-01-01 | Stop reason: HOSPADM

## 2020-01-01 RX ORDER — LORAZEPAM 0.5 MG/1
0.5 TABLET ORAL EVERY 8 HOURS PRN
Status: DISCONTINUED | OUTPATIENT
Start: 2020-01-01 | End: 2020-01-01

## 2020-01-01 RX ORDER — ONDANSETRON 2 MG/ML
4 INJECTION INTRAMUSCULAR; INTRAVENOUS ONCE
Status: COMPLETED | OUTPATIENT
Start: 2020-01-01 | End: 2020-01-01

## 2020-01-01 RX ORDER — SODIUM POLYSTYRENE SULFONATE 15 G/60ML
15 SUSPENSION ORAL; RECTAL ONCE
Status: COMPLETED | OUTPATIENT
Start: 2020-01-01 | End: 2020-01-01

## 2020-01-01 RX ORDER — MIDODRINE HYDROCHLORIDE 5 MG/1
10 TABLET ORAL
Status: DISCONTINUED | OUTPATIENT
Start: 2020-01-01 | End: 2020-01-01 | Stop reason: HOSPADM

## 2020-01-01 RX ORDER — MIDAZOLAM HYDROCHLORIDE 1 MG/ML
2 INJECTION INTRAMUSCULAR; INTRAVENOUS
Status: DISCONTINUED | OUTPATIENT
Start: 2020-01-01 | End: 2020-01-01 | Stop reason: HOSPADM

## 2020-01-01 RX ORDER — BUMETANIDE 1 MG/1
1 TABLET ORAL
Status: DISCONTINUED | OUTPATIENT
Start: 2020-01-01 | End: 2020-01-01 | Stop reason: HOSPADM

## 2020-01-01 RX ORDER — SODIUM CHLORIDE 0.9 % (FLUSH) 0.9 %
3 SYRINGE (ML) INJECTION EVERY 12 HOURS SCHEDULED
Status: DISCONTINUED | OUTPATIENT
Start: 2020-01-01 | End: 2020-01-01 | Stop reason: HOSPADM

## 2020-01-01 RX ORDER — ACETAMINOPHEN 650 MG/1
650 SUPPOSITORY RECTAL EVERY 4 HOURS PRN
Status: DISCONTINUED | OUTPATIENT
Start: 2020-01-01 | End: 2020-01-01 | Stop reason: HOSPADM

## 2020-01-01 RX ORDER — ECHINACEA PURPUREA EXTRACT 125 MG
1 TABLET ORAL AS NEEDED
Status: DISCONTINUED | OUTPATIENT
Start: 2020-01-01 | End: 2020-01-01 | Stop reason: HOSPADM

## 2020-01-01 RX ORDER — DIPHENHYDRAMINE HYDROCHLORIDE 50 MG/ML
12.5 INJECTION INTRAMUSCULAR; INTRAVENOUS ONCE
Status: COMPLETED | OUTPATIENT
Start: 2020-01-01 | End: 2020-01-01

## 2020-01-01 RX ORDER — ACETAMINOPHEN 160 MG/5ML
650 SOLUTION ORAL ONCE
Status: COMPLETED | OUTPATIENT
Start: 2020-01-01 | End: 2020-01-01

## 2020-01-01 RX ORDER — NICOTINE POLACRILEX 4 MG
15 LOZENGE BUCCAL ONCE
Status: COMPLETED | OUTPATIENT
Start: 2020-01-01 | End: 2020-01-01

## 2020-01-01 RX ORDER — ONDANSETRON 2 MG/ML
4 INJECTION INTRAMUSCULAR; INTRAVENOUS EVERY 6 HOURS PRN
Status: DISCONTINUED | OUTPATIENT
Start: 2020-01-01 | End: 2020-01-01 | Stop reason: HOSPADM

## 2020-01-01 RX ORDER — MORPHINE SULFATE 4 MG/ML
1 INJECTION, SOLUTION INTRAMUSCULAR; INTRAVENOUS
Status: DISCONTINUED | OUTPATIENT
Start: 2020-01-01 | End: 2020-01-01 | Stop reason: HOSPADM

## 2020-01-01 RX ORDER — PROPOFOL 10 MG/ML
VIAL (ML) INTRAVENOUS AS NEEDED
Status: DISCONTINUED | OUTPATIENT
Start: 2020-01-01 | End: 2020-01-01 | Stop reason: SURG

## 2020-01-01 RX ORDER — ALBUMIN (HUMAN) 12.5 G/50ML
87.5 SOLUTION INTRAVENOUS ONCE
Status: COMPLETED | OUTPATIENT
Start: 2020-01-01 | End: 2020-01-01

## 2020-01-01 RX ORDER — SODIUM CHLORIDE, SODIUM LACTATE, POTASSIUM CHLORIDE, CALCIUM CHLORIDE 600; 310; 30; 20 MG/100ML; MG/100ML; MG/100ML; MG/100ML
9 INJECTION, SOLUTION INTRAVENOUS CONTINUOUS
Status: DISCONTINUED | OUTPATIENT
Start: 2020-01-01 | End: 2020-01-01

## 2020-01-01 RX ORDER — SPIRONOLACTONE 25 MG/1
25 TABLET ORAL DAILY
COMMUNITY
End: 2020-01-01

## 2020-01-01 RX ORDER — DEXTROMETHORPHAN HYDROBROMIDE AND PROMETHAZINE HYDROCHLORIDE 15; 6.25 MG/5ML; MG/5ML
2.5-5 SYRUP ORAL 4 TIMES DAILY PRN
Qty: 180 ML | Refills: 0 | Status: ON HOLD | OUTPATIENT
Start: 2020-01-01 | End: 2020-01-01

## 2020-01-01 RX ORDER — LACTULOSE 10 G/15ML
20 SOLUTION ORAL 2 TIMES DAILY
Start: 2020-01-01 | End: 2020-01-01 | Stop reason: HOSPADM

## 2020-01-01 RX ORDER — POTASSIUM CHLORIDE 20 MEQ/1
20 TABLET, EXTENDED RELEASE ORAL 2 TIMES DAILY
Status: DISCONTINUED | OUTPATIENT
Start: 2020-01-01 | End: 2020-01-01

## 2020-01-01 RX ORDER — NITROGLYCERIN 0.4 MG/1
0.4 TABLET SUBLINGUAL
Status: DISCONTINUED | OUTPATIENT
Start: 2020-01-01 | End: 2020-01-01 | Stop reason: HOSPADM

## 2020-01-01 RX ORDER — FUROSEMIDE 10 MG/ML
40 INJECTION INTRAMUSCULAR; INTRAVENOUS 3 TIMES DAILY
Status: DISCONTINUED | OUTPATIENT
Start: 2020-01-01 | End: 2020-01-01

## 2020-01-01 RX ORDER — HYDROMORPHONE HYDROCHLORIDE 1 MG/ML
0.25 INJECTION, SOLUTION INTRAMUSCULAR; INTRAVENOUS; SUBCUTANEOUS
Status: DISCONTINUED | OUTPATIENT
Start: 2020-01-01 | End: 2020-01-01 | Stop reason: HOSPADM

## 2020-01-01 RX ORDER — ALBUMIN (HUMAN) 12.5 G/50ML
50 SOLUTION INTRAVENOUS ONCE
Status: COMPLETED | OUTPATIENT
Start: 2020-01-01 | End: 2020-01-01

## 2020-01-01 RX ORDER — FENTANYL CITRATE 50 UG/ML
INJECTION, SOLUTION INTRAMUSCULAR; INTRAVENOUS AS NEEDED
Status: DISCONTINUED | OUTPATIENT
Start: 2020-01-01 | End: 2020-01-01 | Stop reason: SURG

## 2020-01-01 RX ORDER — ALBUMIN (HUMAN) 12.5 G/50ML
25 SOLUTION INTRAVENOUS EVERY 8 HOURS
Status: DISPENSED | OUTPATIENT
Start: 2020-01-01 | End: 2020-01-01

## 2020-01-01 RX ORDER — CALCIUM GLUCONATE 20 MG/ML
1 INJECTION, SOLUTION INTRAVENOUS EVERY 12 HOURS
Status: DISCONTINUED | OUTPATIENT
Start: 2020-01-01 | End: 2020-01-01

## 2020-01-01 RX ORDER — FUROSEMIDE 10 MG/ML
20 INJECTION INTRAMUSCULAR; INTRAVENOUS EVERY 8 HOURS
Status: COMPLETED | OUTPATIENT
Start: 2020-01-01 | End: 2020-01-01

## 2020-01-01 RX ORDER — SULFAMETHOXAZOLE AND TRIMETHOPRIM 800; 160 MG/1; MG/1
TABLET ORAL
COMMUNITY
Start: 2020-01-01 | End: 2020-01-01

## 2020-01-01 RX ORDER — HEPARIN SODIUM 5000 [USP'U]/ML
5000 INJECTION, SOLUTION INTRAVENOUS; SUBCUTANEOUS EVERY 12 HOURS SCHEDULED
Status: DISCONTINUED | OUTPATIENT
Start: 2020-01-01 | End: 2020-01-01

## 2020-01-01 RX ORDER — MIDODRINE HYDROCHLORIDE 2.5 MG/1
2.5 TABLET ORAL 3 TIMES DAILY
COMMUNITY
End: 2020-01-01 | Stop reason: HOSPADM

## 2020-01-01 RX ORDER — ONDANSETRON 2 MG/ML
4 INJECTION INTRAMUSCULAR; INTRAVENOUS ONCE AS NEEDED
Status: COMPLETED | OUTPATIENT
Start: 2020-01-01 | End: 2020-01-01

## 2020-01-01 RX ORDER — FLUTICASONE PROPIONATE 50 MCG
2 SPRAY, SUSPENSION (ML) NASAL DAILY
Qty: 15.8 ML | Refills: 0 | Status: SHIPPED | OUTPATIENT
Start: 2020-01-01 | End: 2020-01-01 | Stop reason: HOSPADM

## 2020-01-01 RX ORDER — SODIUM CHLORIDE 9 MG/ML
60 INJECTION, SOLUTION INTRAVENOUS CONTINUOUS
Status: DISCONTINUED | OUTPATIENT
Start: 2020-01-01 | End: 2020-01-01

## 2020-01-01 RX ORDER — LEVOTHYROXINE SODIUM 0.05 MG/1
50 TABLET ORAL
Status: DISCONTINUED | OUTPATIENT
Start: 2020-01-01 | End: 2020-01-01

## 2020-01-01 RX ORDER — NYSTATIN 100000 U/G
CREAM TOPICAL EVERY 8 HOURS PRN
Status: DISCONTINUED | OUTPATIENT
Start: 2020-01-01 | End: 2020-01-01 | Stop reason: HOSPADM

## 2020-01-01 RX ORDER — LIDOCAINE HYDROCHLORIDE 10 MG/ML
0.5 INJECTION, SOLUTION EPIDURAL; INFILTRATION; INTRACAUDAL; PERINEURAL ONCE AS NEEDED
Status: DISCONTINUED | OUTPATIENT
Start: 2020-01-01 | End: 2020-01-01 | Stop reason: HOSPADM

## 2020-01-01 RX ORDER — DIPHENHYDRAMINE HYDROCHLORIDE 50 MG/ML
12.5 INJECTION INTRAMUSCULAR; INTRAVENOUS
Status: DISCONTINUED | OUTPATIENT
Start: 2020-01-01 | End: 2020-01-01 | Stop reason: HOSPADM

## 2020-01-01 RX ORDER — HYDROMORPHONE HYDROCHLORIDE 1 MG/ML
0.5 INJECTION, SOLUTION INTRAMUSCULAR; INTRAVENOUS; SUBCUTANEOUS
Status: DISCONTINUED | OUTPATIENT
Start: 2020-01-01 | End: 2020-01-01

## 2020-01-01 RX ORDER — LEVOTHYROXINE SODIUM 0.05 MG/1
50 TABLET ORAL DAILY
Start: 2020-01-01 | End: 2020-01-01 | Stop reason: HOSPADM

## 2020-01-01 RX ORDER — ONDANSETRON 2 MG/ML
4 INJECTION INTRAMUSCULAR; INTRAVENOUS ONCE
Status: DISCONTINUED | OUTPATIENT
Start: 2020-01-01 | End: 2020-01-01 | Stop reason: HOSPADM

## 2020-01-01 RX ORDER — PROMETHAZINE HYDROCHLORIDE 25 MG/1
25 SUPPOSITORY RECTAL ONCE AS NEEDED
Status: DISCONTINUED | OUTPATIENT
Start: 2020-01-01 | End: 2020-01-01 | Stop reason: HOSPADM

## 2020-01-01 RX ORDER — ALBUMIN (HUMAN) 12.5 G/50ML
75 SOLUTION INTRAVENOUS ONCE
Status: COMPLETED | OUTPATIENT
Start: 2020-01-01 | End: 2020-01-01

## 2020-01-01 RX ORDER — BISACODYL 10 MG
10 SUPPOSITORY, RECTAL RECTAL DAILY PRN
Status: DISCONTINUED | OUTPATIENT
Start: 2020-01-01 | End: 2020-01-01 | Stop reason: HOSPADM

## 2020-01-01 RX ORDER — POTASSIUM CHLORIDE 20 MEQ/1
40 TABLET, EXTENDED RELEASE ORAL
Status: DISCONTINUED | OUTPATIENT
Start: 2020-01-01 | End: 2020-01-01

## 2020-01-01 RX ORDER — SODIUM CHLORIDE 0.9 % (FLUSH) 0.9 %
10 SYRINGE (ML) INJECTION EVERY 12 HOURS SCHEDULED
Status: DISCONTINUED | OUTPATIENT
Start: 2020-01-01 | End: 2020-01-01 | Stop reason: HOSPADM

## 2020-01-01 RX ORDER — SODIUM CHLORIDE 9 MG/ML
100 INJECTION, SOLUTION INTRAVENOUS CONTINUOUS
Status: DISCONTINUED | OUTPATIENT
Start: 2020-01-01 | End: 2020-01-01

## 2020-01-01 RX ORDER — POTASSIUM CHLORIDE 20 MEQ/1
20 TABLET, EXTENDED RELEASE ORAL 2 TIMES DAILY
Qty: 14 TABLET | Refills: 0 | Status: SHIPPED | OUTPATIENT
Start: 2020-01-01 | End: 2020-01-01

## 2020-01-01 RX ORDER — FUROSEMIDE 40 MG/1
40 TABLET ORAL ONCE
Status: COMPLETED | OUTPATIENT
Start: 2020-01-01 | End: 2020-01-01

## 2020-01-01 RX ORDER — HEPARIN SODIUM 10000 [USP'U]/100ML
18 INJECTION, SOLUTION INTRAVENOUS
Status: DISCONTINUED | OUTPATIENT
Start: 2020-01-01 | End: 2020-01-01

## 2020-01-01 RX ORDER — POTASSIUM CHLORIDE 750 MG/1
40 CAPSULE, EXTENDED RELEASE ORAL ONCE
Status: COMPLETED | OUTPATIENT
Start: 2020-01-01 | End: 2020-01-01

## 2020-01-01 RX ORDER — FUROSEMIDE 10 MG/ML
20 INJECTION INTRAMUSCULAR; INTRAVENOUS ONCE
Status: COMPLETED | OUTPATIENT
Start: 2020-01-01 | End: 2020-01-01

## 2020-01-01 RX ORDER — CEFDINIR 300 MG/1
300 CAPSULE ORAL
Status: DISCONTINUED | OUTPATIENT
Start: 2020-01-01 | End: 2020-01-01 | Stop reason: HOSPADM

## 2020-01-01 RX ORDER — LACTULOSE 10 G/15ML
20 SOLUTION ORAL 4 TIMES DAILY
Status: DISCONTINUED | OUTPATIENT
Start: 2020-01-01 | End: 2020-01-01

## 2020-01-01 RX ORDER — SODIUM CHLORIDE 9 MG/ML
75 INJECTION, SOLUTION INTRAVENOUS CONTINUOUS
Status: DISCONTINUED | OUTPATIENT
Start: 2020-01-01 | End: 2020-01-01

## 2020-01-01 RX ORDER — MIDODRINE HYDROCHLORIDE 5 MG/1
2.5 TABLET ORAL 3 TIMES DAILY
Status: DISCONTINUED | OUTPATIENT
Start: 2020-01-01 | End: 2020-01-01

## 2020-01-01 RX ORDER — ALBUMIN (HUMAN) 12.5 G/50ML
37.5 SOLUTION INTRAVENOUS ONCE
Status: COMPLETED | OUTPATIENT
Start: 2020-01-01 | End: 2020-01-01

## 2020-01-01 RX ORDER — FENTANYL CITRATE 50 UG/ML
50 INJECTION, SOLUTION INTRAMUSCULAR; INTRAVENOUS
Status: DISCONTINUED | OUTPATIENT
Start: 2020-01-01 | End: 2020-01-01 | Stop reason: HOSPADM

## 2020-01-01 RX ORDER — FLUTICASONE PROPIONATE 50 MCG
2 SPRAY, SUSPENSION (ML) NASAL DAILY
Status: DISCONTINUED | OUTPATIENT
Start: 2020-01-01 | End: 2020-01-01

## 2020-01-01 RX ORDER — HYDROXYZINE HYDROCHLORIDE 25 MG/1
25 TABLET, FILM COATED ORAL 3 TIMES DAILY PRN
Status: DISCONTINUED | OUTPATIENT
Start: 2020-01-01 | End: 2020-01-01 | Stop reason: HOSPADM

## 2020-01-01 RX ORDER — FUROSEMIDE 20 MG/1
20 TABLET ORAL
Status: DISCONTINUED | OUTPATIENT
Start: 2020-01-01 | End: 2020-01-01

## 2020-01-01 RX ORDER — ALBUMIN (HUMAN) 12.5 G/50ML
12.5 SOLUTION INTRAVENOUS DAILY PRN
Status: DISCONTINUED | OUTPATIENT
Start: 2020-01-01 | End: 2020-01-01 | Stop reason: HOSPADM

## 2020-01-01 RX ORDER — IBUPROFEN 200 MG
TABLET ORAL EVERY 12 HOURS SCHEDULED
Status: DISCONTINUED | OUTPATIENT
Start: 2020-01-01 | End: 2020-01-01 | Stop reason: CLARIF

## 2020-01-01 RX ORDER — ACETAMINOPHEN 160 MG/5ML
650 SOLUTION ORAL EVERY 4 HOURS PRN
Status: DISCONTINUED | OUTPATIENT
Start: 2020-01-01 | End: 2020-01-01 | Stop reason: HOSPADM

## 2020-01-01 RX ORDER — PROMETHAZINE HYDROCHLORIDE 25 MG/ML
6.25 INJECTION, SOLUTION INTRAMUSCULAR; INTRAVENOUS
Status: DISCONTINUED | OUTPATIENT
Start: 2020-01-01 | End: 2020-01-01 | Stop reason: HOSPADM

## 2020-01-01 RX ORDER — CHOLECALCIFEROL (VITAMIN D3) 125 MCG
5 CAPSULE ORAL NIGHTLY PRN
Status: DISCONTINUED | OUTPATIENT
Start: 2020-01-01 | End: 2020-01-01 | Stop reason: HOSPADM

## 2020-01-01 RX ORDER — OXYCODONE AND ACETAMINOPHEN 7.5; 325 MG/1; MG/1
1 TABLET ORAL ONCE AS NEEDED
Status: DISCONTINUED | OUTPATIENT
Start: 2020-01-01 | End: 2020-01-01 | Stop reason: HOSPADM

## 2020-01-01 RX ORDER — ALBUMIN (HUMAN) 12.5 G/50ML
12.5 SOLUTION INTRAVENOUS ONCE
Status: COMPLETED | OUTPATIENT
Start: 2020-01-01 | End: 2020-01-01

## 2020-01-01 RX ORDER — ALBUMIN (HUMAN) 12.5 G/50ML
100 SOLUTION INTRAVENOUS ONCE
Status: COMPLETED | OUTPATIENT
Start: 2020-01-01 | End: 2020-01-01

## 2020-01-01 RX ORDER — POTASSIUM CHLORIDE 7.45 MG/ML
10 INJECTION INTRAVENOUS ONCE
Status: COMPLETED | OUTPATIENT
Start: 2020-01-01 | End: 2020-01-01

## 2020-01-01 RX ORDER — ACETAMINOPHEN 325 MG/1
650 TABLET ORAL ONCE AS NEEDED
Status: DISCONTINUED | OUTPATIENT
Start: 2020-01-01 | End: 2020-01-01 | Stop reason: HOSPADM

## 2020-01-01 RX ORDER — DEXAMETHASONE SODIUM PHOSPHATE 10 MG/ML
INJECTION INTRAMUSCULAR; INTRAVENOUS AS NEEDED
Status: DISCONTINUED | OUTPATIENT
Start: 2020-01-01 | End: 2020-01-01 | Stop reason: SURG

## 2020-01-01 RX ORDER — LACTULOSE 10 G/15ML
10 SOLUTION ORAL 4 TIMES DAILY
Status: DISCONTINUED | OUTPATIENT
Start: 2020-01-01 | End: 2020-01-01 | Stop reason: HOSPADM

## 2020-01-01 RX ORDER — SODIUM BICARBONATE 650 MG/1
650 TABLET ORAL DAILY
Status: DISCONTINUED | OUTPATIENT
Start: 2020-01-01 | End: 2020-01-01

## 2020-01-01 RX ORDER — ALBUMIN (HUMAN) 12.5 G/50ML
112.5 SOLUTION INTRAVENOUS ONCE
Status: COMPLETED | OUTPATIENT
Start: 2020-01-01 | End: 2020-01-01

## 2020-01-01 RX ORDER — FENTANYL CITRATE 50 UG/ML
25 INJECTION, SOLUTION INTRAMUSCULAR; INTRAVENOUS
Status: DISCONTINUED | OUTPATIENT
Start: 2020-01-01 | End: 2020-01-01

## 2020-01-01 RX ORDER — FLUTICASONE PROPIONATE 50 MCG
2 SPRAY, SUSPENSION (ML) NASAL DAILY
Status: DISCONTINUED | OUTPATIENT
Start: 2020-01-01 | End: 2020-01-01 | Stop reason: HOSPADM

## 2020-01-01 RX ORDER — MIDODRINE HYDROCHLORIDE 10 MG/1
10 TABLET ORAL
Start: 2020-01-01 | End: 2020-01-01 | Stop reason: HOSPADM

## 2020-01-01 RX ORDER — MIDODRINE HYDROCHLORIDE 5 MG/1
10 TABLET ORAL 3 TIMES DAILY
Status: DISCONTINUED | OUTPATIENT
Start: 2020-01-01 | End: 2020-01-01 | Stop reason: HOSPADM

## 2020-01-01 RX ORDER — DIPHENHYDRAMINE HCL 25 MG
25 TABLET ORAL EVERY 6 HOURS PRN
Status: DISCONTINUED | OUTPATIENT
Start: 2020-01-01 | End: 2020-01-01 | Stop reason: HOSPADM

## 2020-01-01 RX ORDER — SODIUM CHLORIDE 0.9 % (FLUSH) 0.9 %
3-10 SYRINGE (ML) INJECTION AS NEEDED
Status: DISCONTINUED | OUTPATIENT
Start: 2020-01-01 | End: 2020-01-01 | Stop reason: HOSPADM

## 2020-01-01 RX ORDER — MIDAZOLAM HYDROCHLORIDE 1 MG/ML
1 INJECTION INTRAMUSCULAR; INTRAVENOUS
Status: DISCONTINUED | OUTPATIENT
Start: 2020-01-01 | End: 2020-01-01 | Stop reason: HOSPADM

## 2020-01-01 RX ORDER — CEPHALEXIN 500 MG/1
500 CAPSULE ORAL 3 TIMES DAILY
Qty: 21 CAPSULE | Refills: 0 | Status: SHIPPED | OUTPATIENT
Start: 2020-01-01 | End: 2020-01-01 | Stop reason: HOSPADM

## 2020-01-01 RX ORDER — ONDANSETRON 4 MG/1
4 TABLET, ORALLY DISINTEGRATING ORAL EVERY 8 HOURS PRN
Qty: 30 TABLET | Refills: 0 | Status: SHIPPED | OUTPATIENT
Start: 2020-01-01 | End: 2020-01-01 | Stop reason: HOSPADM

## 2020-01-01 RX ORDER — ROCURONIUM BROMIDE 10 MG/ML
INJECTION, SOLUTION INTRAVENOUS AS NEEDED
Status: DISCONTINUED | OUTPATIENT
Start: 2020-01-01 | End: 2020-01-01 | Stop reason: SURG

## 2020-01-01 RX ORDER — PROMETHAZINE HYDROCHLORIDE 25 MG/1
25 TABLET ORAL ONCE AS NEEDED
Status: DISCONTINUED | OUTPATIENT
Start: 2020-01-01 | End: 2020-01-01 | Stop reason: HOSPADM

## 2020-01-01 RX ORDER — MULTIVITAMIN,THERAPEUTIC
1 TABLET ORAL DAILY
Status: DISCONTINUED | OUTPATIENT
Start: 2020-01-01 | End: 2020-01-01 | Stop reason: HOSPADM

## 2020-01-01 RX ORDER — POTASSIUM CHLORIDE 20 MEQ/1
20 TABLET, EXTENDED RELEASE ORAL 2 TIMES DAILY
Qty: 14 TABLET | Refills: 0 | Status: ON HOLD | OUTPATIENT
Start: 2020-01-01 | End: 2020-01-01

## 2020-01-01 RX ORDER — POTASSIUM CHLORIDE 20 MEQ/1
40 TABLET, EXTENDED RELEASE ORAL DAILY
Status: DISCONTINUED | OUTPATIENT
Start: 2020-01-01 | End: 2020-01-01

## 2020-01-01 RX ORDER — ALBUMIN (HUMAN) 12.5 G/50ML
25 SOLUTION INTRAVENOUS EVERY 8 HOURS
Status: DISCONTINUED | OUTPATIENT
Start: 2020-01-01 | End: 2020-01-01 | Stop reason: HOSPADM

## 2020-01-01 RX ORDER — CALCIUM GLUCONATE 20 MG/ML
1 INJECTION, SOLUTION INTRAVENOUS EVERY 12 HOURS
Status: COMPLETED | OUTPATIENT
Start: 2020-01-01 | End: 2020-01-01

## 2020-01-01 RX ORDER — ONDANSETRON 4 MG/1
4 TABLET, ORALLY DISINTEGRATING ORAL EVERY 8 HOURS PRN
Status: DISCONTINUED | OUTPATIENT
Start: 2020-01-01 | End: 2020-01-01 | Stop reason: HOSPADM

## 2020-01-01 RX ORDER — BUMETANIDE 1 MG/1
1 TABLET ORAL 2 TIMES DAILY
Qty: 60 TABLET | Refills: 0 | Status: SHIPPED | OUTPATIENT
Start: 2020-01-01 | End: 2020-01-01 | Stop reason: HOSPADM

## 2020-01-01 RX ORDER — PANTOPRAZOLE SODIUM 40 MG/1
40 TABLET, DELAYED RELEASE ORAL
Start: 2020-01-01 | End: 2020-01-01 | Stop reason: HOSPADM

## 2020-01-01 RX ORDER — MORPHINE SULFATE 2 MG/ML
1 INJECTION, SOLUTION INTRAMUSCULAR; INTRAVENOUS EVERY 4 HOURS PRN
Status: DISCONTINUED | OUTPATIENT
Start: 2020-01-01 | End: 2020-01-01

## 2020-01-01 RX ORDER — HYDRALAZINE HYDROCHLORIDE 20 MG/ML
5 INJECTION INTRAMUSCULAR; INTRAVENOUS
Status: DISCONTINUED | OUTPATIENT
Start: 2020-01-01 | End: 2020-01-01 | Stop reason: HOSPADM

## 2020-01-01 RX ORDER — ASCORBIC ACID 500 MG
250 TABLET ORAL DAILY
Status: DISCONTINUED | OUTPATIENT
Start: 2020-01-01 | End: 2020-01-01 | Stop reason: HOSPADM

## 2020-01-01 RX ORDER — GUAIFENESIN 600 MG/1
600 TABLET, EXTENDED RELEASE ORAL 2 TIMES DAILY
Start: 2020-01-01 | End: 2020-01-01 | Stop reason: HOSPADM

## 2020-01-01 RX ORDER — PROMETHAZINE HYDROCHLORIDE 25 MG/ML
12.5 INJECTION, SOLUTION INTRAMUSCULAR; INTRAVENOUS ONCE AS NEEDED
Status: DISCONTINUED | OUTPATIENT
Start: 2020-01-01 | End: 2020-01-01 | Stop reason: HOSPADM

## 2020-01-01 RX ORDER — NALOXONE HCL 0.4 MG/ML
0.2 VIAL (ML) INJECTION AS NEEDED
Status: DISCONTINUED | OUTPATIENT
Start: 2020-01-01 | End: 2020-01-01 | Stop reason: HOSPADM

## 2020-01-01 RX ORDER — SODIUM BICARBONATE 650 MG/1
650 TABLET ORAL 3 TIMES DAILY
Start: 2020-01-01 | End: 2020-01-01 | Stop reason: HOSPADM

## 2020-01-01 RX ORDER — DIPHENHYDRAMINE HCL 25 MG
25 CAPSULE ORAL
Status: DISCONTINUED | OUTPATIENT
Start: 2020-01-01 | End: 2020-01-01 | Stop reason: HOSPADM

## 2020-01-01 RX ORDER — PROCHLORPERAZINE EDISYLATE 5 MG/ML
5 INJECTION INTRAMUSCULAR; INTRAVENOUS EVERY 6 HOURS PRN
Status: DISCONTINUED | OUTPATIENT
Start: 2020-01-01 | End: 2020-01-01 | Stop reason: HOSPADM

## 2020-01-01 RX ADMIN — MIDODRINE HYDROCHLORIDE 2.5 MG: 2.5 TABLET ORAL at 17:42

## 2020-01-01 RX ADMIN — SODIUM CHLORIDE 500 ML: 9 INJECTION, SOLUTION INTRAVENOUS at 01:13

## 2020-01-01 RX ADMIN — MUPIROCIN: 20 OINTMENT TOPICAL at 08:20

## 2020-01-01 RX ADMIN — MIDODRINE HYDROCHLORIDE 2.5 MG: 2.5 TABLET ORAL at 08:20

## 2020-01-01 RX ADMIN — MORPHINE SULFATE 1 MG: 4 INJECTION INTRAVENOUS at 12:25

## 2020-01-01 RX ADMIN — BISACODYL 5 MG: 5 TABLET ORAL at 03:14

## 2020-01-01 RX ADMIN — LACTULOSE 10 G: 10 SOLUTION ORAL at 20:55

## 2020-01-01 RX ADMIN — LACTULOSE 10 G: 10 SOLUTION ORAL at 17:27

## 2020-01-01 RX ADMIN — CALCIUM GLUCONATE 1 G: 98 INJECTION, SOLUTION INTRAVENOUS at 17:20

## 2020-01-01 RX ADMIN — DEXTROSE MONOHYDRATE AND SODIUM CHLORIDE 500 ML: 5; .9 INJECTION, SOLUTION INTRAVENOUS at 14:32

## 2020-01-01 RX ADMIN — DEXAMETHASONE SODIUM PHOSPHATE 8 MG: 10 INJECTION INTRAMUSCULAR; INTRAVENOUS at 14:50

## 2020-01-01 RX ADMIN — MIDODRINE HYDROCHLORIDE 10 MG: 5 TABLET ORAL at 11:34

## 2020-01-01 RX ADMIN — MUPIROCIN: 20 OINTMENT TOPICAL at 08:48

## 2020-01-01 RX ADMIN — LACTULOSE 10 G: 10 SOLUTION ORAL at 12:52

## 2020-01-01 RX ADMIN — GUAIFENESIN 600 MG: 600 TABLET, EXTENDED RELEASE ORAL at 08:06

## 2020-01-01 RX ADMIN — PANTOPRAZOLE SODIUM 80 MG: 40 INJECTION, POWDER, FOR SOLUTION INTRAVENOUS at 14:37

## 2020-01-01 RX ADMIN — LEVOTHYROXINE SODIUM 50 MCG: 50 TABLET ORAL at 06:37

## 2020-01-01 RX ADMIN — SODIUM CHLORIDE, PRESERVATIVE FREE 10 ML: 5 INJECTION INTRAVENOUS at 08:21

## 2020-01-01 RX ADMIN — POTASSIUM CHLORIDE 40 MEQ: 750 CAPSULE, EXTENDED RELEASE ORAL at 19:03

## 2020-01-01 RX ADMIN — OXYCODONE HYDROCHLORIDE AND ACETAMINOPHEN 250 MG: 500 TABLET ORAL at 09:06

## 2020-01-01 RX ADMIN — MORPHINE SULFATE 1 MG: 2 INJECTION, SOLUTION INTRAMUSCULAR; INTRAVENOUS at 02:53

## 2020-01-01 RX ADMIN — MIDODRINE HYDROCHLORIDE 10 MG: 5 TABLET ORAL at 21:41

## 2020-01-01 RX ADMIN — ONDANSETRON 4 MG: 2 INJECTION INTRAMUSCULAR; INTRAVENOUS at 11:11

## 2020-01-01 RX ADMIN — MUPIROCIN: 20 OINTMENT TOPICAL at 16:49

## 2020-01-01 RX ADMIN — HYDROCORTISONE ACETATE PRAMOXINE HCL: 2.5; 1 CREAM TOPICAL at 20:55

## 2020-01-01 RX ADMIN — SODIUM CHLORIDE, PRESERVATIVE FREE 10 ML: 5 INJECTION INTRAVENOUS at 20:00

## 2020-01-01 RX ADMIN — ACETAMINOPHEN 650 MG: 325 TABLET ORAL at 12:06

## 2020-01-01 RX ADMIN — LACTULOSE 10 G: 10 SOLUTION ORAL at 09:44

## 2020-01-01 RX ADMIN — HYDROCORTISONE ACETATE PRAMOXINE HCL: 2.5; 1 CREAM TOPICAL at 20:03

## 2020-01-01 RX ADMIN — SODIUM BICARBONATE 650 MG: 650 TABLET ORAL at 21:58

## 2020-01-01 RX ADMIN — PANTOPRAZOLE SODIUM 40 MG: 40 TABLET, DELAYED RELEASE ORAL at 06:35

## 2020-01-01 RX ADMIN — SODIUM CHLORIDE 8 MG/HR: 900 INJECTION INTRAVENOUS at 11:28

## 2020-01-01 RX ADMIN — MUPIROCIN: 20 OINTMENT TOPICAL at 09:07

## 2020-01-01 RX ADMIN — SODIUM BICARBONATE 650 MG TABLET 650 MG: at 08:35

## 2020-01-01 RX ADMIN — SODIUM BICARBONATE 650 MG: 650 TABLET ORAL at 16:58

## 2020-01-01 RX ADMIN — THERA TABS 1 TABLET: TAB at 09:08

## 2020-01-01 RX ADMIN — HEPARIN SODIUM 5000 UNITS: 5000 INJECTION INTRAVENOUS; SUBCUTANEOUS at 23:02

## 2020-01-01 RX ADMIN — MIDODRINE HYDROCHLORIDE 10 MG: 5 TABLET ORAL at 11:40

## 2020-01-01 RX ADMIN — MIDODRINE HYDROCHLORIDE 10 MG: 5 TABLET ORAL at 17:51

## 2020-01-01 RX ADMIN — SODIUM BICARBONATE 650 MG TABLET 650 MG: at 22:30

## 2020-01-01 RX ADMIN — THERA TABS 1 TABLET: TAB at 14:13

## 2020-01-01 RX ADMIN — MORPHINE SULFATE 1 MG: 2 INJECTION, SOLUTION INTRAMUSCULAR; INTRAVENOUS at 06:08

## 2020-01-01 RX ADMIN — ONDANSETRON 4 MG: 2 INJECTION INTRAMUSCULAR; INTRAVENOUS at 17:29

## 2020-01-01 RX ADMIN — CALCIUM GLUCONATE 1 G: 20 INJECTION, SOLUTION INTRAVENOUS at 08:44

## 2020-01-01 RX ADMIN — MIDODRINE HYDROCHLORIDE 10 MG: 5 TABLET ORAL at 21:40

## 2020-01-01 RX ADMIN — LACTULOSE 20 G: 10 SOLUTION ORAL at 08:54

## 2020-01-01 RX ADMIN — Medication 10 ML: at 08:45

## 2020-01-01 RX ADMIN — MIDODRINE HYDROCHLORIDE 2.5 MG: 2.5 TABLET ORAL at 17:59

## 2020-01-01 RX ADMIN — MORPHINE SULFATE 1 MG: 2 INJECTION, SOLUTION INTRAMUSCULAR; INTRAVENOUS at 11:03

## 2020-01-01 RX ADMIN — LORAZEPAM 0.5 MG: 0.5 TABLET ORAL at 23:48

## 2020-01-01 RX ADMIN — OXYCODONE HYDROCHLORIDE AND ACETAMINOPHEN 250 MG: 500 TABLET ORAL at 09:45

## 2020-01-01 RX ADMIN — PHENYLEPHRINE HYDROCHLORIDE 100 MCG: 10 INJECTION INTRAVENOUS at 15:07

## 2020-01-01 RX ADMIN — HYDROCODONE BITARTRATE AND ACETAMINOPHEN 1 TABLET: 5; 325 TABLET ORAL at 12:42

## 2020-01-01 RX ADMIN — MIDODRINE HYDROCHLORIDE 10 MG: 5 TABLET ORAL at 17:33

## 2020-01-01 RX ADMIN — SODIUM BICARBONATE 650 MG: 650 TABLET ORAL at 09:04

## 2020-01-01 RX ADMIN — THERA TABS 1 TABLET: TAB at 09:45

## 2020-01-01 RX ADMIN — LACTULOSE 20 G: 10 SOLUTION ORAL at 20:00

## 2020-01-01 RX ADMIN — PANTOPRAZOLE SODIUM 40 MG: 40 TABLET, DELAYED RELEASE ORAL at 16:49

## 2020-01-01 RX ADMIN — GUAIFENESIN 600 MG: 600 TABLET, EXTENDED RELEASE ORAL at 20:06

## 2020-01-01 RX ADMIN — FLUTICASONE PROPIONATE 2 SPRAY: 50 SPRAY, METERED NASAL at 08:17

## 2020-01-01 RX ADMIN — LACTULOSE 10 G: 10 SOLUTION ORAL at 17:32

## 2020-01-01 RX ADMIN — HEPARIN SODIUM AND DEXTROSE 22 UNITS/KG/HR: 10000; 5 INJECTION INTRAVENOUS at 15:22

## 2020-01-01 RX ADMIN — ONDANSETRON 4 MG: 2 INJECTION INTRAMUSCULAR; INTRAVENOUS at 05:53

## 2020-01-01 RX ADMIN — LACTULOSE 20 G: 10 SOLUTION ORAL at 21:28

## 2020-01-01 RX ADMIN — PANTOPRAZOLE SODIUM 40 MG: 40 TABLET, DELAYED RELEASE ORAL at 08:50

## 2020-01-01 RX ADMIN — PANTOPRAZOLE SODIUM 40 MG: 40 TABLET, DELAYED RELEASE ORAL at 06:34

## 2020-01-01 RX ADMIN — CEFTRIAXONE SODIUM 1 G: 1 INJECTION, POWDER, FOR SOLUTION INTRAMUSCULAR; INTRAVENOUS at 12:13

## 2020-01-01 RX ADMIN — ALBUMIN HUMAN 25 G: 0.25 SOLUTION INTRAVENOUS at 09:21

## 2020-01-01 RX ADMIN — LEVOTHYROXINE SODIUM 50 MCG: 50 TABLET ORAL at 06:57

## 2020-01-01 RX ADMIN — MIDODRINE HYDROCHLORIDE 10 MG: 5 TABLET ORAL at 16:59

## 2020-01-01 RX ADMIN — ALBUMIN HUMAN 25 G: 0.25 SOLUTION INTRAVENOUS at 16:52

## 2020-01-01 RX ADMIN — HYDROCORTISONE ACETATE PRAMOXINE HCL 1 APPLICATION: 2.5; 1 CREAM TOPICAL at 08:50

## 2020-01-01 RX ADMIN — LEVOTHYROXINE SODIUM 75 MCG: 75 TABLET ORAL at 05:46

## 2020-01-01 RX ADMIN — FUROSEMIDE 20 MG: 10 INJECTION, SOLUTION INTRAMUSCULAR; INTRAVENOUS at 17:29

## 2020-01-01 RX ADMIN — SODIUM CHLORIDE 8 MG/HR: 900 INJECTION INTRAVENOUS at 18:49

## 2020-01-01 RX ADMIN — MORPHINE SULFATE 1 MG: 2 INJECTION, SOLUTION INTRAMUSCULAR; INTRAVENOUS at 00:56

## 2020-01-01 RX ADMIN — LEVOTHYROXINE SODIUM 50 MCG: 50 TABLET ORAL at 05:33

## 2020-01-01 RX ADMIN — ONDANSETRON 4 MG: 2 INJECTION INTRAMUSCULAR; INTRAVENOUS at 22:30

## 2020-01-01 RX ADMIN — SODIUM CHLORIDE, PRESERVATIVE FREE 10 ML: 5 INJECTION INTRAVENOUS at 21:58

## 2020-01-01 RX ADMIN — LACTULOSE 20 G: 10 SOLUTION ORAL at 05:24

## 2020-01-01 RX ADMIN — ALBUMIN HUMAN 25 G: 0.25 SOLUTION INTRAVENOUS at 00:29

## 2020-01-01 RX ADMIN — LACTULOSE 20 G: 10 SOLUTION ORAL at 22:29

## 2020-01-01 RX ADMIN — PANTOPRAZOLE SODIUM 40 MG: 40 TABLET, DELAYED RELEASE ORAL at 16:52

## 2020-01-01 RX ADMIN — DEXTROSE MONOHYDRATE 50 ML: 500 INJECTION PARENTERAL at 19:46

## 2020-01-01 RX ADMIN — LIDOCAINE HYDROCHLORIDE 18 ML: 10 INJECTION, SOLUTION INFILTRATION; PERINEURAL at 16:28

## 2020-01-01 RX ADMIN — SODIUM BICARBONATE 650 MG: 650 TABLET ORAL at 08:00

## 2020-01-01 RX ADMIN — MIDODRINE HYDROCHLORIDE 2.5 MG: 5 TABLET ORAL at 09:05

## 2020-01-01 RX ADMIN — FLUTICASONE PROPIONATE 2 SPRAY: 50 SPRAY, METERED NASAL at 08:51

## 2020-01-01 RX ADMIN — MORPHINE SULFATE 1 MG: 2 INJECTION, SOLUTION INTRAMUSCULAR; INTRAVENOUS at 09:32

## 2020-01-01 RX ADMIN — MORPHINE SULFATE 1 MG: 2 INJECTION, SOLUTION INTRAMUSCULAR; INTRAVENOUS at 06:07

## 2020-01-01 RX ADMIN — PHENYLEPHRINE HYDROCHLORIDE 100 MCG: 10 INJECTION INTRAVENOUS at 15:13

## 2020-01-01 RX ADMIN — SODIUM CHLORIDE 75 ML/HR: 9 INJECTION, SOLUTION INTRAVENOUS at 02:56

## 2020-01-01 RX ADMIN — HYDROCORTISONE ACETATE PRAMOXINE HCL: 2.5; 1 CREAM TOPICAL at 09:05

## 2020-01-01 RX ADMIN — HYDROCORTISONE ACETATE PRAMOXINE HCL: 2.5; 1 CREAM TOPICAL at 08:54

## 2020-01-01 RX ADMIN — HYDROCODONE BITARTRATE AND ACETAMINOPHEN 1 TABLET: 5; 325 TABLET ORAL at 20:58

## 2020-01-01 RX ADMIN — FENTANYL CITRATE 50 MCG: 0.05 INJECTION, SOLUTION INTRAMUSCULAR; INTRAVENOUS at 16:18

## 2020-01-01 RX ADMIN — MORPHINE SULFATE 1 MG: 2 INJECTION, SOLUTION INTRAMUSCULAR; INTRAVENOUS at 20:15

## 2020-01-01 RX ADMIN — SODIUM CHLORIDE, PRESERVATIVE FREE 10 ML: 5 INJECTION INTRAVENOUS at 08:01

## 2020-01-01 RX ADMIN — ALBUMIN HUMAN 62.5 G: 0.25 SOLUTION INTRAVENOUS at 12:13

## 2020-01-01 RX ADMIN — ONDANSETRON 4 MG: 2 INJECTION INTRAMUSCULAR; INTRAVENOUS at 22:02

## 2020-01-01 RX ADMIN — HYDROCORTISONE ACETATE PRAMOXINE HCL: 2.5; 1 CREAM TOPICAL at 09:50

## 2020-01-01 RX ADMIN — HYDROCORTISONE ACETATE PRAMOXINE HCL: 2.5; 1 CREAM TOPICAL at 08:31

## 2020-01-01 RX ADMIN — ALBUMIN HUMAN 25 G: 0.25 SOLUTION INTRAVENOUS at 22:49

## 2020-01-01 RX ADMIN — FEBUXOSTAT 40 MG: 40 TABLET, FILM COATED ORAL at 14:41

## 2020-01-01 RX ADMIN — LACTULOSE 20 G: 10 SOLUTION ORAL at 06:07

## 2020-01-01 RX ADMIN — MORPHINE SULFATE 1 MG: 4 INJECTION INTRAVENOUS at 13:56

## 2020-01-01 RX ADMIN — CEFTRIAXONE SODIUM 1 G: 1 INJECTION, POWDER, FOR SOLUTION INTRAMUSCULAR; INTRAVENOUS at 13:00

## 2020-01-01 RX ADMIN — THERA TABS 1 TABLET: TAB at 09:25

## 2020-01-01 RX ADMIN — MAGNESIUM SULFATE HEPTAHYDRATE 1 G: 1 INJECTION, SOLUTION INTRAVENOUS at 09:33

## 2020-01-01 RX ADMIN — ONDANSETRON HYDROCHLORIDE 4 MG: 4 TABLET, FILM COATED ORAL at 12:30

## 2020-01-01 RX ADMIN — LORAZEPAM 0.5 MG: 0.5 TABLET ORAL at 23:58

## 2020-01-01 RX ADMIN — SODIUM CHLORIDE 60 ML/HR: 9 INJECTION, SOLUTION INTRAVENOUS at 12:19

## 2020-01-01 RX ADMIN — ALBUMIN HUMAN 25 G: 0.25 SOLUTION INTRAVENOUS at 08:27

## 2020-01-01 RX ADMIN — PROCHLORPERAZINE EDISYLATE 5 MG: 5 INJECTION INTRAMUSCULAR; INTRAVENOUS at 11:33

## 2020-01-01 RX ADMIN — ONDANSETRON 4 MG: 2 INJECTION INTRAMUSCULAR; INTRAVENOUS at 23:44

## 2020-01-01 RX ADMIN — MUPIROCIN: 20 OINTMENT TOPICAL at 12:15

## 2020-01-01 RX ADMIN — SODIUM CHLORIDE 8 MG/HR: 900 INJECTION INTRAVENOUS at 04:13

## 2020-01-01 RX ADMIN — FUROSEMIDE 20 MG: 10 INJECTION, SOLUTION INTRAMUSCULAR; INTRAVENOUS at 16:45

## 2020-01-01 RX ADMIN — ALBUMIN HUMAN 25 G: 0.25 SOLUTION INTRAVENOUS at 16:12

## 2020-01-01 RX ADMIN — SODIUM CHLORIDE 250 ML: 900 INJECTION, SOLUTION INTRAVENOUS at 18:29

## 2020-01-01 RX ADMIN — LEVOTHYROXINE SODIUM 50 MCG: 50 TABLET ORAL at 20:42

## 2020-01-01 RX ADMIN — SODIUM BICARBONATE 650 MG: 650 TABLET ORAL at 08:50

## 2020-01-01 RX ADMIN — Medication 10 ML: at 08:19

## 2020-01-01 RX ADMIN — MIDODRINE HYDROCHLORIDE 10 MG: 5 TABLET ORAL at 06:34

## 2020-01-01 RX ADMIN — SODIUM CHLORIDE 12.5 MG: 900 INJECTION, SOLUTION INTRAVENOUS at 23:19

## 2020-01-01 RX ADMIN — HYDROCORTISONE ACETATE PRAMOXINE HCL: 2.5; 1 CREAM TOPICAL at 08:09

## 2020-01-01 RX ADMIN — FUROSEMIDE 40 MG: 10 INJECTION, SOLUTION INTRAMUSCULAR; INTRAVENOUS at 20:55

## 2020-01-01 RX ADMIN — HYDROCORTISONE ACETATE PRAMOXINE HCL: 2.5; 1 CREAM TOPICAL at 21:14

## 2020-01-01 RX ADMIN — SODIUM CHLORIDE, POTASSIUM CHLORIDE, SODIUM LACTATE AND CALCIUM CHLORIDE: 600; 310; 30; 20 INJECTION, SOLUTION INTRAVENOUS at 14:35

## 2020-01-01 RX ADMIN — MELATONIN TAB 5 MG 5 MG: 5 TAB at 22:55

## 2020-01-01 RX ADMIN — HYDROMORPHONE HYDROCHLORIDE 0.5 MG: 2 INJECTION, SOLUTION INTRAMUSCULAR; INTRAVENOUS; SUBCUTANEOUS at 08:45

## 2020-01-01 RX ADMIN — LACTULOSE 10 G: 10 SOLUTION ORAL at 21:41

## 2020-01-01 RX ADMIN — LACTULOSE 20 G: 10 SOLUTION ORAL at 11:35

## 2020-01-01 RX ADMIN — LORAZEPAM 0.5 MG: 0.5 TABLET ORAL at 03:07

## 2020-01-01 RX ADMIN — LACTULOSE 20 G: 10 SOLUTION ORAL at 08:32

## 2020-01-01 RX ADMIN — MIDODRINE HYDROCHLORIDE 10 MG: 5 TABLET ORAL at 09:45

## 2020-01-01 RX ADMIN — FENTANYL CITRATE 100 MCG: 50 INJECTION INTRAMUSCULAR; INTRAVENOUS at 14:36

## 2020-01-01 RX ADMIN — LACTULOSE 20 G: 10 SOLUTION ORAL at 08:00

## 2020-01-01 RX ADMIN — ALBUMIN HUMAN 25 G: 0.25 SOLUTION INTRAVENOUS at 00:37

## 2020-01-01 RX ADMIN — POTASSIUM CHLORIDE 10 MEQ: 7.46 INJECTION, SOLUTION INTRAVENOUS at 09:43

## 2020-01-01 RX ADMIN — MIDODRINE HYDROCHLORIDE 10 MG: 5 TABLET ORAL at 07:58

## 2020-01-01 RX ADMIN — LACTULOSE 10 G: 10 SOLUTION ORAL at 09:26

## 2020-01-01 RX ADMIN — LACTULOSE 20 G: 10 SOLUTION ORAL at 11:24

## 2020-01-01 RX ADMIN — FUROSEMIDE 40 MG: 10 INJECTION, SOLUTION INTRAMUSCULAR; INTRAVENOUS at 20:41

## 2020-01-01 RX ADMIN — MIDODRINE HYDROCHLORIDE 10 MG: 5 TABLET ORAL at 08:45

## 2020-01-01 RX ADMIN — LORAZEPAM 0.5 MG: 0.5 TABLET ORAL at 23:21

## 2020-01-01 RX ADMIN — GUAIFENESIN 600 MG: 600 TABLET, EXTENDED RELEASE ORAL at 09:04

## 2020-01-01 RX ADMIN — SUCCINYLCHOLINE CHLORIDE 140 MG: 20 INJECTION, SOLUTION INTRAMUSCULAR; INTRAVENOUS; PARENTERAL at 14:42

## 2020-01-01 RX ADMIN — ONDANSETRON 4 MG: 2 INJECTION INTRAMUSCULAR; INTRAVENOUS at 20:22

## 2020-01-01 RX ADMIN — FUROSEMIDE 40 MG: 10 INJECTION, SOLUTION INTRAMUSCULAR; INTRAVENOUS at 23:00

## 2020-01-01 RX ADMIN — Medication 10 ML: at 20:27

## 2020-01-01 RX ADMIN — PANTOPRAZOLE SODIUM 40 MG: 40 TABLET, DELAYED RELEASE ORAL at 17:36

## 2020-01-01 RX ADMIN — LEVOTHYROXINE SODIUM 50 MCG: 50 TABLET ORAL at 06:15

## 2020-01-01 RX ADMIN — MIDODRINE HYDROCHLORIDE 2.5 MG: 2.5 TABLET ORAL at 03:14

## 2020-01-01 RX ADMIN — HYDROCORTISONE ACETATE PRAMOXINE HCL 1 APPLICATION: 2.5; 1 CREAM TOPICAL at 21:14

## 2020-01-01 RX ADMIN — HYDROCORTISONE ACETATE PRAMOXINE HCL: 2.5; 1 CREAM TOPICAL at 21:57

## 2020-01-01 RX ADMIN — FEBUXOSTAT 40 MG: 40 TABLET ORAL at 07:59

## 2020-01-01 RX ADMIN — SODIUM CHLORIDE 500 ML: 9 INJECTION, SOLUTION INTRAVENOUS at 22:51

## 2020-01-01 RX ADMIN — FEBUXOSTAT 40 MG: 40 TABLET, FILM COATED ORAL at 08:06

## 2020-01-01 RX ADMIN — PANTOPRAZOLE SODIUM 40 MG: 40 TABLET, DELAYED RELEASE ORAL at 06:39

## 2020-01-01 RX ADMIN — MIDODRINE HYDROCHLORIDE 2.5 MG: 2.5 TABLET ORAL at 06:39

## 2020-01-01 RX ADMIN — SODIUM CHLORIDE 8 MG/HR: 900 INJECTION INTRAVENOUS at 00:28

## 2020-01-01 RX ADMIN — SODIUM BICARBONATE 650 MG: 650 TABLET ORAL at 10:13

## 2020-01-01 RX ADMIN — MIDODRINE HYDROCHLORIDE 2.5 MG: 2.5 TABLET ORAL at 16:52

## 2020-01-01 RX ADMIN — HYDROCORTISONE ACETATE PRAMOXINE HCL: 2.5; 1 CREAM TOPICAL at 09:16

## 2020-01-01 RX ADMIN — GUAIFENESIN 600 MG: 600 TABLET, EXTENDED RELEASE ORAL at 20:43

## 2020-01-01 RX ADMIN — FLUTICASONE PROPIONATE 2 SPRAY: 50 SPRAY, METERED NASAL at 08:45

## 2020-01-01 RX ADMIN — LEVOTHYROXINE SODIUM 50 MCG: 50 TABLET ORAL at 05:04

## 2020-01-01 RX ADMIN — SODIUM CHLORIDE 8 MG/HR: 900 INJECTION INTRAVENOUS at 14:41

## 2020-01-01 RX ADMIN — PANTOPRAZOLE SODIUM 40 MG: 40 TABLET, DELAYED RELEASE ORAL at 22:30

## 2020-01-01 RX ADMIN — ALBUMIN HUMAN 25 G: 0.25 SOLUTION INTRAVENOUS at 16:45

## 2020-01-01 RX ADMIN — Medication 10 ML: at 20:55

## 2020-01-01 RX ADMIN — CALCIUM GLUCONATE 1 G: 20 INJECTION, SOLUTION INTRAVENOUS at 10:17

## 2020-01-01 RX ADMIN — PHENYLEPHRINE HYDROCHLORIDE 100 MCG: 10 INJECTION INTRAVENOUS at 15:01

## 2020-01-01 RX ADMIN — MUPIROCIN: 20 OINTMENT TOPICAL at 15:21

## 2020-01-01 RX ADMIN — ONDANSETRON 4 MG: 2 INJECTION INTRAMUSCULAR; INTRAVENOUS at 19:46

## 2020-01-01 RX ADMIN — BUMETANIDE 1 MG: 1 TABLET ORAL at 08:44

## 2020-01-01 RX ADMIN — MIDODRINE HYDROCHLORIDE 10 MG: 5 TABLET ORAL at 09:06

## 2020-01-01 RX ADMIN — TECHNETIUM TC99M PYROPHOSPHATE 1 DOSE: 12 INJECTION INTRAVENOUS at 14:40

## 2020-01-01 RX ADMIN — SODIUM BICARBONATE 650 MG: 650 TABLET ORAL at 16:20

## 2020-01-01 RX ADMIN — MIDODRINE HYDROCHLORIDE 2.5 MG: 2.5 TABLET ORAL at 06:08

## 2020-01-01 RX ADMIN — MUPIROCIN: 20 OINTMENT TOPICAL at 21:56

## 2020-01-01 RX ADMIN — Medication 2 DROP: at 11:42

## 2020-01-01 RX ADMIN — IOPAMIDOL 100 ML: 612 INJECTION, SOLUTION INTRAVENOUS at 15:13

## 2020-01-01 RX ADMIN — MUPIROCIN: 20 OINTMENT TOPICAL at 21:25

## 2020-01-01 RX ADMIN — BUMETANIDE 2 MG: 2 TABLET ORAL at 16:49

## 2020-01-01 RX ADMIN — ONDANSETRON 4 MG: 2 INJECTION INTRAMUSCULAR; INTRAVENOUS at 08:54

## 2020-01-01 RX ADMIN — OXYCODONE HYDROCHLORIDE AND ACETAMINOPHEN 250 MG: 500 TABLET ORAL at 08:18

## 2020-01-01 RX ADMIN — ACETAMINOPHEN 650 MG: 325 TABLET ORAL at 22:58

## 2020-01-01 RX ADMIN — LACTULOSE 10 G: 10 SOLUTION ORAL at 20:41

## 2020-01-01 RX ADMIN — LACTULOSE 10 G: 10 SOLUTION ORAL at 08:19

## 2020-01-01 RX ADMIN — GUAIFENESIN 600 MG: 600 TABLET, EXTENDED RELEASE ORAL at 07:57

## 2020-01-01 RX ADMIN — MIDODRINE HYDROCHLORIDE 10 MG: 5 TABLET ORAL at 09:25

## 2020-01-01 RX ADMIN — SODIUM BICARBONATE 50 MEQ: 84 INJECTION, SOLUTION INTRAVENOUS at 19:56

## 2020-01-01 RX ADMIN — SODIUM BICARBONATE 650 MG: 650 TABLET ORAL at 20:06

## 2020-01-01 RX ADMIN — SODIUM CHLORIDE 125 ML/HR: 9 INJECTION, SOLUTION INTRAVENOUS at 09:07

## 2020-01-01 RX ADMIN — FEBUXOSTAT 40 MG: 40 TABLET, FILM COATED ORAL at 08:01

## 2020-01-01 RX ADMIN — PANTOPRAZOLE SODIUM 40 MG: 40 TABLET, DELAYED RELEASE ORAL at 07:58

## 2020-01-01 RX ADMIN — LEVOTHYROXINE SODIUM 50 MCG: 50 TABLET ORAL at 06:08

## 2020-01-01 RX ADMIN — HYDROCODONE BITARTRATE AND ACETAMINOPHEN 1 TABLET: 5; 325 TABLET ORAL at 03:02

## 2020-01-01 RX ADMIN — FEBUXOSTAT 40 MG: 40 TABLET, FILM COATED ORAL at 08:00

## 2020-01-01 RX ADMIN — MIDODRINE HYDROCHLORIDE 10 MG: 5 TABLET ORAL at 17:32

## 2020-01-01 RX ADMIN — POTASSIUM CHLORIDE 40 MEQ: 1500 TABLET, EXTENDED RELEASE ORAL at 09:25

## 2020-01-01 RX ADMIN — HYDROCORTISONE ACETATE PRAMOXINE HCL 1 APPLICATION: 2.5; 1 CREAM TOPICAL at 21:01

## 2020-01-01 RX ADMIN — LORAZEPAM 0.5 MG: 0.5 TABLET ORAL at 22:10

## 2020-01-01 RX ADMIN — LEVOTHYROXINE SODIUM 50 MCG: 50 TABLET ORAL at 05:46

## 2020-01-01 RX ADMIN — MORPHINE SULFATE 1 MG: 4 INJECTION INTRAVENOUS at 15:30

## 2020-01-01 RX ADMIN — MIDODRINE HYDROCHLORIDE 10 MG: 5 TABLET ORAL at 16:49

## 2020-01-01 RX ADMIN — SUGAMMADEX 200 MG: 100 INJECTION, SOLUTION INTRAVENOUS at 15:42

## 2020-01-01 RX ADMIN — ONDANSETRON 4 MG: 2 INJECTION INTRAMUSCULAR; INTRAVENOUS at 02:14

## 2020-01-01 RX ADMIN — LEVOTHYROXINE SODIUM 50 MCG: 50 TABLET ORAL at 06:39

## 2020-01-01 RX ADMIN — GUAIFENESIN 600 MG: 600 TABLET, EXTENDED RELEASE ORAL at 10:12

## 2020-01-01 RX ADMIN — FLUTICASONE PROPIONATE 2 SPRAY: 50 SPRAY, METERED NASAL at 08:09

## 2020-01-01 RX ADMIN — POTASSIUM CHLORIDE 20 MEQ: 1500 TABLET, EXTENDED RELEASE ORAL at 12:15

## 2020-01-01 RX ADMIN — HYDROCORTISONE ACETATE PRAMOXINE HCL: 2.5; 1 CREAM TOPICAL at 23:56

## 2020-01-01 RX ADMIN — MIDODRINE HYDROCHLORIDE 2.5 MG: 5 TABLET ORAL at 22:58

## 2020-01-01 RX ADMIN — LACTULOSE 10 G: 10 SOLUTION ORAL at 09:02

## 2020-01-01 RX ADMIN — GUAIFENESIN 600 MG: 600 TABLET, EXTENDED RELEASE ORAL at 21:58

## 2020-01-01 RX ADMIN — HYDROCORTISONE ACETATE PRAMOXINE HCL: 2.5; 1 CREAM TOPICAL at 08:17

## 2020-01-01 RX ADMIN — LACTULOSE 20 G: 10 SOLUTION ORAL at 03:14

## 2020-01-01 RX ADMIN — BARIUM SULFATE 55 ML: 0.81 POWDER, FOR SUSPENSION ORAL at 13:36

## 2020-01-01 RX ADMIN — ONDANSETRON 4 MG: 2 INJECTION INTRAMUSCULAR; INTRAVENOUS at 20:30

## 2020-01-01 RX ADMIN — HYDROCORTISONE ACETATE PRAMOXINE HCL: 2.5; 1 CREAM TOPICAL at 08:02

## 2020-01-01 RX ADMIN — HYDROCODONE BITARTRATE AND ACETAMINOPHEN 1 TABLET: 5; 325 TABLET ORAL at 04:35

## 2020-01-01 RX ADMIN — SODIUM BICARBONATE 650 MG: 650 TABLET ORAL at 13:36

## 2020-01-01 RX ADMIN — SODIUM BICARBONATE 650 MG: 650 TABLET ORAL at 15:22

## 2020-01-01 RX ADMIN — MUPIROCIN: 20 OINTMENT TOPICAL at 05:45

## 2020-01-01 RX ADMIN — FLUTICASONE PROPIONATE 2 SPRAY: 50 SPRAY, METERED NASAL at 08:00

## 2020-01-01 RX ADMIN — FEBUXOSTAT 40 MG: 40 TABLET ORAL at 09:26

## 2020-01-01 RX ADMIN — HEPARIN SODIUM AND DEXTROSE 18 UNITS/KG/HR: 10000; 5 INJECTION INTRAVENOUS at 04:03

## 2020-01-01 RX ADMIN — COLLAGENASE SANTYL: 250 OINTMENT TOPICAL at 14:50

## 2020-01-01 RX ADMIN — HYDROCORTISONE ACETATE PRAMOXINE HCL: 2.5; 1 CREAM TOPICAL at 20:07

## 2020-01-01 RX ADMIN — Medication 1200 MG: at 10:05

## 2020-01-01 RX ADMIN — LEVOTHYROXINE SODIUM 50 MCG: 50 TABLET ORAL at 05:21

## 2020-01-01 RX ADMIN — THERA TABS 1 TABLET: TAB at 09:02

## 2020-01-01 RX ADMIN — FUROSEMIDE 20 MG: 10 INJECTION, SOLUTION INTRAMUSCULAR; INTRAVENOUS at 09:28

## 2020-01-01 RX ADMIN — FLUTICASONE PROPIONATE 2 SPRAY: 50 SPRAY, METERED NASAL at 09:05

## 2020-01-01 RX ADMIN — HEPARIN SODIUM AND DEXTROSE 25.29 UNITS/KG/HR: 10000; 5 INJECTION INTRAVENOUS at 05:08

## 2020-01-01 RX ADMIN — FUROSEMIDE 40 MG: 10 INJECTION, SOLUTION INTRAMUSCULAR; INTRAVENOUS at 17:50

## 2020-01-01 RX ADMIN — MIDODRINE HYDROCHLORIDE 10 MG: 5 TABLET ORAL at 18:52

## 2020-01-01 RX ADMIN — BUMETANIDE 1 MG: 1 TABLET ORAL at 09:25

## 2020-01-01 RX ADMIN — SODIUM CHLORIDE 8 MG/HR: 900 INJECTION INTRAVENOUS at 08:55

## 2020-01-01 RX ADMIN — LACTULOSE 20 G: 10 SOLUTION ORAL at 07:59

## 2020-01-01 RX ADMIN — SALINE NASAL SPRAY 1 SPRAY: 1.5 SOLUTION NASAL at 23:56

## 2020-01-01 RX ADMIN — POTASSIUM CHLORIDE 40 MEQ: 1500 TABLET, EXTENDED RELEASE ORAL at 17:38

## 2020-01-01 RX ADMIN — MIDODRINE HYDROCHLORIDE 2.5 MG: 2.5 TABLET ORAL at 20:31

## 2020-01-01 RX ADMIN — SODIUM BICARBONATE 650 MG: 650 TABLET ORAL at 20:00

## 2020-01-01 RX ADMIN — ACETAMINOPHEN 650 MG: 325 TABLET ORAL at 17:27

## 2020-01-01 RX ADMIN — HYDROCORTISONE ACETATE PRAMOXINE HCL: 2.5; 1 CREAM TOPICAL at 09:11

## 2020-01-01 RX ADMIN — DEXTROSE 15 G: 15 GEL ORAL at 14:10

## 2020-01-01 RX ADMIN — HEPARIN SODIUM 5000 UNITS: 5000 INJECTION INTRAVENOUS; SUBCUTANEOUS at 14:14

## 2020-01-01 RX ADMIN — MORPHINE SULFATE 1 MG: 4 INJECTION INTRAVENOUS at 16:47

## 2020-01-01 RX ADMIN — PANTOPRAZOLE SODIUM 40 MG: 40 TABLET, DELAYED RELEASE ORAL at 18:52

## 2020-01-01 RX ADMIN — LEVOTHYROXINE SODIUM 50 MCG: 50 TABLET ORAL at 05:20

## 2020-01-01 RX ADMIN — MORPHINE SULFATE 1 MG: 2 INJECTION, SOLUTION INTRAMUSCULAR; INTRAVENOUS at 08:45

## 2020-01-01 RX ADMIN — ROCURONIUM BROMIDE 30 MG: 10 INJECTION, SOLUTION INTRAVENOUS at 14:57

## 2020-01-01 RX ADMIN — MUPIROCIN: 20 OINTMENT TOPICAL at 20:47

## 2020-01-01 RX ADMIN — LEVOTHYROXINE SODIUM 50 MCG: 50 TABLET ORAL at 05:17

## 2020-01-01 RX ADMIN — OXYCODONE HYDROCHLORIDE AND ACETAMINOPHEN 250 MG: 500 TABLET ORAL at 14:13

## 2020-01-01 RX ADMIN — MAGNESIUM SULFATE HEPTAHYDRATE 1 G: 1 INJECTION, SOLUTION INTRAVENOUS at 09:08

## 2020-01-01 RX ADMIN — HYDROCORTISONE ACETATE PRAMOXINE HCL: 2.5; 1 CREAM TOPICAL at 20:27

## 2020-01-01 RX ADMIN — Medication 1200 MG: at 09:45

## 2020-01-01 RX ADMIN — FEBUXOSTAT 40 MG: 40 TABLET, FILM COATED ORAL at 09:04

## 2020-01-01 RX ADMIN — LACTULOSE 20 G: 10 SOLUTION ORAL at 06:34

## 2020-01-01 RX ADMIN — TAZOBACTAM SODIUM AND PIPERACILLIN SODIUM 3.38 G: 375; 3 INJECTION, SOLUTION INTRAVENOUS at 20:30

## 2020-01-01 RX ADMIN — HYDROMORPHONE HYDROCHLORIDE 0.5 MG: 2 INJECTION, SOLUTION INTRAMUSCULAR; INTRAVENOUS; SUBCUTANEOUS at 20:22

## 2020-01-01 RX ADMIN — LACTULOSE 10 G: 10 SOLUTION ORAL at 17:50

## 2020-01-01 RX ADMIN — ALBUMIN HUMAN 12.5 G: 0.25 SOLUTION INTRAVENOUS at 18:06

## 2020-01-01 RX ADMIN — LORAZEPAM 0.5 MG: 0.5 TABLET ORAL at 10:12

## 2020-01-01 RX ADMIN — CEFDINIR 300 MG: 300 CAPSULE ORAL at 09:25

## 2020-01-01 RX ADMIN — MORPHINE SULFATE 1 MG: 2 INJECTION, SOLUTION INTRAMUSCULAR; INTRAVENOUS at 19:45

## 2020-01-01 RX ADMIN — PANTOPRAZOLE SODIUM 40 MG: 40 TABLET, DELAYED RELEASE ORAL at 16:50

## 2020-01-01 RX ADMIN — MIDODRINE HYDROCHLORIDE 2.5 MG: 2.5 TABLET ORAL at 11:35

## 2020-01-01 RX ADMIN — ONDANSETRON 4 MG: 2 INJECTION INTRAMUSCULAR; INTRAVENOUS at 11:57

## 2020-01-01 RX ADMIN — SODIUM POLYSTYRENE SULFONATE 15 G: 15 SUSPENSION ORAL; RECTAL at 22:30

## 2020-01-01 RX ADMIN — GUAIFENESIN 600 MG: 600 TABLET, EXTENDED RELEASE ORAL at 09:27

## 2020-01-01 RX ADMIN — INSULIN HUMAN 10 UNITS: 100 INJECTION, SOLUTION PARENTERAL at 19:41

## 2020-01-01 RX ADMIN — MIDODRINE HYDROCHLORIDE 10 MG: 5 TABLET ORAL at 11:43

## 2020-01-01 RX ADMIN — Medication 10 ML: at 09:07

## 2020-01-01 RX ADMIN — LIDOCAINE HYDROCHLORIDE 100 MG: 20 INJECTION, SOLUTION INFILTRATION; PERINEURAL at 14:42

## 2020-01-01 RX ADMIN — GUAIFENESIN 600 MG: 600 TABLET, EXTENDED RELEASE ORAL at 22:30

## 2020-01-01 RX ADMIN — FENTANYL CITRATE 50 MCG: 0.05 INJECTION, SOLUTION INTRAMUSCULAR; INTRAVENOUS at 16:50

## 2020-01-01 RX ADMIN — Medication 1200 MG: at 21:41

## 2020-01-01 RX ADMIN — HYDROCODONE BITARTRATE AND ACETAMINOPHEN 1 TABLET: 5; 325 TABLET ORAL at 06:36

## 2020-01-01 RX ADMIN — HEPARIN SODIUM AND DEXTROSE 24 UNITS/KG/HR: 10000; 5 INJECTION INTRAVENOUS at 13:08

## 2020-01-01 RX ADMIN — FUROSEMIDE 40 MG: 10 INJECTION, SOLUTION INTRAMUSCULAR; INTRAVENOUS at 08:18

## 2020-01-01 RX ADMIN — GUAIFENESIN 600 MG: 600 TABLET, EXTENDED RELEASE ORAL at 20:00

## 2020-01-01 RX ADMIN — FLUDEOXYGLUCOSE F18 1 DOSE: 300 INJECTION INTRAVENOUS at 07:43

## 2020-01-01 RX ADMIN — SODIUM CHLORIDE 8 MG/HR: 900 INJECTION INTRAVENOUS at 19:36

## 2020-01-01 RX ADMIN — LACTULOSE 10 G: 10 SOLUTION ORAL at 11:55

## 2020-01-01 RX ADMIN — HYDROMORPHONE HYDROCHLORIDE 0.5 MG: 2 INJECTION, SOLUTION INTRAMUSCULAR; INTRAVENOUS; SUBCUTANEOUS at 12:15

## 2020-01-01 RX ADMIN — CEFDINIR 300 MG: 300 CAPSULE ORAL at 09:44

## 2020-01-01 RX ADMIN — SODIUM CHLORIDE 8 MG/HR: 900 INJECTION INTRAVENOUS at 22:55

## 2020-01-01 RX ADMIN — PANTOPRAZOLE SODIUM 40 MG: 40 TABLET, DELAYED RELEASE ORAL at 17:59

## 2020-01-01 RX ADMIN — MIDODRINE HYDROCHLORIDE 2.5 MG: 2.5 TABLET ORAL at 11:24

## 2020-01-01 RX ADMIN — POTASSIUM CHLORIDE 20 MEQ: 1500 TABLET, EXTENDED RELEASE ORAL at 09:45

## 2020-01-01 RX ADMIN — SODIUM BICARBONATE 650 MG TABLET 650 MG: at 21:28

## 2020-01-01 RX ADMIN — HYDROMORPHONE HYDROCHLORIDE 0.5 MG: 2 INJECTION, SOLUTION INTRAMUSCULAR; INTRAVENOUS; SUBCUTANEOUS at 00:19

## 2020-01-01 RX ADMIN — MUPIROCIN: 20 OINTMENT TOPICAL at 21:42

## 2020-01-01 RX ADMIN — LACTULOSE 20 G: 10 SOLUTION ORAL at 18:20

## 2020-01-01 RX ADMIN — FUROSEMIDE 20 MG: 10 INJECTION, SOLUTION INTRAMUSCULAR; INTRAVENOUS at 01:53

## 2020-01-01 RX ADMIN — SODIUM BICARBONATE 50 MEQ: 84 INJECTION, SOLUTION INTRAVENOUS at 16:53

## 2020-01-01 RX ADMIN — HYDROCORTISONE ACETATE PRAMOXINE HCL: 2.5; 1 CREAM TOPICAL at 00:23

## 2020-01-01 RX ADMIN — SODIUM CHLORIDE, PRESERVATIVE FREE 10 ML: 5 INJECTION INTRAVENOUS at 00:24

## 2020-01-01 RX ADMIN — HEPARIN SODIUM AND DEXTROSE 20 UNITS/KG/HR: 10000; 5 INJECTION INTRAVENOUS at 12:11

## 2020-01-01 RX ADMIN — LORAZEPAM 0.5 MG: 0.5 TABLET ORAL at 18:44

## 2020-01-01 RX ADMIN — MORPHINE SULFATE 1 MG: 2 INJECTION, SOLUTION INTRAMUSCULAR; INTRAVENOUS at 06:56

## 2020-01-01 RX ADMIN — PROPOFOL 150 MG: 10 INJECTION, EMULSION INTRAVENOUS at 14:42

## 2020-01-01 RX ADMIN — FLUTICASONE PROPIONATE 2 SPRAY: 50 SPRAY, METERED NASAL at 08:21

## 2020-01-01 RX ADMIN — LEVOTHYROXINE SODIUM 50 MCG: 50 TABLET ORAL at 06:40

## 2020-01-01 RX ADMIN — HYDROCORTISONE ACETATE PRAMOXINE HCL: 2.5; 1 CREAM TOPICAL at 20:43

## 2020-01-01 RX ADMIN — FUROSEMIDE 40 MG: 10 INJECTION, SOLUTION INTRAMUSCULAR; INTRAVENOUS at 17:27

## 2020-01-01 RX ADMIN — FUROSEMIDE 40 MG: 40 TABLET ORAL at 19:18

## 2020-01-01 RX ADMIN — MIDODRINE HYDROCHLORIDE 2.5 MG: 2.5 TABLET ORAL at 11:34

## 2020-01-01 RX ADMIN — MIDODRINE HYDROCHLORIDE 2.5 MG: 2.5 TABLET ORAL at 06:37

## 2020-01-01 RX ADMIN — CALCIUM GLUCONATE 1 G: 20 INJECTION, SOLUTION INTRAVENOUS at 19:40

## 2020-01-01 RX ADMIN — MUPIROCIN: 20 OINTMENT TOPICAL at 22:57

## 2020-01-01 RX ADMIN — FLUTICASONE PROPIONATE 2 SPRAY: 50 SPRAY, METERED NASAL at 10:13

## 2020-01-01 RX ADMIN — SODIUM BICARBONATE 650 MG: 650 TABLET ORAL at 20:43

## 2020-01-01 RX ADMIN — THERA TABS 1 TABLET: TAB at 08:45

## 2020-01-01 RX ADMIN — DIPHENHYDRAMINE HYDROCHLORIDE 12.5 MG: 50 INJECTION, SOLUTION INTRAMUSCULAR; INTRAVENOUS at 12:15

## 2020-01-01 RX ADMIN — CEFDINIR 300 MG: 300 CAPSULE ORAL at 08:44

## 2020-01-01 RX ADMIN — LACTULOSE 20 G: 10 SOLUTION ORAL at 23:48

## 2020-01-01 RX ADMIN — SODIUM CHLORIDE 8 MG/HR: 900 INJECTION INTRAVENOUS at 19:48

## 2020-01-01 RX ADMIN — FUROSEMIDE 20 MG: 20 TABLET ORAL at 09:06

## 2020-01-01 RX ADMIN — Medication 10 ML: at 20:23

## 2020-01-01 RX ADMIN — FLUTICASONE PROPIONATE 2 SPRAY: 50 SPRAY, METERED NASAL at 08:54

## 2020-01-01 RX ADMIN — IRON SUCROSE 300 MG: 20 INJECTION, SOLUTION INTRAVENOUS at 07:53

## 2020-01-01 RX ADMIN — MORPHINE SULFATE 1 MG: 4 INJECTION INTRAVENOUS at 11:24

## 2020-01-01 RX ADMIN — MIDODRINE HYDROCHLORIDE 2.5 MG: 2.5 TABLET ORAL at 11:54

## 2020-01-01 RX ADMIN — ONDANSETRON 4 MG: 2 INJECTION INTRAMUSCULAR; INTRAVENOUS at 05:29

## 2020-01-01 RX ADMIN — SODIUM CHLORIDE 75 ML/HR: 9 INJECTION, SOLUTION INTRAVENOUS at 19:48

## 2020-01-01 RX ADMIN — PHENYLEPHRINE HYDROCHLORIDE 0.5 MCG/KG/MIN: 10 INJECTION, SOLUTION INTRAMUSCULAR; INTRAVENOUS; SUBCUTANEOUS at 15:19

## 2020-01-01 RX ADMIN — SODIUM BICARBONATE 650 MG: 650 TABLET ORAL at 08:06

## 2020-01-01 RX ADMIN — MIDODRINE HYDROCHLORIDE 2.5 MG: 2.5 TABLET ORAL at 12:36

## 2020-01-01 RX ADMIN — MIDODRINE HYDROCHLORIDE 10 MG: 5 TABLET ORAL at 16:12

## 2020-01-01 RX ADMIN — BUMETANIDE 2 MG: 2 TABLET ORAL at 08:00

## 2020-01-01 RX ADMIN — HYDROCORTISONE ACETATE PRAMOXINE HCL: 2.5; 1 CREAM TOPICAL at 08:00

## 2020-01-01 RX ADMIN — BUMETANIDE 2 MG: 2 TABLET ORAL at 09:04

## 2020-01-01 RX ADMIN — SODIUM BICARBONATE 650 MG TABLET 650 MG: at 16:49

## 2020-01-01 RX ADMIN — MIDODRINE HYDROCHLORIDE 10 MG: 5 TABLET ORAL at 11:37

## 2020-01-01 RX ADMIN — SODIUM CHLORIDE, PRESERVATIVE FREE 10 ML: 5 INJECTION INTRAVENOUS at 20:45

## 2020-01-01 RX ADMIN — LACTULOSE 10 G: 10 SOLUTION ORAL at 12:14

## 2020-01-01 RX ADMIN — LACTULOSE 10 G: 10 SOLUTION ORAL at 18:02

## 2020-01-01 RX ADMIN — HYDROMORPHONE HYDROCHLORIDE 0.5 MG: 2 INJECTION, SOLUTION INTRAMUSCULAR; INTRAVENOUS; SUBCUTANEOUS at 09:17

## 2020-01-01 RX ADMIN — MORPHINE SULFATE 1 MG: 4 INJECTION INTRAVENOUS at 18:52

## 2020-01-01 RX ADMIN — ONDANSETRON 4 MG: 2 INJECTION INTRAMUSCULAR; INTRAVENOUS at 02:53

## 2020-01-01 RX ADMIN — SODIUM BICARBONATE 650 MG: 650 TABLET ORAL at 16:52

## 2020-01-01 RX ADMIN — LACTULOSE 20 G: 10 SOLUTION ORAL at 08:50

## 2020-01-01 RX ADMIN — PANTOPRAZOLE SODIUM 40 MG: 40 TABLET, DELAYED RELEASE ORAL at 06:08

## 2020-01-01 RX ADMIN — Medication 10 ML: at 09:29

## 2020-01-01 RX ADMIN — Medication 10 ML: at 09:50

## 2020-01-01 RX ADMIN — BUMETANIDE 1 MG: 1 TABLET ORAL at 17:38

## 2020-01-01 RX ADMIN — ONDANSETRON HYDROCHLORIDE 4 MG: 4 TABLET, FILM COATED ORAL at 04:24

## 2020-01-01 RX ADMIN — POTASSIUM CHLORIDE 20 MEQ: 1500 TABLET, EXTENDED RELEASE ORAL at 08:44

## 2020-01-01 RX ADMIN — OXYCODONE HYDROCHLORIDE AND ACETAMINOPHEN 250 MG: 500 TABLET ORAL at 09:02

## 2020-01-01 RX ADMIN — FLUTICASONE PROPIONATE 2 SPRAY: 50 SPRAY, METERED NASAL at 08:01

## 2020-01-01 RX ADMIN — Medication 10 ML: at 20:41

## 2020-01-01 RX ADMIN — MORPHINE SULFATE 1 MG: 2 INJECTION, SOLUTION INTRAMUSCULAR; INTRAVENOUS at 18:44

## 2020-01-01 RX ADMIN — SODIUM CHLORIDE 75 ML/HR: 9 INJECTION, SOLUTION INTRAVENOUS at 18:43

## 2020-01-01 RX ADMIN — SODIUM CHLORIDE 500 ML: 9 INJECTION, SOLUTION INTRAVENOUS at 13:35

## 2020-01-01 RX ADMIN — LACTULOSE 10 G: 10 SOLUTION ORAL at 08:44

## 2020-01-01 RX ADMIN — ACETAMINOPHEN 650 MG: 160 SUSPENSION ORAL at 12:16

## 2020-01-01 RX ADMIN — MIDODRINE HYDROCHLORIDE 10 MG: 5 TABLET ORAL at 08:04

## 2020-01-01 RX ADMIN — LORAZEPAM 0.5 MG: 0.5 TABLET ORAL at 09:07

## 2020-01-01 RX ADMIN — MIDODRINE HYDROCHLORIDE 10 MG: 5 TABLET ORAL at 06:08

## 2020-01-01 RX ADMIN — HYDROCORTISONE ACETATE PRAMOXINE HCL: 2.5; 1 CREAM TOPICAL at 08:20

## 2020-01-01 RX ADMIN — HYDROCORTISONE ACETATE PRAMOXINE HCL: 2.5; 1 CREAM TOPICAL at 21:09

## 2020-01-01 RX ADMIN — HYDROCORTISONE ACETATE PRAMOXINE HCL 1 APPLICATION: 2.5; 1 CREAM TOPICAL at 21:10

## 2020-01-01 RX ADMIN — MIDODRINE HYDROCHLORIDE 10 MG: 5 TABLET ORAL at 08:18

## 2020-01-01 RX ADMIN — HYDROCORTISONE ACETATE PRAMOXINE HCL: 2.5; 1 CREAM TOPICAL at 10:13

## 2020-01-01 RX ADMIN — FUROSEMIDE 40 MG: 10 INJECTION, SOLUTION INTRAMUSCULAR; INTRAVENOUS at 07:53

## 2020-01-01 RX ADMIN — SODIUM CHLORIDE 125 ML/HR: 9 INJECTION, SOLUTION INTRAVENOUS at 22:55

## 2020-01-01 RX ADMIN — MIDODRINE HYDROCHLORIDE 10 MG: 5 TABLET ORAL at 06:39

## 2020-01-01 RX ADMIN — FEBUXOSTAT 40 MG: 40 TABLET, FILM COATED ORAL at 08:17

## 2020-01-01 RX ADMIN — BUMETANIDE 2 MG: 2 TABLET ORAL at 08:08

## 2020-01-01 RX ADMIN — LACTULOSE 20 G: 10 SOLUTION ORAL at 17:34

## 2020-01-01 RX ADMIN — HEPARIN SODIUM AND DEXTROSE 18 UNITS/KG/HR: 10000; 5 INJECTION INTRAVENOUS at 22:56

## 2020-01-01 RX ADMIN — MAGNESIUM SULFATE HEPTAHYDRATE 1 G: 1 INJECTION, SOLUTION INTRAVENOUS at 11:13

## 2020-01-01 RX ADMIN — HYDROCORTISONE ACETATE PRAMOXINE HCL: 2.5; 1 CREAM TOPICAL at 20:31

## 2020-01-01 RX ADMIN — MORPHINE SULFATE 1 MG: 2 INJECTION, SOLUTION INTRAMUSCULAR; INTRAVENOUS at 03:39

## 2020-01-01 RX ADMIN — FENTANYL CITRATE 25 MCG: 50 INJECTION INTRAMUSCULAR; INTRAVENOUS at 20:30

## 2020-01-01 RX ADMIN — CEFTRIAXONE SODIUM 1 G: 10 INJECTION, POWDER, FOR SOLUTION INTRAVENOUS at 19:35

## 2020-01-01 RX ADMIN — FEBUXOSTAT 40 MG: 40 TABLET, FILM COATED ORAL at 08:50

## 2020-01-01 RX ADMIN — HYDROCODONE BITARTRATE AND ACETAMINOPHEN 1 TABLET: 5; 325 TABLET ORAL at 05:21

## 2020-01-01 RX ADMIN — ONDANSETRON 4 MG: 2 INJECTION INTRAMUSCULAR; INTRAVENOUS at 21:14

## 2020-01-01 RX ADMIN — HYDROCORTISONE ACETATE PRAMOXINE HCL: 2.5; 1 CREAM TOPICAL at 09:09

## 2020-01-01 RX ADMIN — LEVOTHYROXINE SODIUM 50 MCG: 50 TABLET ORAL at 05:24

## 2020-01-01 RX ADMIN — SODIUM CHLORIDE, POTASSIUM CHLORIDE, SODIUM LACTATE AND CALCIUM CHLORIDE: 600; 310; 30; 20 INJECTION, SOLUTION INTRAVENOUS at 15:51

## 2020-01-01 RX ADMIN — FEBUXOSTAT 40 MG: 40 TABLET, FILM COATED ORAL at 10:13

## 2020-01-01 RX ADMIN — MIDODRINE HYDROCHLORIDE 2.5 MG: 2.5 TABLET ORAL at 08:50

## 2020-01-01 RX ADMIN — LACTULOSE 20 G: 10 SOLUTION ORAL at 20:06

## 2020-01-01 RX ADMIN — CALCIUM GLUCONATE 2 G: 98 INJECTION, SOLUTION INTRAVENOUS at 14:40

## 2020-01-01 RX ADMIN — PANTOPRAZOLE SODIUM 40 MG: 40 TABLET, DELAYED RELEASE ORAL at 08:35

## 2020-01-01 RX ADMIN — ONDANSETRON 4 MG: 2 INJECTION INTRAMUSCULAR; INTRAVENOUS at 02:28

## 2020-01-01 RX ADMIN — LACTULOSE 20 G: 10 SOLUTION ORAL at 20:30

## 2020-01-01 RX ADMIN — LACTULOSE 20 G: 10 SOLUTION ORAL at 10:13

## 2020-01-01 RX ADMIN — ONDANSETRON 4 MG: 2 INJECTION INTRAMUSCULAR; INTRAVENOUS at 05:02

## 2020-01-01 RX ADMIN — DIPHENHYDRAMINE HCL 25 MG: 25 TABLET ORAL at 17:27

## 2020-01-01 RX ADMIN — Medication 10 ML: at 09:00

## 2020-01-01 RX ADMIN — Medication 1 DOSE: at 15:15

## 2020-01-01 RX ADMIN — ALBUMIN HUMAN 25 G: 0.25 SOLUTION INTRAVENOUS at 11:18

## 2020-01-01 RX ADMIN — ONDANSETRON 4 MG: 2 INJECTION INTRAMUSCULAR; INTRAVENOUS at 16:19

## 2020-01-01 RX ADMIN — PANTOPRAZOLE SODIUM 40 MG: 40 TABLET, DELAYED RELEASE ORAL at 16:59

## 2020-01-01 RX ADMIN — GUAIFENESIN 600 MG: 600 TABLET, EXTENDED RELEASE ORAL at 08:50

## 2020-01-01 RX ADMIN — IOPAMIDOL 100 ML: 612 INJECTION, SOLUTION INTRAVENOUS at 09:00

## 2020-01-01 RX ADMIN — COLLAGENASE SANTYL: 250 OINTMENT TOPICAL at 08:30

## 2020-01-01 RX ADMIN — CEFTRIAXONE SODIUM 1 G: 1 INJECTION, POWDER, FOR SOLUTION INTRAMUSCULAR; INTRAVENOUS at 17:52

## 2020-01-01 RX ADMIN — SODIUM CHLORIDE 125 ML/HR: 9 INJECTION, SOLUTION INTRAVENOUS at 13:35

## 2020-01-01 RX ADMIN — ALBUMIN HUMAN 62.5 G: 0.25 SOLUTION INTRAVENOUS at 14:50

## 2020-01-01 RX ADMIN — SODIUM CHLORIDE, PRESERVATIVE FREE 10 ML: 5 INJECTION INTRAVENOUS at 20:06

## 2020-01-01 RX ADMIN — MORPHINE SULFATE 1 MG: 2 INJECTION, SOLUTION INTRAMUSCULAR; INTRAVENOUS at 23:59

## 2020-01-01 RX ADMIN — MIDODRINE HYDROCHLORIDE 2.5 MG: 5 TABLET ORAL at 18:01

## 2020-01-01 RX ADMIN — HEPARIN SODIUM AND DEXTROSE 26 UNITS/KG/HR: 10000; 5 INJECTION INTRAVENOUS at 07:52

## 2020-01-01 RX ADMIN — HYDROCODONE BITARTRATE AND ACETAMINOPHEN 1 TABLET: 5; 325 TABLET ORAL at 16:52

## 2020-01-01 RX ADMIN — Medication 10 ML: at 20:52

## 2020-01-01 RX ADMIN — MIDODRINE HYDROCHLORIDE 10 MG: 5 TABLET ORAL at 20:55

## 2020-01-01 RX ADMIN — SODIUM CHLORIDE 100 ML/HR: 9 INJECTION, SOLUTION INTRAVENOUS at 03:53

## 2020-01-01 RX ADMIN — SODIUM CHLORIDE, POTASSIUM CHLORIDE, SODIUM LACTATE AND CALCIUM CHLORIDE 500 ML: 600; 310; 30; 20 INJECTION, SOLUTION INTRAVENOUS at 18:49

## 2020-01-01 RX ADMIN — SODIUM CHLORIDE 125 ML/HR: 9 INJECTION, SOLUTION INTRAVENOUS at 17:01

## 2020-01-01 RX ADMIN — CEFOXITIN SODIUM 2 G: 1 POWDER, FOR SOLUTION INTRAVENOUS at 14:40

## 2020-01-01 RX ADMIN — MIDODRINE HYDROCHLORIDE 2.5 MG: 2.5 TABLET ORAL at 08:44

## 2020-01-01 RX ADMIN — ALBUMIN HUMAN 25 G: 0.25 SOLUTION INTRAVENOUS at 09:14

## 2020-01-01 RX ADMIN — IRON SUCROSE 300 MG: 20 INJECTION, SOLUTION INTRAVENOUS at 13:10

## 2020-01-01 RX ADMIN — BARIUM SULFATE 4 ML: 980 POWDER, FOR SUSPENSION ORAL at 13:36

## 2020-01-01 RX ADMIN — MIDODRINE HYDROCHLORIDE 2.5 MG: 2.5 TABLET ORAL at 12:11

## 2020-01-01 RX ADMIN — LEVOTHYROXINE SODIUM 50 MCG: 50 TABLET ORAL at 07:58

## 2020-01-01 RX ADMIN — PANTOPRAZOLE SODIUM 40 MG: 40 TABLET, DELAYED RELEASE ORAL at 08:04

## 2020-01-01 RX ADMIN — Medication 10 ML: at 21:42

## 2020-01-01 RX ADMIN — BARIUM SULFATE 50 ML: 400 SUSPENSION ORAL at 13:36

## 2020-01-01 RX ADMIN — MORPHINE SULFATE 1 MG: 2 INJECTION, SOLUTION INTRAMUSCULAR; INTRAVENOUS at 11:40

## 2020-01-01 RX ADMIN — MIDODRINE HYDROCHLORIDE 2.5 MG: 2.5 TABLET ORAL at 17:36

## 2020-01-01 RX ADMIN — SODIUM CHLORIDE 8 MG/HR: 900 INJECTION INTRAVENOUS at 05:21

## 2020-01-01 RX ADMIN — SODIUM BICARBONATE: 84 INJECTION, SOLUTION INTRAVENOUS at 08:05

## 2020-01-01 RX ADMIN — DEXTROSE 15 G: 15 GEL ORAL at 14:34

## 2020-01-01 RX ADMIN — GUAIFENESIN 600 MG: 600 TABLET, EXTENDED RELEASE ORAL at 08:00

## 2020-01-01 RX ADMIN — SODIUM BICARBONATE: 84 INJECTION, SOLUTION INTRAVENOUS at 20:30

## 2020-01-01 RX ADMIN — LACTULOSE 10 G: 10 SOLUTION ORAL at 22:58

## 2020-01-01 RX ADMIN — POTASSIUM CHLORIDE 40 MEQ: 1500 TABLET, EXTENDED RELEASE ORAL at 17:50

## 2020-01-01 RX ADMIN — LEVOTHYROXINE SODIUM 50 MCG: 50 TABLET ORAL at 05:30

## 2020-01-01 RX ADMIN — HYDROMORPHONE HYDROCHLORIDE 2 MG: 2 INJECTION, SOLUTION INTRAMUSCULAR; INTRAVENOUS; SUBCUTANEOUS at 22:02

## 2020-01-01 RX ADMIN — THERA TABS 1 TABLET: TAB at 08:18

## 2020-01-01 RX ADMIN — SODIUM BICARBONATE 650 MG: 650 TABLET ORAL at 16:50

## 2020-01-01 RX ADMIN — TAZOBACTAM SODIUM AND PIPERACILLIN SODIUM 3.38 G: 375; 3 INJECTION, SOLUTION INTRAVENOUS at 11:41

## 2020-01-01 RX ADMIN — LACTULOSE 10 G: 10 SOLUTION ORAL at 09:06

## 2020-01-01 RX ADMIN — SODIUM CHLORIDE 75 ML/HR: 9 INJECTION, SOLUTION INTRAVENOUS at 16:52

## 2020-01-01 RX ADMIN — LACTULOSE 10 G: 10 SOLUTION ORAL at 17:33

## 2020-01-01 RX ADMIN — OXYCODONE HYDROCHLORIDE AND ACETAMINOPHEN 250 MG: 500 TABLET ORAL at 09:26

## 2020-01-01 RX ADMIN — MUPIROCIN: 20 OINTMENT TOPICAL at 09:00

## 2020-01-01 RX ADMIN — ONDANSETRON 4 MG: 2 INJECTION INTRAMUSCULAR; INTRAVENOUS at 22:46

## 2020-01-01 RX ADMIN — LACTULOSE 20 G: 10 SOLUTION ORAL at 00:57

## 2020-01-01 RX ADMIN — CEFTRIAXONE SODIUM 1 G: 1 INJECTION, POWDER, FOR SOLUTION INTRAMUSCULAR; INTRAVENOUS at 11:49

## 2020-01-01 RX ADMIN — BUMETANIDE 1 MG: 1 TABLET ORAL at 09:45

## 2020-01-01 RX ADMIN — MORPHINE SULFATE 1 MG: 4 INJECTION INTRAVENOUS at 10:18

## 2020-01-01 RX ADMIN — ONDANSETRON 4 MG: 2 INJECTION INTRAMUSCULAR; INTRAVENOUS at 11:42

## 2020-01-01 RX ADMIN — HYDROCODONE BITARTRATE AND ACETAMINOPHEN 1 TABLET: 5; 325 TABLET ORAL at 11:28

## 2020-01-01 RX ADMIN — HEPARIN SODIUM AND DEXTROSE 24 UNITS/KG/HR: 10000; 5 INJECTION INTRAVENOUS at 22:54

## 2020-01-01 RX ADMIN — MIDODRINE HYDROCHLORIDE 10 MG: 5 TABLET ORAL at 20:41

## 2020-01-01 RX ADMIN — PANTOPRAZOLE SODIUM 40 MG: 40 TABLET, DELAYED RELEASE ORAL at 17:42

## 2020-01-01 RX ADMIN — HYDROCODONE BITARTRATE AND ACETAMINOPHEN 1 TABLET: 5; 325 TABLET ORAL at 09:21

## 2020-01-01 RX ADMIN — FENTANYL CITRATE 25 MCG: 50 INJECTION INTRAMUSCULAR; INTRAVENOUS at 04:12

## 2020-01-01 RX ADMIN — HYDROCORTISONE ACETATE PRAMOXINE HCL: 2.5; 1 CREAM TOPICAL at 08:45

## 2020-01-01 RX ADMIN — MIDODRINE HYDROCHLORIDE 10 MG: 5 TABLET ORAL at 17:27

## 2020-01-01 RX ADMIN — FUROSEMIDE 40 MG: 10 INJECTION, SOLUTION INTRAMUSCULAR; INTRAVENOUS at 17:32

## 2020-01-01 RX ADMIN — SODIUM CHLORIDE, PRESERVATIVE FREE 10 ML: 5 INJECTION INTRAVENOUS at 08:04

## 2020-01-01 RX ADMIN — LACTULOSE 10 G: 10 SOLUTION ORAL at 17:38

## 2020-01-01 RX ADMIN — ONDANSETRON 4 MG: 2 INJECTION INTRAMUSCULAR; INTRAVENOUS at 12:14

## 2020-01-01 RX ADMIN — ALBUMIN HUMAN 25 G: 0.25 SOLUTION INTRAVENOUS at 01:53

## 2020-01-01 RX ADMIN — BUMETANIDE 1 MG: 1 TABLET ORAL at 17:32

## 2020-01-01 RX ADMIN — ONDANSETRON 4 MG: 2 INJECTION INTRAMUSCULAR; INTRAVENOUS at 08:04

## 2020-01-01 RX ADMIN — GUAIFENESIN 600 MG: 600 TABLET, EXTENDED RELEASE ORAL at 21:10

## 2020-01-01 RX ADMIN — MUPIROCIN: 20 OINTMENT TOPICAL at 23:04

## 2020-01-01 RX ADMIN — FUROSEMIDE 20 MG: 20 TABLET ORAL at 18:02

## 2020-01-01 RX ADMIN — LACTULOSE 10 G: 10 SOLUTION ORAL at 14:14

## 2020-01-01 RX ADMIN — TAZOBACTAM SODIUM AND PIPERACILLIN SODIUM 3.38 G: 375; 3 INJECTION, SOLUTION INTRAVENOUS at 08:54

## 2020-01-01 RX ADMIN — CALCIUM GLUCONATE 1 G: 20 INJECTION, SOLUTION INTRAVENOUS at 21:28

## 2020-01-01 RX ADMIN — ONDANSETRON 4 MG: 2 INJECTION INTRAMUSCULAR; INTRAVENOUS at 13:37

## 2020-01-01 RX ADMIN — POTASSIUM CHLORIDE 10 MEQ: 7.46 INJECTION, SOLUTION INTRAVENOUS at 06:07

## 2020-01-01 RX ADMIN — SALINE NASAL SPRAY 1 SPRAY: 1.5 SOLUTION NASAL at 22:55

## 2020-01-01 RX ADMIN — Medication 10 ML: at 22:58

## 2020-01-01 RX ADMIN — SODIUM BICARBONATE 650 MG TABLET 650 MG: at 08:06

## 2020-01-01 RX ADMIN — MIDODRINE HYDROCHLORIDE 10 MG: 5 TABLET ORAL at 08:34

## 2020-01-01 RX ADMIN — MIDODRINE HYDROCHLORIDE 10 MG: 5 TABLET ORAL at 10:12

## 2020-01-01 RX ADMIN — ONDANSETRON 4 MG: 2 INJECTION INTRAMUSCULAR; INTRAVENOUS at 20:25

## 2020-01-01 RX ADMIN — IOPAMIDOL 20 ML: 612 INJECTION, SOLUTION INTRAVENOUS at 14:15

## 2020-01-01 RX ADMIN — LIDOCAINE HYDROCHLORIDE 7 ML: 10 INJECTION, SOLUTION INFILTRATION; PERINEURAL at 15:29

## 2020-01-01 RX ADMIN — HYDROCORTISONE ACETATE PRAMOXINE HCL: 2.5; 1 CREAM TOPICAL at 20:25

## 2020-01-01 RX ADMIN — PANTOPRAZOLE SODIUM 40 MG: 40 TABLET, DELAYED RELEASE ORAL at 06:15

## 2020-01-01 RX ADMIN — OXYCODONE HYDROCHLORIDE AND ACETAMINOPHEN 250 MG: 500 TABLET ORAL at 08:44

## 2020-01-01 RX ADMIN — SODIUM BICARBONATE 650 MG: 650 TABLET ORAL at 21:10

## 2020-01-03 NOTE — PROCEDURES
"Ultrasound guided paracentesis.   Date/Time: 1/3/2020 2:37 PM  Performed by: Jayesh Shen MD  Authorized by: Jayesh Shen MD   Consent: Written consent obtained.  Risks and benefits: risks, benefits and alternatives were discussed  Consent given by: patient  Patient understanding: patient states understanding of the procedure being performed  Patient consent: the patient's understanding of the procedure matches consent given  Procedure consent: procedure consent matches procedure scheduled  Relevant documents: relevant documents present and verified  Test results: test results available and properly labeled  Site marked: the operative site was marked  Imaging studies: imaging studies available  Patient identity confirmed: verbally with patient  Time out: Immediately prior to procedure a \"time out\" was called to verify the correct patient, procedure, equipment, support staff and site/side marked as required.  Procedure purpose: therapeutic  Indications: abdominal discomfort secondary to ascites  Anesthesia: local infiltration    Anesthesia:  Local Anesthetic: lidocaine 1% without epinephrine  Preparation: Patient was prepped and draped in the usual sterile fashion.  Needle gauge: 18  Ultrasound guidance: yes  Puncture site: right lower quadrant  Fluid appearance: serous  Patient tolerance: Patient tolerated the procedure well with no immediate complications  Comments: Please see nurses note for amount of fluid taken and albumin given.         "

## 2020-01-03 NOTE — PROGRESS NOTES
PARACENTESIS    Time Arrived in Department: 1300      Consent: yes  Allergies have been Reviewed: yes      ID Band Verified: yes    Method: Ambulatory and Wheelchair    Lab Results: Checked and MD Notified     Physician: Hermelinda      Nurse: Laurie Pleitez RN    IR Care Plan: Person Educated, Current Knowledge, Readiness to Learn , Teaching Method and Teaching Topic      Neurological: Within Normal Limits    Skin: Flesh, Warm and Dry    Respiratory Status: Respirations even and enlabored    Room In: 1300      Procedure: Paracentesis    Time Out Completed: yes    Time Out: 1400    Prep Time: 1402    Prep Site 1: Right Lateral Abdomen      Prep: Chlorhexidine and Sterile drape    Procedure Position: Right Side    Guidance: Ultrasound    Fluid Quality: Cloudy and Yellow    Procedure Note:         Intra Time 1:1405    Intra Assess 1:   LOC: Alert and Drowsy  Pain:  No Issues and Tolerating  Skin: Flesh, Warm and Dry  Respiratory Status:  Even and Unlabored  Intra Procedure Note 1: Paracentesis started        Intra Time 2: 1430    Intra Assess 2:   LOC: Alert  Pain: No Issues and Tolerating  Skin: Flesh, Warm and Dry  Respiratory Status: Even and Unlabored  Intra Procedure Note 2:         Intra Time 3: 1500    Intra Assess 3:   LOC: Alert  Pain: No Issues and Tolerating  Skin: Flesh, Warm and Dry  Respiratory Status: Even and Unlabored  Intra Procedure Note 3:      Intra Assess 4: 1530  LOC: Alert  Pain: No Issues and Tolerating  Skin: Flesh, Warm and Dry  Respiratory Status: Even and Unlabored  Intra Procedure Note 4:      Intra Assess 5: 1600  LOC: Alert  Pain: No Issues and Tolerating  Skin: Flesh, Warm and Dry  Respiratory Status: Even and Unlabored  Intra Procedure Note 5:  Paracentesis completed                Post-Procedure Position: HOB Elevated and Right side down    Dressing Site 1: 2x2, 4x4 and Tegaderm    Post Procedure Status:  Alert and Oriented, returned to baseline, Dressing Dry/ Intact, IV documented  (see flowsheet) and Dressing properly labeled    Specimen To Lab: no    Total Fluid Removed: 9.25 liters    Post Procedure Note:          Report Given To:     Admit/Transfer To:     Transfer Time:     Discharge Time:     Method: Ambulatory and Wheelchair    O2 on Transfer: no    Accompanied By:  Nurse    Clothes Changed:  Self    Discharged Location:  Routine to Home    Discharge Teaching:  Care of Dressing, Follow up with MD, Home Care Instructions Sheet Given and PAtient

## 2020-01-07 NOTE — PROGRESS NOTES
Subjective   Kathie Cohen is a 69 y.o. female.       HPI   Pt. Is here today for a follow up from [facility]; she slipped and fell at home on 12/25.  She had right shoulder, hip and knee pain following this.  She denies that she lost consciousness. She did have a small superficial laceration to her right forehead; steri strip was applied.  Xrays of right hip/pelvis, shoulder, knee were negative. Pains have improved. Pt. Needs to be released   Uses CBD ointment on knees that is helping.      The following portions of the patient's history were reviewed and updated as appropriate: allergies, current medications, past family history, past medical history, past social history, past surgical history and problem list.    Review of Systems   Constitutional: Negative for activity change, appetite change, chills, diaphoresis, fatigue, fever, unexpected weight gain and unexpected weight loss.   Respiratory: Negative for cough, shortness of breath and wheezing.    Cardiovascular: Negative for chest pain, palpitations and leg swelling.   Musculoskeletal: Negative for arthralgias, back pain, joint swelling and myalgias.   Neurological: Negative for dizziness, numbness, headache and confusion.   Psychiatric/Behavioral: Negative for depressed mood. The patient is not nervous/anxious.        Objective   Physical Exam   Constitutional: She is oriented to person, place, and time. She appears well-developed and well-nourished. No distress.   HENT:   Head: Normocephalic and atraumatic.   Eyes: Pupils are equal, round, and reactive to light. Conjunctivae are normal.   Neck: Normal range of motion. Neck supple.   Cardiovascular: Normal rate, regular rhythm, normal heart sounds and intact distal pulses.   No murmur heard.  Pulmonary/Chest: Effort normal and breath sounds normal. No respiratory distress.   Musculoskeletal: She exhibits no edema, tenderness or deformity.   Lymphadenopathy:     She has no cervical adenopathy.    Neurological: She is alert and oriented to person, place, and time.   Skin: Skin is warm and dry. Capillary refill takes less than 2 seconds. No rash noted. She is not diaphoretic. No erythema.   Psychiatric: She has a normal mood and affect.   Vitals reviewed.        Assessment/Plan   Kathie was seen today for fall.    Diagnoses and all orders for this visit:    Fall, subsequent encounter  Comments:  Stable.  No further falls at home.      Contusion of right shoulder, subsequent encounter  Comments:  Reviewed xray from ER.  Improved.  Able to return to work on 1/10    Contusion of right hip, subsequent encounter  Comments:  Reviewed xray from ER.  Improved.  Able to return to work on 1/10    Contusion of right knee, subsequent encounter  Comments:  Reviewed xray from ER.  Improved.  Able to return to work on 1/10

## 2020-01-17 NOTE — DISCHARGE INSTRUCTIONS
Continue your home medications and follow-up with your family doctor.  Please return to the emergency department if you develop increasing pain, fever increasing abdominal pain or shortness of breath.  You can occasionally use Tylenol to help your pain as needed.

## 2020-01-17 NOTE — PROGRESS NOTES
LLE Venous doppler study complete. Preliminary negative for DVT and SVT, fluid noted popliteal fossa called to Dr. Meadows

## 2020-01-17 NOTE — ED PROVIDER NOTES
" EMERGENCY DEPARTMENT ENCOUNTER    CHIEF COMPLAINT  Chief Complaint: L hip numbness  History given by: self  History limited by: nothing  Room Number: 31/31  PMD: Rosaura Keys PA  Gastroenterologist: Dr. Clark    HPI:  Pt is a 69 y.o. female who presents complaining of  L hip numbness that started this afternoon. Pt also c/o L hip pain. Pt denies abd pain, SOA, CP, back pain, nausea and chills. The pt was at work this afternoon, when she felt a pain in her L hip with numbness from the L hip to the L knee following. Her sx's remained constant with \"sharp\" pains exacerbated with positional movements. Due to ongoing sx's, she came to the ED for further evaluation.  Currently, patient is pain-free.  Pt denies hx of known back issues and recent fall/injury to area. Pt is not a smoker. Pt is a DNR. Hx of CURIEL and cirrhosis.    Duration:  today  Onset: sudden  Timing: constant  Location: l hip  Radiation: L knee  Quality: numbness  Intensity/Severity: moderate  Progression: unchanged  Associated Symptoms: L hip pain.  Aggravating Factors: pain is exacerbated with positional movements  Alleviating Factors: none stated  Previous Episodes: none stated  Treatment before arrival: none stated    PAST MEDICAL HISTORY  Active Ambulatory Problems     Diagnosis Date Noted   • Pancytopenia (CMS/HCC) 07/01/2019   • Hypertension, portal (CMS/HCC) 07/01/2019   • Cirrhosis (CMS/HCC) 07/01/2019   • Obesity 07/01/2019   • CRF (chronic renal failure), stage 3 (moderate) (CMS/HCC) 07/01/2019   • CHF (congestive heart failure) (CMS/HCC) 07/01/2019   • Gastroesophageal reflux disease 04/17/2019   • Hypothyroidism 07/11/2019   • Personal history of other malignant neoplasms of lymphoid, hematopoietic and related tissues 03/10/2015   • Skin disorder 04/17/2019   • Iron deficiency anemia due to chronic blood loss 08/05/2019   • Microcytic anemia 08/05/2019   • Gastroesophageal reflux disease without esophagitis 08/05/2019   • Adverse effect of " iron 08/07/2019   • Solitary lung nodule 12/23/2019     Resolved Ambulatory Problems     Diagnosis Date Noted   • No Resolved Ambulatory Problems     Past Medical History:   Diagnosis Date   • Arthritis    • Cancer (CMS/HCC) 2011   • Constipation    • COPD (chronic obstructive pulmonary disease) (CMS/HCC)    • GERD (gastroesophageal reflux disease)    • History of colon polyps    • History of non-Hodgkin's lymphoma 2011   • Hypertension    • CURIEL (nonalcoholic steatohepatitis)    • Non-healing skin lesion    • Peripheral edema    • Portal hypertension (CMS/HCC)    • Seasonal allergies    • Skin lesions    • Ureteral obstruction        PAST SURGICAL HISTORY  Past Surgical History:   Procedure Laterality Date   • CATARACT EXTRACTION, BILATERAL     • CHOLECYSTECTOMY     • COLONOSCOPY     • CYSTOSCOPY W/ URETERAL STENT PLACEMENT      CHANGE STENT  EVERY 3 MONTHS   • LIVER BIOPSY     • MEDIPORT INSERTION, SINGLE     • MEDIPORT REMOVAL     • SKIN LESION EXCISION N/A 7/24/2019    Procedure: Excision right chest skin lesion 4.5cm excised diameter, Excision posterior neck skin lesion 1.5 cm excised diameter;  Surgeon: Frederick Ryan MD;  Location: [facility] OR;  Service: General   • TONSILLECTOMY     • TUBAL ABDOMINAL LIGATION     • URETERAL EXPLORATION      ATTEMPT TO REPAIR OBST SECONDARY TO CHEMO       FAMILY HISTORY  Family History   Problem Relation Age of Onset   • Hypertension Other        SOCIAL HISTORY  Social History     Socioeconomic History   • Marital status:      Spouse name: Not on file   • Number of children: Not on file   • Years of education: Not on file   • Highest education level: Not on file   Tobacco Use   • Smoking status: Never Smoker   • Smokeless tobacco: Never Used   Substance and Sexual Activity   • Alcohol use: Yes     Comment: Occasionally   • Drug use: No   • Sexual activity: Defer       ALLERGIES  Demerol [meperidine]    REVIEW OF SYSTEMS  Review of Systems   Constitutional:  Negative for fever.   HENT: Negative for sore throat.    Respiratory: Negative for cough and shortness of breath.    Cardiovascular: Negative for chest pain.   Gastrointestinal: Negative for abdominal pain, diarrhea and vomiting.   Genitourinary: Negative for dysuria.   Musculoskeletal: Positive for arthralgias (L hip pain). Negative for neck pain.   Skin: Negative for rash.   Neurological: Positive for numbness (L hip to L knee). Negative for weakness and headaches.   All other systems reviewed and are negative.      PHYSICAL EXAM  ED Triage Vitals   Temp Heart Rate Resp BP SpO2   01/17/20 1508 01/17/20 1507 01/17/20 1507 01/17/20 1507 01/17/20 1507   99.9 °F (37.7 °C) 83 16 116/59 93 %      Temp src Heart Rate Source Patient Position BP Location FiO2 (%)   01/17/20 1508 01/17/20 1506 -- -- --   Tympanic Monitor          Physical Exam   Constitutional: She is oriented to person, place, and time. No distress.   HENT:   Head: Normocephalic and atraumatic.   Eyes: Pupils are equal, round, and reactive to light. EOM are normal.   Neck: Normal range of motion. Neck supple.   Cardiovascular: Normal rate, regular rhythm and normal heart sounds.   Pulmonary/Chest: Effort normal and breath sounds normal. No respiratory distress.   Abdominal: Soft. Bowel sounds are normal. She exhibits distension and ascites (no tautness). There is no tenderness. There is no rebound and no guarding.   Musculoskeletal: Normal range of motion.        Left hip: She exhibits tenderness (palpation laterally).        Thoracic back: She exhibits no tenderness.        Lumbar back: She exhibits no tenderness.        Left upper leg: She exhibits tenderness (medial aspect of L thigh).        Right lower leg: She exhibits edema (4+).        Left lower leg: She exhibits edema (4+).   Neurological: She is alert and oriented to person, place, and time. She has normal sensation and normal strength.   Skin: Skin is warm and dry. No rash noted.   Psychiatric:  Mood and affect normal.   Nursing note and vitals reviewed.      LAB RESULTS  Lab Results (last 24 hours)     Procedure Component Value Units Date/Time    SCANNED - LABS [[id number]] Resulted:  01/17/20      Updated:  01/17/20 1525    CBC & Differential [[id number]] Collected:  01/17/20 1550    Specimen:  Blood Updated:  01/17/20 1603    Narrative:       The following orders were created for panel order CBC & Differential.  Procedure                               Abnormality         Status                     ---------                               -----------         ------                     CBC Auto Differential[[id number]]        Abnormal            Final result                 Please view results for these tests on the individual orders.    Comprehensive Metabolic Panel [[id number]]  (Abnormal) Collected:  01/17/20 1550    Specimen:  Blood Updated:  01/17/20 1637     Glucose 120 mg/dL      BUN 24 mg/dL      Creatinine 0.90 mg/dL      Sodium 131 mmol/L      Potassium 4.0 mmol/L      Chloride 95 mmol/L      CO2 24.6 mmol/L      Calcium 8.5 mg/dL      Total Protein 6.0 g/dL      Albumin 2.90 g/dL      ALT (SGPT) 15 U/L      AST (SGOT) 12 U/L      Alkaline Phosphatase 85 U/L      Total Bilirubin 0.4 mg/dL      eGFR Non African Amer 62 mL/min/1.73      Globulin 3.1 gm/dL      A/G Ratio 0.9 g/dL      BUN/Creatinine Ratio 26.7     Anion Gap 11.4 mmol/L     Narrative:       GFR Normal >60  Chronic Kidney Disease <60  Kidney Failure <15      Protime-INR [[id number]]  (Abnormal) Collected:  01/17/20 1550    Specimen:  Blood Updated:  01/17/20 1621     Protime 15.1 Seconds      INR 1.22    Lipase [[id number]]  (Normal) Collected:  01/17/20 1550    Specimen:  Blood Updated:  01/17/20 1633     Lipase 38 U/L     CBC Auto Differential [[id number]]  (Abnormal) Collected:  01/17/20 1550    Specimen:  Blood Updated:  01/17/20 1603     WBC 3.33 10*3/mm3      RBC 3.08 10*6/mm3      Hemoglobin 8.5 g/dL      Hematocrit 26.0 %      MCV  84.4 fL      MCH 27.6 pg      MCHC 32.7 g/dL      RDW 13.8 %      RDW-SD 42.3 fl      MPV 8.9 fL      Platelets 122 10*3/mm3      Neutrophil % 76.0 %      Lymphocyte % 12.9 %      Monocyte % 10.2 %      Eosinophil % 0.3 %      Basophil % 0.3 %      Immature Grans % 0.3 %      Neutrophils, Absolute 2.53 10*3/mm3      Lymphocytes, Absolute 0.43 10*3/mm3      Monocytes, Absolute 0.34 10*3/mm3      Eosinophils, Absolute 0.01 10*3/mm3      Basophils, Absolute 0.01 10*3/mm3      Immature Grans, Absolute 0.01 10*3/mm3      nRBC 0.0 /100 WBC     Sedimentation Rate [[id number]]  (Abnormal) Collected:  01/17/20 1550    Specimen:  Blood Updated:  01/17/20 1630     Sed Rate 61 mm/hr           I ordered the above labs and reviewed the results    RADIOLOGY  XR Spine Lumbar Complete 4+VW   Final Result       Degenerative changes. No acute fracture identified. If there is further   clinical concern, MRI could be considered for further evaluation.       This report was finalized on 1/17/2020 6:10 PM by Dr. Mo Morales M.D.          XR Femur 2 View Left   Final Result       No acute fracture is identified. If there is further clinical concern,   MRI could be considered for further evaluation.       This report was finalized on 1/17/2020 6:12 PM by Dr. Mo Morales M.D.               I ordered the above noted radiological studies. Interpreted by radiologist. Reviewed by me in PACS.     PROGRESS AND CONSULTS     1513 INR ordered. CMP ordered. Lipase ordered.    1542 XR Spine Lumbar ordered. XR Femur ordered. Zofran ordered. Morphine  4 mg ordered. Sedimentation rate ordered. Duplex Venous Left Lower extremity.     1648 Discussed pt w/Vascular Tech regarding Doppler which is negative for DVT however there is a popliteal fossa cyst measuring 5x4x1.4 cm.     1818 Patient is resting comfortably and in NAD. Patient is stable. BP- 118/65 HR- 82 Temp- 99.9 °F (37.7 °C) (Tympanic) O2 sat- 97%. The pt reports improved sx's.  Informed the patient results of XR, doppler and labs were unremarkable. Discussed the plan for d/c with f/u to PCP as needed. Pt denies all medications at this time. Pt understands and agrees with the plan, all questions answered.      MEDICAL DECISION MAKING  Results were reviewed/discussed with the patient and they were also made aware of online access. Pt also made aware that some labs, such as cultures, will not be resulted during ER visit and follow up with PMD is necessary.     MDM  Number of Diagnoses or Management Options     Amount and/or Complexity of Data Reviewed  Clinical lab tests: ordered and reviewed (INR 1.22  WBC 3.33  Sed Rate 61)  Tests in the radiology section of CPT®: ordered and reviewed (XR Femur  XR Spine)  Decide to obtain previous medical records or to obtain history from someone other than the patient: yes  Independent visualization of images, tracings, or specimens: yes           DIAGNOSIS  Final diagnoses:   Sciatica of left side       DISPOSITION  DISCHARGE    Patient discharged in stable condition.    Reviewed implications of results, diagnosis, meds, responsibility to follow up, warning signs and symptoms of possible worsening, potential complications and reasons to return to ER.    Patient/Family voiced understanding of above instructions.    Discussed plan for discharge, as there is no emergent indication for admission. Patient referred to primary care provider for BP management due to today's BP. Pt/family is agreeable and understands need for follow up and repeat testing.  Pt is aware that discharge does not mean that nothing is wrong but it indicates no emergency is present that requires admission and they must continue care with follow-up as given below or physician of their choice.     FOLLOW-UP  Rosaura Keys, PA  [address]  [place] [address]  [phone]    Schedule an appointment as soon as possible for a visit            Medication List       Changed    furosemide 20 MG tablet  Commonly known as:  LASIX  Take 1 tablet by mouth 2 (Two) Times a Day.  What changed:    when to take this  reasons to take this     levothyroxine 50 MCG tablet  Commonly known as:  SYNTHROID  Take 1 tablet by mouth Daily.  What changed:  additional instructions          Latest Documented Vital Signs:  As of 6:36 PM  BP- 118/65 HR- 82 Temp- 99.9 °F (37.7 °C) (Tympanic) O2 sat- 97%    --  Documentation assistance provided by jennifer Lyons for Dr. Gabriel Meadows.  Information recorded by the ushaibe was done at my direction and has been verified and validated by me.       Eloy Garcia  01/17/20 1822       Jacinto Meadows MD  01/17/20 1708

## 2020-01-20 NOTE — TELEPHONE ENCOUNTER
2nd call regarding Dr. Kendrick appointment / records. Per  patient has not been in their system for an appointment. MSD

## 2020-01-24 NOTE — ED PROVIDER NOTES
"EMERGENCY DEPARTMENT ENCOUNTER    Room Number:  43/43  Date seen:  1/24/2020  Time seen: 4:30 PM  PCP: Domi Cornejo APRN  Historian: patient, prior records    HPI:  Chief complaint:\"I need antibiotics\"  Context:Kathie Cohen is a 69 y.o. female who presents to the ED with c/o severe sore places that are abscesses on her right upper back that no doctor has been able to fix.  \"I've been to every doctor in . [place] and they haven't been able to figure it out. I need antibiotics so it will clear up\". She has been applying a cream to the area and it is not helping.  She states the pain is horrible, yet she declines any pain medication when I offered it.    She has h/o Cirrhosis of liver for which she goes to [facility] to have it drained frequently.     Timing:constant  Duration: 1 year  Location:right upper back  Radiation:no  Quality:painful, stabbing, sore  Intensity/Severity:moderate  Associated Symptoms:no  Previous Episodes:many  Treatment before arrival:multiple courses of antibiotics and skin ointments.     MEDICAL RECORD REVIEW      ALLERGIES  Demerol [meperidine]    PAST MEDICAL HISTORY  Active Ambulatory Problems     Diagnosis Date Noted   • Pancytopenia (CMS/HCC) 07/01/2019   • Hypertension, portal (CMS/HCC) 07/01/2019   • Cirrhosis (CMS/HCC) 07/01/2019   • Obesity 07/01/2019   • CRF (chronic renal failure), stage 3 (moderate) (CMS/HCC) 07/01/2019   • CHF (congestive heart failure) (CMS/HCC) 07/01/2019   • Gastroesophageal reflux disease 04/17/2019   • Hypothyroidism 07/11/2019   • Personal history of other malignant neoplasms of lymphoid, hematopoietic and related tissues 03/10/2015   • Skin disorder 04/17/2019   • Iron deficiency anemia due to chronic blood loss 08/05/2019   • Microcytic anemia 08/05/2019   • Gastroesophageal reflux disease without esophagitis 08/05/2019   • Adverse effect of iron 08/07/2019   • Solitary lung nodule 12/23/2019     Resolved Ambulatory Problems     " Diagnosis Date Noted   • No Resolved Ambulatory Problems     Past Medical History:   Diagnosis Date   • Arthritis    • Cancer (CMS/HCC) 2011   • Constipation    • COPD (chronic obstructive pulmonary disease) (CMS/HCC)    • GERD (gastroesophageal reflux disease)    • History of colon polyps    • History of non-Hodgkin's lymphoma 2011   • Hypertension    • CURIEL (nonalcoholic steatohepatitis)    • Non-healing skin lesion    • Peripheral edema    • Portal hypertension (CMS/HCC)    • Seasonal allergies    • Skin lesions    • Ureteral obstruction        PAST SURGICAL HISTORY  Past Surgical History:   Procedure Laterality Date   • CATARACT EXTRACTION, BILATERAL     • CHOLECYSTECTOMY     • COLONOSCOPY     • CYSTOSCOPY W/ URETERAL STENT PLACEMENT      CHANGE STENT  EVERY 3 MONTHS   • LIVER BIOPSY     • MEDIPORT INSERTION, SINGLE     • MEDIPORT REMOVAL     • SKIN LESION EXCISION N/A 7/24/2019    Procedure: Excision right chest skin lesion 4.5cm excised diameter, Excision posterior neck skin lesion 1.5 cm excised diameter;  Surgeon: Frederick Ryan MD;  Location: [facility] OR;  Service: General   • TONSILLECTOMY     • TUBAL ABDOMINAL LIGATION     • URETERAL EXPLORATION      ATTEMPT TO REPAIR OBST SECONDARY TO CHEMO       FAMILY HISTORY  Family History   Problem Relation Age of Onset   • Hypertension Other        SOCIAL HISTORY  Social History     Socioeconomic History   • Marital status:      Spouse name: Not on file   • Number of children: Not on file   • Years of education: Not on file   • Highest education level: Not on file   Tobacco Use   • Smoking status: Never Smoker   • Smokeless tobacco: Never Used   Substance and Sexual Activity   • Alcohol use: Not Currently   • Drug use: No   • Sexual activity: Defer       REVIEW OF SYSTEMS  Review of Systems  A complete 10 point review of systems is negative except as noted in the HPI.     PHYSICAL EXAM  ED Triage Vitals [01/24/20 1438]   Temp Heart Rate Resp BP SpO2    98.2 °F (36.8 °C) 94 16 126/74 97 %      Temp src Heart Rate Source Patient Position BP Location FiO2 (%)   Tympanic Monitor -- -- --     Physical Exam   Constitutional: She is oriented to person, place, and time. She appears dehydrated. She appears to not be writhing in pain. She appears unhealthy (appears chronically ill). She does not have a sickly appearance. No distress.   HENT:   Head: Normocephalic and atraumatic.   Cardiovascular: Normal rate, regular rhythm, S1 normal and S2 normal. Exam reveals no gallop and no friction rub.   Murmur heard.  Pulmonary/Chest: She has decreased breath sounds in the right lower field and the left lower field.   Abdominal: She exhibits fluid wave and ascites.   Large ascites to abdomen.  Known for patient.    Neurological: She is alert and oriented to person, place, and time. GCS score is 15.   Skin: Skin is warm and dry. She is not diaphoretic. There is pallor.            LAB RESULTS  Recent Results (from the past 24 hour(s))   Comprehensive Metabolic Panel    Collection Time: 01/24/20  2:54 PM   Result Value Ref Range    Glucose 126 (H) 65 - 99 mg/dL    BUN 29 (H) 8 - 23 mg/dL    Creatinine 1.30 (H) 0.57 - 1.00 mg/dL    Sodium 130 (L) 136 - 145 mmol/L    Potassium 3.1 (L) 3.5 - 5.2 mmol/L    Chloride 92 (L) 98 - 107 mmol/L    CO2 25.4 22.0 - 29.0 mmol/L    Calcium 8.3 (L) 8.6 - 10.5 mg/dL    Total Protein 5.6 (L) 6.0 - 8.5 g/dL    Albumin 2.80 (L) 3.50 - 5.20 g/dL    ALT (SGPT) 15 1 - 33 U/L    AST (SGOT) 14 1 - 32 U/L    Alkaline Phosphatase 90 39 - 117 U/L    Total Bilirubin 0.5 0.2 - 1.2 mg/dL    eGFR Non African Amer 41 (L) >60 mL/min/1.73    Globulin 2.8 gm/dL    A/G Ratio 1.0 g/dL    BUN/Creatinine Ratio 22.3 7.0 - 25.0    Anion Gap 12.6 5.0 - 15.0 mmol/L   CBC Auto Differential    Collection Time: 01/24/20  2:54 PM   Result Value Ref Range    WBC 8.19 3.40 - 10.80 10*3/mm3    RBC 3.31 (L) 3.77 - 5.28 10*6/mm3    Hemoglobin 9.1 (L) 12.0 - 15.9 g/dL    Hematocrit 28.4  (L) 34.0 - 46.6 %    MCV 85.8 79.0 - 97.0 fL    MCH 27.5 26.6 - 33.0 pg    MCHC 32.0 31.5 - 35.7 g/dL    RDW 13.9 12.3 - 15.4 %    RDW-SD 43.3 37.0 - 54.0 fl    MPV 8.5 6.0 - 12.0 fL    Platelets 188 140 - 450 10*3/mm3    Neutrophil % 81.3 (H) 42.7 - 76.0 %    Lymphocyte % 9.0 (L) 19.6 - 45.3 %    Monocyte % 8.7 5.0 - 12.0 %    Eosinophil % 0.1 (L) 0.3 - 6.2 %    Basophil % 0.2 0.0 - 1.5 %    Immature Grans % 0.7 (H) 0.0 - 0.5 %    Neutrophils, Absolute 6.65 1.70 - 7.00 10*3/mm3    Lymphocytes, Absolute 0.74 0.70 - 3.10 10*3/mm3    Monocytes, Absolute 0.71 0.10 - 0.90 10*3/mm3    Eosinophils, Absolute 0.01 0.00 - 0.40 10*3/mm3    Basophils, Absolute 0.02 0.00 - 0.20 10*3/mm3    Immature Grans, Absolute 0.06 (H) 0.00 - 0.05 10*3/mm3    nRBC 0.0 0.0 - 0.2 /100 WBC   Light Blue Top    Collection Time: 01/24/20  2:54 PM   Result Value Ref Range    Extra Tube hold for add-on    Green Top (Gel)    Collection Time: 01/24/20  2:54 PM   Result Value Ref Range    Extra Tube Hold for add-ons.    Gold Top - SST    Collection Time: 01/24/20  2:54 PM   Result Value Ref Range    Extra Tube Hold for add-ons.    Urinalysis With Microscopic If Indicated (No Culture) - Urine, Clean Catch    Collection Time: 01/24/20  6:14 PM   Result Value Ref Range    Color, UA Yellow Yellow, Straw    Appearance, UA Turbid (A) Clear    pH, UA 6.0 5.0 - 8.0    Specific Gravity, UA 1.025 1.005 - 1.030    Glucose, UA Negative Negative    Ketones, UA Trace (A) Negative    Bilirubin, UA Negative Negative    Blood, UA Moderate (2+) (A) Negative    Protein,  mg/dL (2+) (A) Negative    Leuk Esterase, UA Moderate (2+) (A) Negative    Nitrite, UA Positive (A) Negative    Urobilinogen, UA 1.0 E.U./dL 0.2 - 1.0 E.U./dL   Urinalysis, Microscopic Only - Urine, Clean Catch    Collection Time: 01/24/20  6:14 PM   Result Value Ref Range    RBC, UA Unable to determine due to loaded field (A) None Seen, 0-2 /HPF    WBC, UA Too Numerous to Count (A) None Seen, 0-2  /HPF    Bacteria, UA 4+ (A) None Seen /HPF    Squamous Epithelial Cells, UA Unable to determine due to loaded field (A) None Seen, 0-2 /HPF    Hyaline Casts, UA Unable to determine due to loaded field None Seen /LPF    Methodology Automated Microscopy        Ordered the above labs and independently reviewed the results.     RADIOLOGY  Xr Chest 2 View    Result Date: 1/24/2020  AP AND LATERAL CHEST  HISTORY: Cough, COPD. History of non-Hodgkin lymphoma.  COMPARISON: None.  FINDINGS: The heart and mediastinal structures are within normal limits. There is blunting of both costophrenic angles associated with bilateral pleural effusions which appear small to moderate. Patchy linear opacity is present in the right lung base representing atelectasis or potential right basilar infiltrate. Linear atelectasis or scarring overlies the right middle lobe and lingula on the lateral view. There is no evidence for perihilar pulmonary edema or pneumothorax. Multilevel bridging endplate osteophyte formation is present in the thoracic spine.      Blunting of both costophrenic angles due to small to moderate bilateral pleural effusions. There is mild patchy opacity present in the right lung base due to atelectasis or infiltrate.  This report was finalized on 1/24/2020 4:44 PM by Dr. Nikolas Vargas M.D.        I ordered the above noted radiological studies and reviewed the images on the PACS system.  Reviewed by me and discussed with radiologist.  See dictation for official radiology interpretation.       MEDICATIONS GIVEN IN ER  Medications   potassium chloride (MICRO-K) CR capsule 40 mEq (40 mEq Oral Given 1/24/20 1903)   furosemide (LASIX) tablet 40 mg (40 mg Oral Given 1/24/20 1918)         PROGRESS, DATA ANALYSIS, CONSULTS AND MEDICAL DECISION MAKING  All labs have been independently reviewed by me.  All radiology studies have been reviewed by me and discussed with radiologist dictating the report.  EKG's independently viewed and  interpreted by me unless stated otherwise. Discussion below represents my analysis of pertinent findings related to patient's condition, differential diagnosis, treatment plan and final disposition.     ED Course as of Jan 24 1935 Fri Jan 24, 2020   1607 Improved from 9 days ago   Hemoglobin(!): 9.1 [EP]   1822 MDM: The skin rash on her back has been persistent, we will have her follow up with Dermatology for this.  She has other chronic medical problems for which she is followed in [place].  She routinely gets paracentesis at [facility].     [EP]   1824 DDx: cellulitis, skin ulcer, MRSA wound, skin breakdown.     [EP]   1825 Discussed plan of care with the patient.  The patient is agreeable to a wound care consult which I will provide her with.  I have told her I will give her a dose of potassium here then a dose of Lasix.  I will send her home on potassium replacement and have informed her she is to take potassium anytime she takes her home Lasix dose.  She is agreeable with this plan I also discussed doing Bactroban ointment to the right back/shoulder wound and she is in agreement.    [EP]   1912 Pt initially states she has no urinary symptoms then states she does maybe have some problems.  I will send urine culture and d/c on antibiotics.     [EP]   1919 MDM: Pt's skin wounds on back are not acute.  She is agreeable to wound care clinic.  She has obvious UTI but is not toxic, no WBC.  Other labs are stable for her.  I will replace potassium.      [EP]      ED Course User Index  [EP] Lidia Janeth Blake, APRN       1620:Reviewed pt's history and workup with Dr. Meadows.  After a bedside evaluation, Dr. Meadows agrees with the plan of care.    1923: The patient's history, physical exam, and lab findings were discussed with the physician, who also performed a face to face history and physical exam.  I discussed all results and noted any abnormalities with patient.  Discussed absoute need to recheck  "abnormalities with their family physician.  I answered any of the patient's questions.  Discussed plan for discharge, as there is no emergent indication for admission.  Pt is agreeable and understands need for follow up and repeat testing.  Pt is aware that discharge does not mean that nothing is wrong but it indicates no emergency is present and they must continue care with their family physician.  Pt is discharged with instructions to follow up with primary care doctor to have their blood pressure rechecked.         Disposition vitals:  /46   Pulse 82   Temp 98.2 °F (36.8 °C) (Tympanic)   Resp 16   Ht 157.5 cm (62\")   Wt 79.4 kg (175 lb)   LMP 09/11/2000   SpO2 100%   BMI 32.01 kg/m²       DIAGNOSIS  Final diagnoses:   Acute UTI   Other ascites, chronic   Multiple wounds of skin, right upper back       FOLLOW UP   [facility]  [address] [facility] [address] [phone]  Schedule an appointment as soon as possible for a visit in 1 week      Domi Cornejo, APRN  [address] [facility]  [address] [place] [address] [place] [facility]  [phone]    Schedule an appointment as soon as possible for a visit in 1 week        RX     Medication List      New Prescriptions    cephalexin 500 MG capsule  Commonly known as:  KEFLEX  Take 1 capsule by mouth 3 (Three) Times a Day for 7 days.     mupirocin 2 % nasal ointment  Commonly known as:  BACTROBAN  into the nostril(s) as directed by provider 2 (Two) Times a Day.     potassium chloride 20 MEQ CR tablet  Commonly known as:  K-DUR,KLOR-CON  Take 1 tablet by mouth 2 (Two) Times a Day for 7 days.        Changed    levothyroxine 50 MCG tablet  Commonly known as:  SYNTHROID  Take 1 tablet by mouth Daily.  What changed:  additional instructions                 Janeth Murillo APRN  01/24/20 1924       Janeth Murillo APRN  01/24/20 1935    "

## 2020-01-24 NOTE — ED PROVIDER NOTES
"Pt presents to the ED c/o two painful wounds on her right upper back that has been there for 1 year. Pt has seen her PCP and a dermatologist for these sores. She states they have been unable to treat these wounds. The dermatologist prescribed her Bensal HP ointment, which she has been using for 1 month. Pt is requesting antibiotics to \"heal her wounds\". She has not other complaints.     On exam,  Pt in NAD  Heart--RRR, no mumur  Lungs-CTAB  Abd-normal bowel sounds, soft, positive ascites   Back-Two wounds on her posterior right shoulder measuring 2cm, granulation tissue present, mild surrounding erythema, TTP    Plan-Labs and CXR ordered      Attestation:  The SOL and I have discussed this patient's history, physical exam, and treatment plan.  I have reviewed the documentation and personally had a face to face interaction with the patient. I affirm the documentation and agree with the treatment and plan.  The attached note describes my personal findings.      Documentation assistance provided by jennifer Pepe for Dr. Meadows. Information recorded by the nasime was done at my direction and has been verified and validated by me.       Angy Pepe  01/24/20 1701       Jacinto Meadows MD  01/24/20 8996    "

## 2020-01-24 NOTE — ED NOTES
"Pt was picked up from [place].  Pt has a cyst on the right side of her back, from which she thinks she \"is coughing up sputum from.\" Pt is convinced she is septic, and wanted to be transported for further eval.      Tanvi Prajapati, RN  01/24/20 6104    "

## 2020-01-27 NOTE — TELEPHONE ENCOUNTER
She is wanting to be a direct admit to the hospital. Feet and legs are so swollen and she can hardly walk.

## 2020-01-31 PROBLEM — K74.60 CIRRHOSIS (HCC): Chronic | Status: ACTIVE | Noted: 2019-01-01

## 2020-01-31 PROBLEM — T83.84XA PAIN DUE TO URETERAL STENT (HCC): Chronic | Status: ACTIVE | Noted: 2020-01-01

## 2020-01-31 PROBLEM — R06.02 SHORTNESS OF BREATH: Status: ACTIVE | Noted: 2020-01-01

## 2020-01-31 PROBLEM — N17.9 ACUTE ON CHRONIC RENAL FAILURE (HCC): Chronic | Status: ACTIVE | Noted: 2019-01-01

## 2020-01-31 PROBLEM — I95.89 CHRONIC HYPOTENSION: Chronic | Status: ACTIVE | Noted: 2020-01-01

## 2020-01-31 PROBLEM — E66.9 OBESITY: Chronic | Status: ACTIVE | Noted: 2019-01-01

## 2020-01-31 PROBLEM — E03.9 HYPOTHYROIDISM: Chronic | Status: ACTIVE | Noted: 2019-01-01

## 2020-01-31 PROBLEM — K21.9 GASTROESOPHAGEAL REFLUX DISEASE: Chronic | Status: ACTIVE | Noted: 2019-04-17

## 2020-01-31 PROBLEM — I50.9 CHF (CONGESTIVE HEART FAILURE) (HCC): Chronic | Status: ACTIVE | Noted: 2019-01-01

## 2020-01-31 PROBLEM — D63.8 ANEMIA OF CHRONIC DISEASE: Chronic | Status: ACTIVE | Noted: 2020-01-01

## 2020-01-31 PROBLEM — N39.0 ACUTE UTI (URINARY TRACT INFECTION): Status: ACTIVE | Noted: 2020-01-01

## 2020-01-31 PROBLEM — E87.5 HYPERKALEMIA: Chronic | Status: ACTIVE | Noted: 2020-01-01

## 2020-01-31 PROBLEM — N18.9 ACUTE ON CHRONIC RENAL FAILURE (HCC): Chronic | Status: ACTIVE | Noted: 2019-01-01

## 2020-01-31 PROBLEM — K76.6 HYPERTENSION, PORTAL (HCC): Chronic | Status: ACTIVE | Noted: 2019-01-01

## 2020-01-31 PROBLEM — S41.009A: Chronic | Status: ACTIVE | Noted: 2020-01-01

## 2020-01-31 PROBLEM — E54 VITAMIN C DEFICIENCY: Chronic | Status: ACTIVE | Noted: 2020-01-01

## 2020-01-31 PROBLEM — N18.30 CRF (CHRONIC RENAL FAILURE), STAGE 3 (MODERATE) (HCC): Chronic | Status: ACTIVE | Noted: 2019-01-01

## 2020-02-01 PROBLEM — I82.403 ACUTE DEEP VEIN THROMBOSIS (DVT) OF BOTH LOWER EXTREMITIES (HCC): Status: ACTIVE | Noted: 2020-01-01

## 2020-02-02 PROBLEM — R91.1 NODULE OF UPPER LOBE OF LEFT LUNG: Status: ACTIVE | Noted: 2020-01-01

## 2020-02-04 PROBLEM — F06.30 MOOD DISORDER DUE TO A GENERAL MEDICAL CONDITION: Status: ACTIVE | Noted: 2020-01-01

## 2020-02-04 PROBLEM — F41.1 GENERALIZED ANXIETY DISORDER: Status: ACTIVE | Noted: 2020-01-01

## 2020-02-07 PROBLEM — I50.33 ACUTE ON CHRONIC DIASTOLIC HEART FAILURE (HCC): Status: ACTIVE | Noted: 2020-01-01

## 2020-02-07 PROBLEM — N17.9 ACUTE ON CHRONIC RENAL FAILURE (HCC): Chronic | Status: RESOLVED | Noted: 2019-01-01 | Resolved: 2020-01-01

## 2020-02-07 PROBLEM — K76.6 HYPERTENSION, PORTAL (HCC): Chronic | Status: RESOLVED | Noted: 2019-01-01 | Resolved: 2020-01-01

## 2020-02-07 PROBLEM — N18.9 ACUTE ON CHRONIC RENAL FAILURE (HCC): Chronic | Status: RESOLVED | Noted: 2019-01-01 | Resolved: 2020-01-01

## 2020-02-07 PROBLEM — N39.0 ACUTE UTI (URINARY TRACT INFECTION): Status: RESOLVED | Noted: 2020-01-01 | Resolved: 2020-01-01

## 2020-02-07 PROBLEM — R06.02 SHORTNESS OF BREATH: Status: RESOLVED | Noted: 2020-01-01 | Resolved: 2020-01-01

## 2020-02-08 NOTE — OUTREACH NOTE
Prep Survey      Responses   Facility patient discharged from?  [facility]   Is patient eligible?  Yes   Discharge diagnosis  CHF and acute UTI   Does the patient have one of the following disease processes/diagnoses(primary or secondary)?  CHF   Does the patient have Home health ordered?  Yes   What is the Home health agency?   [facility]   Is there a DME ordered?  No   Prep survey completed?  Yes          Teaj Garcia RN

## 2020-02-10 NOTE — TELEPHONE ENCOUNTER
Yissel kevin/[facility] [phone]    Patient D/C'd from [facility] on Fri, admitted to  on Sun  Has a wound on her should that the patient thinks is MRSA because she has had a chronic issue with it. Does have some oozing. Also thinks UTI is coming back because urine is dark, no other symptoms. Low grade temp of 99.0.      Do you want to start abx?

## 2020-02-10 NOTE — OUTREACH NOTE
"CHF Week 1 Survey      Responses   Facility patient discharged from?  [facility]   Does the patient have one of the following disease processes/diagnoses(primary or secondary)?  CHF   Is there a successful TCM telephone encounter documented?  No   CHF Week 1 attempt successful?  Yes   Call start time  1347   Call end time  1352   Meds reviewed with patient/caregiver?  Yes   Is the patient having any side effects they believe may be caused by any medication additions or changes?  No   Does the patient have all medications ordered at discharge?  Yes   Is the patient taking all medications as directed (includes completed medication regime)?  Yes   Does the patient have a primary care provider?   Yes   Does the patient have an appointment with their PCP within 7 days of discharge?  No   Comments regarding PCP  THIS NURSE OFFERED TO MAKE PATIENT'S PCP FOLLOW UP APPOINTMENT. PATIENT STATES, \"I CAN DO IT MYSELF.\"    What is preventing the patient from scheduling follow up appointments within 7 days of discharge?  Haven't had time   Nursing Interventions  Educated patient on importance of making appointment   What is the Home health agency?   [facility]   Has home health visited the patient within 72 hours of discharge?  Yes   Did the patient receive a copy of their discharge instructions?  Yes   Additional teach back comments  PATIENT VERY BELLIGERENT VOICING SEVERAL COMPLAINTS. UNABLE TO DO TEACHING DURING THIS CALL DUE TO PATIENT BEING VERY BELIGERENT AND VOICING COMPLAINTS.     CHF Week 1 call completed?  Yes   Wrap up additional comments  PATIENT VERY LOUD AND VOICING MANY COMPLAINTS DURING THIS CALL. PATIENT COMOLAINED HER CATHETER CAUSED PAIN DUE TO BEING \"IN THE URETER.\" PATIENT COMPAINED ABOUT NURSING STAFF NOT BELIEVING HER \"PAIN LEVEL WAS AN 11 OR 12.\" PATIENT ALSO COMPLAINED HER NURSE \"KNOCKED HER OUT WITH 2MG OF DILAUDID.\" EMAIL SENT TO [facility], PATIENT [facility].           Rosa Arnold LPN  "

## 2020-02-11 NOTE — TELEPHONE ENCOUNTER
No I would rather they culture her wound and get a urinalysis if they can before we start anything

## 2020-02-17 NOTE — OUTREACH NOTE
CHF Week 2 Survey      Responses   Facility patient discharged from?  [facility]   Does the patient have one of the following disease processes/diagnoses(primary or secondary)?  CHF   Week 2 attempt successful?  No   Unsuccessful attempts  Attempt 1 [NO ANSWER, NO VM]          Rosa Arnold LPN

## 2020-02-18 NOTE — OUTREACH NOTE
CHF Week 2 Survey      Responses   Facility patient discharged from?  [facility]   Does the patient have one of the following disease processes/diagnoses(primary or secondary)?  CHF   Week 2 attempt successful?  No   Unsuccessful attempts  Attempt 2 [NO ANSWER, NO VM]          Rosa Arnold LPN

## 2020-02-20 NOTE — PROGRESS NOTES
"Subjective   Kathie Cohen is a 69 y.o. female.   CC: Establish care, coughing up dark matter    Patient presents to establish care.  This is a 69-year-old female former patient of Dr. Clayton.  She is chronically ill with multiple comorbidities and a complex medical history.  Most recently had a hospitalization requiring paracentesis and IVC filter placement, newly diagnosed with bilateral DVTs.  She has an oncologist for history of multiple myeloma.  Also with a history of nonalcoholic liver cirrhosis.  She has a psychiatrist as well.  She presents today complaining that \"no one is taking me seriously when I am telling them that I am spitting up worms.\"  She has been coughing and producing \"dark bits that are worms.\"  Previous PCP send a sample for tissue pathology report which came back showing vegetable material.  Patient reports that she refuses to accept that.  She states \"it was wiggling around a few days ago and I know that it is worm.\"  She does state that she has trouble swallowing at times and often gets choked on food.  Additionally she has a wound on her right shoulder that she would like redressed today.  She reports that home health nurses are coming out weekly to change this and her daughter is changing it to other times per week.  She denies fever or chills.  She denies development of any other new issues today.       The following portions of the patient's history were reviewed and updated as appropriate: allergies, current medications, past family history, past medical history, past social history, past surgical history and problem list.    Review of Systems   Constitutional: Positive for fatigue. Negative for activity change, chills, fever, unexpected weight gain and unexpected weight loss.   HENT: Negative for congestion, hearing loss, postnasal drip, sinus pressure, sneezing, sore throat, swollen glands and tinnitus.    Eyes: Negative for photophobia, pain and visual disturbance.   Respiratory: " "Positive for cough. Negative for chest tightness, shortness of breath and wheezing.    Cardiovascular: Negative for chest pain, palpitations and leg swelling.   Gastrointestinal: Negative for abdominal distention, abdominal pain, constipation, diarrhea, nausea and vomiting.   Endocrine: Negative for polydipsia, polyphagia and polyuria.   Genitourinary: Negative for dysuria, frequency, hematuria and urgency.   Skin: Positive for skin lesions.   Neurological: Negative for dizziness, weakness, numbness and headache.   All other systems reviewed and are negative.      Objective    /62 (BP Location: Right arm, Patient Position: Sitting, Cuff Size: Adult)   Pulse 81   Temp 97.8 °F (36.6 °C) (Oral)   Resp 16   Ht 157.5 cm (62\")   Wt 67.1 kg (148 lb)   LMP 09/11/2000   SpO2 98%   BMI 27.07 kg/m²     Physical Exam   Constitutional: She is oriented to person, place, and time. She appears well-developed and well-nourished. No distress.   HENT:   Head: Normocephalic and atraumatic.   Eyes: Pupils are equal, round, and reactive to light. EOM are normal.   Neck: Normal range of motion. Neck supple.   Cardiovascular: Normal rate, regular rhythm, normal heart sounds and intact distal pulses. Exam reveals no gallop and no friction rub.   No murmur heard.  Pulmonary/Chest: Effort normal and breath sounds normal. No stridor. No respiratory distress. She has no wheezes. She has no rales. She exhibits no tenderness.   Lungs CTA bilaterally   Abdominal: Soft. Bowel sounds are normal. She exhibits no distension. There is no tenderness.   Musculoskeletal: Normal range of motion.   Neurological: She is alert and oriented to person, place, and time.   Skin: Skin is warm and dry. Capillary refill takes less than 2 seconds. She is not diaphoretic.   3 separate circular, scabbed lesions to right posterior shoulder.  No surrounding erythema.   Psychiatric: She has a normal mood and affect. Her behavior is normal. Judgment and " thought content normal.   Nursing note and vitals reviewed.    Current outpatient and discharge medications have been reconciled for the patient.  Reviewed by: ANGIE Vega      Assessment/Plan   Kathie was seen today for establish care.    Diagnoses and all orders for this visit:    Encounter to establish care    Dysphagia, unspecified type  -     FL Video Swallow With Speech    Expectoration of abnormal sputum  -     FL Video Swallow With Speech    Wound of right shoulder, subsequent encounter    -Reviewed previous providers notes.  I reviewed the tissue pathology report from the sample she is spitting up.  She brought another sample today and it appears to be plant matter as well.  The tissue pathology showed what is most likely a vegetable.  I explained that this may be a result of aspiration as she does struggle with dysphasia from time to time and I have ordered video swallow study with speech therapy.    -Redressed her right shoulder wounds.  She is being followed by wound care and her daughter is changing it every other day per patient.  It is scabbed over and I see no evidence of surrounding soft tissue infection.    -Follow-up PRN and I will see her back in 4 months routine health maintenance/follow-up on chronic conditions.

## 2020-02-20 NOTE — TELEPHONE ENCOUNTER
"ON CALL NOTE:      Pt. contacted the answering service stating she coughed up \"something black and wiggling\".  She is unsure if it is blood.  She said she did something similar while in the hospital recently and \"no one cultured anything\".  She says she was told in the office that \"it's just food\" but she knows it is not.    I have advised her to go to the ER for evaluation.  She states \"I'm not going to that ER. I'm too sick and will get something.  I want to be seen in the office tomorrow\".  I have told her I am unable to schedule her an appointment tonight but she can contact the office in the morning for an appointment.  I did again recommend the ER tonight if symptoms persist.    She states she will call the office in the morning.   "

## 2020-02-20 NOTE — TELEPHONE ENCOUNTER
"Pt came in today, she still believes that maggots are eating her up inside.  I spoke to Dr Mijares, she printed out test results for me to give her which show no organisms in her system.  When I gave her this information, she became irate, refused to believe that to be true, and demanded \"What is she supposed to do!\"  She has brought in another sample, which we are not going to test.  She is extremely upset.  Could someone please call pt at [phone]?   "

## 2020-02-20 NOTE — TELEPHONE ENCOUNTER
Two attempts were made to call patient at the listed number that she provided.    Received a message that call could not be completed after several rings.

## 2020-02-24 NOTE — OUTREACH NOTE
CHF Week 3 Survey      Responses   Facility patient discharged from?  [facility]   Does the patient have one of the following disease processes/diagnoses(primary or secondary)?  CHF   Week 3 attempt successful?  Yes   Call start time  1314   Rescheduled  Rescheduled-pt requested [Son requested callback to patient #. ]   Call end time  1317   General alerts for this patient  Son requested follow up calls to patient #.    Discharge diagnosis  CHF and acute UTI   Is patient permission given to speak with other caregiver?  Yes   List who call center can speak with  SonJavi   Person spoke with today (if not patient) and relationship  son, Javi Ventura, RN

## 2020-02-25 NOTE — OUTREACH NOTE
"CHF Week 3 Survey      Responses   Facility patient discharged from?  [facility]   Does the patient have one of the following disease processes/diagnoses(primary or secondary)?  CHF   Week 3 attempt successful?  Yes   Call start time  0928   Call end time  0928   CHF Week 3 call completed?  Yes   Revoked  No further contact(revokes)-requires comment   Graduated/Revoked comments  AFTER THIS NURSE IDENTIFIED REASON FOR CALL, PATIENT BEGAN TO VOICE LOUDLY THAT SHE CANNOT TALK, DUE TO GOING TO A \"SPEECH EVALUATION\" TODAY.           Rosa Arnold LPN  "

## 2020-02-25 NOTE — MBS/VFSS/FEES
Outpatient Speech Language Pathology   Adult Swallow Initial Evaluation  [facility]     Patient Name: Kathie Cohen  : 1950  MRN: [id number]  Today's Date: 2020         Visit Date: 2020   Patient Active Problem List   Diagnosis   • Pancytopenia (CMS/HCC)   • Cirrhosis (CMS/HCC)   • Obesity   • Gastroesophageal reflux disease   • Hypothyroidism   • Personal history of other malignant neoplasms of lymphoid, hematopoietic and related tissues   • Skin disorder   • Iron deficiency anemia due to chronic blood loss   • Microcytic anemia   • Gastroesophageal reflux disease without esophagitis   • Adverse effect of iron   • Solitary lung nodule   • Chronic hypotension   • Wound of shoulder   • Vitamin C deficiency   • Anemia of chronic disease   • Pain due to ureteral stent (CMS/HCC)   • Hyperkalemia   • Acute deep vein thrombosis (DVT) of both lower extremities (CMS/HCC)   • Nodule of upper lobe of left lung   • Mood disorder due to a general medical condition   • Generalized anxiety disorder   • Acute on chronic diastolic heart failure (CMS/HCC)        Past Medical History:   Diagnosis Date   • Arthritis    • Cancer (CMS/HCC) 2011    non hodgens lymphoma   • CHF (congestive heart failure) (CMS/HCC)     UNCERTAIN   • Cirrhosis (CMS/HCC)    • Constipation    • COPD (chronic obstructive pulmonary disease) (CMS/HCC)     denies   • CRF (chronic renal failure), stage 3 (moderate) (CMS/HCC)    • GERD (gastroesophageal reflux disease)     RARELY   • History of colon polyps    • History of non-Hodgkin's lymphoma 2011    Treated in    • Hypertension    • Hypothyroidism    • CURIEL (nonalcoholic steatohepatitis)    • Nodule of upper lobe of left lung 2020   • Non-healing skin lesion     RIGHT CHEST   • Obesity    • Pancytopenia (CMS/HCC)    • Peripheral edema    • Portal hypertension (CMS/HCC)    • Seasonal allergies    • Skin lesions     COMES AND GOES CURRENT ON BACK OF NECK   • Ureteral obstruction      SECONDARY TO CHEMO        Past Surgical History:   Procedure Laterality Date   • CATARACT EXTRACTION, BILATERAL     • CHOLECYSTECTOMY     • COLONOSCOPY     • CYSTOSCOPY W/ URETERAL STENT PLACEMENT      CHANGE STENT  EVERY 3 MONTHS   • LIVER BIOPSY     • MEDIPORT INSERTION, SINGLE     • MEDIPORT REMOVAL     • SKIN LESION EXCISION N/A 7/24/2019    Procedure: Excision right chest skin lesion 4.5cm excised diameter, Excision posterior neck skin lesion 1.5 cm excised diameter;  Surgeon: Frederick Ryan MD;  Location: [facility] MAIN OR;  Service: General   • TONSILLECTOMY     • TUBAL ABDOMINAL LIGATION     • URETERAL EXPLORATION      ATTEMPT TO REPAIR OBST SECONDARY TO CHEMO         Visit Dx:     ICD-10-CM ICD-9-CM   1. Dysphagia, unspecified type R13.10 787.20   2. Expectoration of abnormal sputum R09.3 786.4           SLP Adult Swallow Evaluation     Row Name 02/25/20 1600       Rehab Evaluation    Document Type  evaluation  -AW    Subjective Information  no complaints  -AW    Patient Observations  alert;cooperative;agree to therapy  -AW    Patient Effort  good  -AW    Symptoms Noted During/After Treatment  none  -AW       General Information    Patient Profile Reviewed  yes  -AW    Pertinent History Of Current Problem  Pt referred to r/o aspiration. Pt reported no problems swallowing. Pt reported she coughs frequently. Pt reported the need for frequent oral care due to food gathering in buccal space from gum surgery 2 years ago. Pt has a history of multiple myeloma and has recently been coughing up dark bits of plant based material.   -AW    Current Method of Nutrition  regular textures;thin liquids  -AW    Precautions/Limitations, Vision  WFL;for purposes of eval  -AW    Precautions/Limitations, Hearing  WFL;for purposes of eval  -AW    Prior Level of Function-Communication  WFL  -AW    Prior Level of Function-Swallowing  no diet consistency restrictions  -AW    Plans/Goals Discussed with  patient;agreed upon  -AW     Barriers to Rehab  none identified  -AW    Patient's Goals for Discharge  patient did not state  -AW       Pain Assessment    Additional Documentation  Pain Scale: Numbers Pre/Post-Treatment (Group)  -AW       Pain Scale: Numbers Pre/Post-Treatment    Pain Scale: Numbers, Pretreatment  0/10 - no pain  -AW    Pain Scale: Numbers, Post-Treatment  0/10 - no pain  -AW       Oral Motor and Function    Dentition Assessment  natural, present and adequate  -AW    Secretion Management  WNL/WFL  -AW    Mucosal Quality  moist, healthy  -AW       Oral Musculature and Cranial Nerve Assessment    Oral Motor General Assessment  WFL  -AW       General Eating/Swallowing Observations    Respiratory Support Currently in Use  room air  -AW    Eating/Swallowing Skills  fed by SLP;self-fed  -AW    Positioning During Eating  upright in chair  -AW       MBS/VFSS    Utensils Used  spoon;cup;straw  -AW    Consistencies Trialed  regular textures;soft textures;pureed;thin liquids;honey-thick liquids mixed  -AW       MBS/VFSS Interpretation    Oral Prep Phase  WFL  -AW    Oral Transit Phase  impaired  -AW    Oral Residue  WFL  -AW    VFSS Summary  VFSS was completed. Patient was upright in a chair, alert and cooperative. Pt presented with mild oropharyngeal dysphagia characterized by posterior spillage and mistiming of the swallow.  With thin by cup/straw and soft consistencies, the bolus spilled to the pyriforms. With puree and regular consistencies, posterior spillage to the valleculae was noted. Trace transient penetration occurred with thin liquid by cup in 1/5 trials and mechanical soft/mixed in 1/3 trials due to mistiming of the swallow. Esophageal scan displayed slow clearance in the lower 1/3 with retrograde movement.   -AW       Initiation of Pharyngeal Swallow    Pharyngeal Phase  impaired pharyngeal phase of swallowing  -AW       SLP Communication to Radiology    Summary Statement  VFSS was completed. Patient was upright in a chair,  alert and cooperative. Pt presented with mild oropharyngeal dysphagia characterized by posterior spillage and mistiming of the swallow.  With thin by cup/straw and soft consistencies, the bolus spilled to the pyriforms. With puree and regular consistencies, posterior spillage to the valleculae was noted. Trace transient penetration occurred with thin liquid by cup in 1/5 trials and mechanical soft/mixed in 1/3 trials due to mistiming of the swallow. Esophageal scan displayed slow clearance in the lower 1/3 with retrograde movement.   -AW       Clinical Impression    SLP Swallowing Diagnosis  mild;oral dysfunction;pharyngeal dysfunction  -AW    Functional Impact  risk of aspiration/pneumonia  -AW    Swallow Criteria for Skilled Therapeutic Interventions Met  no problems identified which require skilled intervention  -AW       Recommendations    Therapy Frequency (Swallow)  evaluation only  -AW    SLP Diet Recommendation  regular textures;thin liquids  -AW    Recommended Precautions and Strategies  upright posture during/after eating;small bites of food and sips of liquid;volitional throat clear  -AW    SLP Rec. for Method of Medication Administration  meds whole;with thin liquids  -AW    Monitor for Signs of Aspiration  yes  -AW      User Key  (r) = Recorded By, (t) = Taken By, (c) = Cosigned By    Initials Name Provider Type    AW Tonie Torres MS CCC-SLP Speech and Language Pathologist                        OP SLP Education     Row Name 02/25/20 1591       Education    Barriers to Learning  No barriers identified  -AW    Education Provided  Described results of evaluation;Patient expressed understanding of evaluation;Patient participated in establishing goals and treatment plan;Patient demonstrated recommended strategies  -AW    Assessed  Learning needs;Learning motivation;Learning preferences;Learning readiness  -AW    Learning Motivation  Strong  -AW    Learning Method  Explanation;Demonstration;Teach  back;Written materials  -AW    Teaching Response  Verbalized understanding;Demonstrated understanding  -AW      User Key  (r) = Recorded By, (t) = Taken By, (c) = Cosigned By    Initials Name Effective Dates    Tonie Oh MS CCC-SLP 06/08/18 -               OP SLP Assessment/Plan - 02/25/20 1658        SLP Assessment    Functional Problems  Swallowing   -AW    Impact on Function: Swallowing  Risk of aspiration   -AW    Clinical Impression: Swallowing  Mild:;oropharyngeal phase dysphagia   -AW    Prognosis  Good (comment)   -AW    Patient/caregiver participated in establishment of treatment plan and goals  Yes   -AW    Patient would benefit from skilled therapy intervention  No   -AW      User Key  (r) = Recorded By, (t) = Taken By, (c) = Cosigned By    Initials Name Provider Type    Tonie Oh MS CCC-SLP Speech and Language Pathologist              SLP Outcome Measures (last 72 hours)      SLP Outcome Measures     Row Name 02/25/20 1600             SLP Outcome Measures    Outcome Measure Used?  Adult NOMS  -AW         Adult FCM Scores    FCM Chosen  Swallowing  -AW      Swallowing FCM Score  7  -AW        User Key  (r) = Recorded By, (t) = Taken By, (c) = Cosigned By    Initials Name Effective Dates    Tonie Oh MS CCC-SLP 06/08/18 -                Time Calculation:   SLP Start Time: 1300  SLP Stop Time: 1415  SLP Time Calculation (min): 75 min    Therapy Charges for Today     Code Description Service Date Service Provider Modifiers Qty    53359422059 HC ST MOTION FLUORO EVAL SWALLOW 5 2/25/2020 Tonie Torres MS CCC-SLP GN 1                   Tonie Torres MS CCC-SLP  2/25/2020

## 2020-02-27 NOTE — PROGRESS NOTES
Please notify patient that her swallow study came back stable. I have read over the speech therapist's recommendations. She had some slowed swallowing at times and needs to make sure that she is sitting up straight when eating or drinking, and I would recommend remaining upright for at least 30 min after eating or drinking anything. Please take small bites and chew thoroughly. She has some risk of retained food related to her prior gum surgery as well. She needs to make sure she is following up with her dentist regularly as this could be a contributor to her coughing up food particles.

## 2020-03-17 PROBLEM — N13.30 HYDROURETERONEPHROSIS: Status: ACTIVE | Noted: 2020-01-01

## 2020-03-17 PROBLEM — Z85.72 HISTORY OF NON-HODGKIN'S LYMPHOMA: Status: ACTIVE | Noted: 2020-01-01

## 2020-03-17 PROBLEM — H91.90 HEARING LOSS: Status: ACTIVE | Noted: 2020-01-01

## 2020-03-17 PROBLEM — K75.81 LIVER CIRRHOSIS SECONDARY TO NASH (HCC): Chronic | Status: ACTIVE | Noted: 2019-01-01

## 2020-03-17 NOTE — TELEPHONE ENCOUNTER
Patient requested that I call her daughter to let her know what happened with her blood sugar dropping. I explained to Gretchen what happened and that her mom was stable and being picked up by her son. Patient wanted Gretchen to call her PCP to make her an appt. Patient is rescheduled for PET on Thursday.

## 2020-03-17 NOTE — PROGRESS NOTES
Dr. Schmidt called and said patient was downstairs in PET/CT with a blood sugar of 37. She wanted to know if we could give D50. I informed her we do not have D50 but we have glucogel. She ordered to go ahead and give her that. Shelbie Jha RN and I went down to help care for the patient. She is alert and verbal. She is diaphoretic and shaking. Glucogel given and IV started. 15 minutes after first dose patient's BS went from 37 to 22. Dr. Schmidt notified of blood sugar dropping. Gave second dose and called 911. D5NS bolus started. When EMT arrived they rechecked her BS and it was 125. Vitals where stable throughout incident. Patient does not want to go to the hospital. EMT evaluated her and assessed her orientation and she is stable. Patient instructed not to drive herself home so her son was called to come get her. He will bring her something to eat.

## 2020-03-19 NOTE — TELEPHONE ENCOUNTER
----- Message from ANGIE Vega sent at 3/19/2020  3:19 PM EDT -----  Regarding: Nasal congestion   I got a message from Dr. Clayton that she recently saw the patient who asked her to forward me a message that she has nasal congestion and a cough. Please let her know that I am sending in Flonase and promethazine DM and to let me know if symptoms persist or worsen. I'd advise Mucinex OTC as well.

## 2020-03-19 NOTE — TELEPHONE ENCOUNTER
Unable to reach the pt on her number  Called ER contact and left a message to have her return my call

## 2020-03-20 NOTE — PROGRESS NOTES
Called pt and phone number states that the wireless customer is not currently available please try later. Message sent to scheduling to schedule MD appt.

## 2020-03-20 NOTE — TELEPHONE ENCOUNTER
ANGE FROM Psychiatric CALLED STATING THAT SHE SPOKE TO THE PATIENT'S DAUGHTER ABOUT 5 MINUTES AGO, AND SHE WAS INFORMED THAT THE PATIENT, CAMI HILL, STARTING EXPERIENCING SOME NAUSEA AND VOMITING LAST NIGHT (3/19/20). ANGE STATED THAT THE PATIENT'S DAUGHTER INFORMED HER THAT THE PATIENT IS STILL EXPERIENCING SOME NAUSEA TODAY (3/20/20), AND SHE REQUESTED TO HAVE A PRESCRIPTION WRITTEN FOR THE PATIENT TO ALLEVIATE HER SYMPTOMS. ANGE STATED THAT THE PATIENT'S DAUGHTER INFORMED HER THAT SHE DOES NOT KNOW WHAT THE CAUSE OF THE NAUSEA AND VOMITING MAY BE.    I CONFIRMED THE CORRECT PHARMACY WITH ANGE TO BE KEEGAN IN [place] [facility].    IF THERE ARE ANY QUESTIONS OR CONCERNS PLEASE CALL ANGE FROM Psychiatric [phone] OR THE PHARMACY AT [facility] [phone].

## 2020-03-20 NOTE — TELEPHONE ENCOUNTER
Please inform home health/patient's daughter that I sent in oral disintegrating zofran. Please monitor symptoms closely and remain well hydrated. Please let me know if symptoms persist or worsen.

## 2020-03-23 PROBLEM — C82.90 NHL (NODULAR HISTIOCYTIC LYMPHOMA) (HCC): Status: ACTIVE | Noted: 2020-01-01

## 2020-03-23 PROBLEM — Z86.718 HISTORY OF DVT (DEEP VEIN THROMBOSIS): Status: ACTIVE | Noted: 2020-01-01

## 2020-03-23 PROBLEM — R11.2 NAUSEA AND VOMITING: Status: ACTIVE | Noted: 2020-01-01

## 2020-03-23 PROBLEM — Z66 DNR (DO NOT RESUSCITATE): Status: ACTIVE | Noted: 2020-01-01

## 2020-03-23 PROBLEM — D63.8 ANEMIA, CHRONIC DISEASE: Status: ACTIVE | Noted: 2020-01-01

## 2020-03-23 PROBLEM — R91.8 MASS OF LEFT LUNG: Status: ACTIVE | Noted: 2020-01-01

## 2020-03-23 PROBLEM — K92.0 HEMATEMESIS: Status: ACTIVE | Noted: 2020-01-01

## 2020-03-23 PROBLEM — R10.9 ABDOMINAL PAIN: Status: ACTIVE | Noted: 2020-01-01

## 2020-03-23 PROBLEM — N18.30 CKD (CHRONIC KIDNEY DISEASE) STAGE 3, GFR 30-59 ML/MIN (HCC): Status: ACTIVE | Noted: 2020-01-01

## 2020-03-23 NOTE — TELEPHONE ENCOUNTER
Unable to reach Pt on listed phone number,    LVM on son's phone, Mr Buenrostro. Advising Pt needs to proceed to the ER.     Yissel from [facility] was advised to inform Pt as well. Yissel stated she would let the daughter know.

## 2020-03-23 NOTE — TELEPHONE ENCOUNTER
"Yissel called from [facility] and stated that the Pt is still having symptoms of nausea and vomiting, Pt is vomiting \"black substance \"    Please advise?  "

## 2020-03-24 PROBLEM — Z87.19 HISTORY OF ESOPHAGEAL VARICES: Status: ACTIVE | Noted: 2020-01-01

## 2020-03-24 PROBLEM — K43.6 VENTRAL HERNIA WITH OBSTRUCTION AND WITHOUT GANGRENE: Status: ACTIVE | Noted: 2020-01-01

## 2020-03-24 PROBLEM — K92.0 HEMATEMESIS WITH NAUSEA: Status: ACTIVE | Noted: 2020-01-01

## 2020-03-24 NOTE — ANESTHESIA POSTPROCEDURE EVALUATION
"Patient: Kathie Cohen    Procedure Summary     Date:  03/24/20 Room / Location:  [facility] OR [address] [place] [address] MAIN OR    Anesthesia Start:  1435 Anesthesia Stop:  1609    Procedure:  LAPAROTOMY EXPLORATORY REPAIR OF INCARCERATED VENTRAL HERNIA (N/A Abdomen) Diagnosis:      Surgeon:  Christopher Lu MD Provider:  Javi Topete MD    Anesthesia Type:  general ASA Status:  4 - Emergent          Anesthesia Type: general    Vitals  Vitals Value Taken Time   BP 95/59 3/24/2020  4:45 PM   Temp 36.8 °C (98.2 °F) 3/24/2020  4:05 PM   Pulse 93 3/24/2020  5:01 PM   Resp 18 3/24/2020  4:45 PM   SpO2 100 % 3/24/2020  5:01 PM   Vitals shown include unvalidated device data.        Post Anesthesia Care and Evaluation    Patient location during evaluation: bedside  Patient participation: complete - patient participated  Level of consciousness: awake  Pain management: adequate  Airway patency: patent  Anesthetic complications: No anesthetic complications    Cardiovascular status: acceptable  Respiratory status: acceptable  Hydration status: acceptable    Comments: BP 95/59   Pulse 85   Temp 36.8 °C (98.2 °F) (Oral)   Resp 18   Ht 157.5 cm (62\")   Wt 59.6 kg (131 lb 6.4 oz)   LMP 09/11/2000   SpO2 100%   BMI 24.03 kg/m²       "

## 2020-03-24 NOTE — ANESTHESIA PREPROCEDURE EVALUATION
Anesthesia Evaluation     Patient summary reviewed and Nursing notes reviewed   no history of anesthetic complications:  NPO Solid Status: > 8 hours  NPO Liquid Status: > 2 hours           Airway   Mallampati: II  TM distance: >3 FB  Neck ROM: full  No difficulty expected  Dental - normal exam     Pulmonary     breath sounds clear to auscultation  (+) COPD,   Cardiovascular   Exercise tolerance: good (4-7 METS)    ECG reviewed  Rhythm: regular  Rate: normal    (+) hypertension, CHF , DVT,     ROS comment: Echo reviewed    Neuro/Psych  GI/Hepatic/Renal/Endo    (+)  GERD,  liver disease, renal disease,     Musculoskeletal     Abdominal    Substance History      OB/GYN          Other   blood dyscrasia anemia,   history of cancer                    Anesthesia Plan    ASA 4 - emergent     general     intravenous induction     Anesthetic plan, all risks, benefits, and alternatives have been provided, discussed and informed consent has been obtained with: patient.    Plan discussed with CRNA.

## 2020-04-01 NOTE — PLAN OF CARE
Problem: Patient Care Overview  Goal: Plan of Care Review  Outcome: Ongoing (interventions implemented as appropriate)  Flowsheets (Taken 4/1/2020 0612)  Progress: improving  Plan of Care Reviewed With: patient   Pt had a shift with no new issues, pt complained of constipation and asked to retrieve her own stool while she was on the bedside commode, pt did have a very small bowel movement, pt surgical wound in place with staples and unchanged, will continue to monitor

## 2020-04-01 NOTE — PROGRESS NOTES
"    Name: Kathie Cohen ADMIT: 3/23/2020   : 1950  PCP: Heidi Manrique APRN    MRN: [id number] LOS: 9 days   AGE/SEX: 70 y.o. female  ROOM: [facility]     Subjective   Subjective   \"Feel like I have an infection\". Reports dysuria and lt ear pain. Denies fever, dyspnea. On room air; sats 92-94%. Feels tired; slept well last night.    Review of Systems   Constitutional: Positive for fatigue. Negative for fever.   HENT: Positive for ear pain.    Respiratory: Negative.    Cardiovascular: Negative.    Gastrointestinal: Positive for abdominal distention and abdominal pain.   Genitourinary: Positive for dysuria.   Musculoskeletal: Negative.    Skin: Positive for wound.   Neurological: Negative.    Psychiatric/Behavioral: Negative.         Objective   Objective   Vital Signs  Temp:  [96.4 °F (35.8 °C)-97.2 °F (36.2 °C)] 97.2 °F (36.2 °C)  Heart Rate:  [] 101  Resp:  [18] 18  BP: ()/(55-62) 100/62  SpO2:  [92 %-97 %] 92 %  on  Flow (L/min):  [2] 2;   Device (Oxygen Therapy): room air  Body mass index is 27.5 kg/m².  Physical Exam   Constitutional: She is oriented to person, place, and time. No distress.   Chronically ill appearance   HENT:   Head: Normocephalic.   Lt outer ear laceration; no drainage or swelling   Eyes: Conjunctivae are normal.   Neck: Neck supple.   Cardiovascular: Normal rate and regular rhythm.   Pulmonary/Chest: Effort normal and breath sounds normal. No respiratory distress.   Abdominal: She exhibits distension. There is tenderness.   Bowel sounds hypoactive   Musculoskeletal: She exhibits edema.   2+ BLE   Neurological: She is alert and oriented to person, place, and time.   Skin: Skin is warm and dry. No erythema.   BLE w/mild erythema; no weeping or open areas   Psychiatric:   Mild irritation when asked questions   Vitals reviewed.      Results Review:       I reviewed the patient's new clinical results.  Results from last 7 days   Lab Units 20  0725 20  0638 20  0712 " 03/29/20 0437   WBC 10*3/mm3 8.47 9.17 8.10 6.88   HEMOGLOBIN g/dL 10.4* 11.7* 11.7* 10.4*   PLATELETS 10*3/mm3 91* 100* 97* 85*     Results from last 7 days   Lab Units 04/01/20 0725 03/31/20 0638 03/30/20 0712 03/29/20 0437   SODIUM mmol/L 130* 130* 131* 134*   POTASSIUM mmol/L 3.9 4.1 4.0 3.7   CHLORIDE mmol/L 97* 97* 97* 103   CO2 mmol/L 21.8* 22.6 21.4* 18.6*   BUN mg/dL 46* 45* 47* 47*   CREATININE mg/dL 2.03* 1.89* 1.97* 2.03*   GLUCOSE mg/dL 122* 100* 106* 127*   Estimated Creatinine Clearance: 23.3 mL/min (A) (by C-G formula based on SCr of 2.03 mg/dL (H)).  Results from last 7 days   Lab Units 04/01/20 0725 03/31/20 0638 03/29/20 0437 03/28/20  0635 03/27/20  0431   ALBUMIN g/dL 2.50* 2.80* 2.80* 3.00* 2.70*   BILIRUBIN mg/dL  --   --  0.5 0.6 0.4   ALK PHOS U/L  --   --  79 71 66   AST (SGOT) U/L  --   --  10 9 8   ALT (SGPT) U/L  --   --  9 9 8     Results from last 7 days   Lab Units 04/01/20 0725 03/31/20 0638 03/30/20 0712 03/29/20 0437 03/28/20  0635   CALCIUM mg/dL 7.9* 8.3* 8.4* 7.8* 8.0*   ALBUMIN g/dL 2.50* 2.80*  --  2.80* 3.00*   MAGNESIUM mg/dL  --  2.1 2.0 2.0 2.0   PHOSPHORUS mg/dL 2.4* 2.4* 2.5 2.2* 2.6       No results found for: HGBA1C, POCGLU      bumetanide 2 mg Oral Daily   febuxostat 40 mg Oral Daily   fluticasone 2 spray Nasal Daily   guaiFENesin 600 mg Oral BID   hydrocortisone-pramoxine  Rectal BID   lactulose 20 g Oral BID   levothyroxine 50 mcg Oral Q AM   midodrine 10 mg Oral TID AC   neomycin-bacitracin-polymyxin  Topical Q12H   pantoprazole 40 mg Oral BID AC   sodium bicarbonate 650 mg Oral TID   Triamcinolone-Niacinamide 0.1%/2% Ointment 1 Units Topical BID      Diet Regular; GI Soft, Low Sodium; 2,000 mg Na       Assessment/Plan     Active Hospital Problems    Diagnosis  POA   • **Ventral hernia with obstruction and without gangrene [K43.6]  Yes   • Nausea and vomiting [R11.2]  Yes   • Hematemesis [K92.0]  Yes   • Abdominal pain [R10.9]  Yes   • CKD (chronic  kidney disease) stage 3, GFR 30-59 ml/min (CMS/HCC) [N18.3]  Yes   • Anemia, chronic disease [D63.8]  Yes   • NHL (nodular histiocytic lymphoma) (CMS/HCC) [C82.90]  Yes   • History of DVT (deep vein thrombosis) [Z86.718]  Not Applicable   • DNR (do not resuscitate) [Z66]  Yes   • History of esophageal varices [Z87.19]  Yes   • Hematemesis with nausea [K92.0]  Yes   • Mass of upper lobe of left lung [R91.8]  Yes   • Hypothyroidism (acquired) [E03.9]  Yes   • 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       70 y.o. female admitted with Ventral hernia with obstruction and without gangrene.    Ventral Hernia with incarcerated small bowel and obstruction  - s/p exploratory laparotomy with hernia repair 3/24/20-all small bowel was viable per operative report  - post op ileus improved, tolerating diet and return of bowel function  - continue pain control and encouraged frequent IS use; encouraged pt to use po pain meds   - tolerating regular diet/low sodium  -Will need staple removal in 1 week w/Dr. Cohen; no heavy lifting restriction     Hematemesis/ anemia  - s/p 2 units PRBC on 3/26  -no evidence of ongoing bleeding and Hgb stable.   - on oral protonix  - appreciate GI input, following conservatively      JAYDE on CKD Stage 3/ metabolic acidosis/ hyponatremia   - nephrology managing  - baseline Cr around 1.4-1.6  - likely pre-renal cause of JAYDE.  - Na low with ascites accumulation; s/p paracentesis 3/30  - had recent left hydronephrosis with ureteral stenting-no evidence of obstruction on CT scan; follow up with Urology outpatient  -Bumex restarted 3/31; Cr. 2.03 (1.89); continue sodium bicarb     CURIEL Cirrhosis  - complications include esophageal varices, ascites, portosystemic encephalopathy, and relatively mild  coagulopathy/thrombocytopenia  - continue on home regimen  - midodrine for BP support (has chronic hypotension); increased to 10 mg TID  - s/p Paracentesis with albumin replacement 3/30; 3000 ml removed. Fluid studies NGTD  - appreciate GI recs; will need to f/u with [facility] in 4 weeks     Right Pleural Effusion on CXR  3/23  - unsure if this is new, appears to be complex  - repeat CXR 3/30 showed increase in size  -Bumex restarted 3/31  -Sats > 90% on room air; monitor     NHL/Left Upper Lobe Mass  - SHANTI mass very suspicious for malignancy  - follows with oncology in [place]  - appreciate oncology recs and recommends biopsy as outpatient     Hypothyroidism  - continue synthroid    Lt ear laceration:  -Apply antibiotic ointment BID    Dysuria:  -Recheck UA        SCDs for DVT prophylaxis.  DNR.  Discussed with patient & Dr. Crawford.  Disposition: Rehab; 1-2 days pending medical stability        ANGIE Brunner  [facility]  04/01/20  10:43

## 2020-04-01 NOTE — PROGRESS NOTES
Chief Complaint:    POD 8, S/P repair of incarcerated ventral hernia    Subjective:    Doing okay today.  Tolerating diet.  Bowel movement x1 today.  She expects to leave the hospital tomorrow.    Objective:    Vitals:    04/01/20 0627 04/01/20 0700 04/01/20 0742 04/01/20 1603   BP:   100/62 94/54   BP Location:   Left arm Left arm   Patient Position:   Lying Lying   Pulse:  94 101    Resp:   18 18   Temp:   97.2 °F (36.2 °C) 97.9 °F (36.6 °C)   TempSrc:   Oral Oral   SpO2:   92% 92%   Weight: 68.2 kg (150 lb 5.7 oz)      Height:           Lungs: Clear  Heart: Regular  Abdomen: Incision looks okay.  No erythema or drainage. BS present.   Extremities: Warm    Labs reviewed.  WBC 8.47, Hgb 10.4, platelets 91.  Creatinine 2.03.    Assessment:    POD 8, S/P repair of incarcerated ventral hernia  CURIEL cirrhosis  CKD    Plan:    Seem stable to me.    Okay for discharge from my standpoint.    Will need follow-up in a week to have the skin staples removed.

## 2020-04-01 NOTE — PROGRESS NOTES
[facility]  Inpatient Progress Note    Reason for Follow Up: CURIEL Cirrhosis    Subjective     Interval History:   Complains of pain from a bedsore.  She denies abdominal pain.  She ate quite a bit of breakfast this morning.  No vomiting.  She is having yellow-brown stools.    Current Facility-Administered Medications:   •  acetaminophen (TYLENOL) tablet 650 mg, 650 mg, Oral, Q4H PRN **OR** acetaminophen (TYLENOL) 160 MG/5ML solution 650 mg, 650 mg, Oral, Q4H PRN **OR** acetaminophen (TYLENOL) suppository 650 mg, 650 mg, Rectal, Q4H PRN, Christopher uL MD  •  bisacodyl (DULCOLAX) EC tablet 5 mg, 5 mg, Oral, Daily PRN, Christopher Lu MD, 5 mg at 03/24/20 0314  •  bumetanide (BUMEX) tablet 2 mg, 2 mg, Oral, Daily, Ousmane Mccann MD, 2 mg at 04/01/20 0904  •  febuxostat (ULORIC) tablet 40 mg, 40 mg, Oral, Daily, Janes Dailey MD, 40 mg at 04/01/20 0904  •  fentaNYL citrate (PF) (SUBLIMAZE) injection 25 mcg, 25 mcg, Intravenous, Q1H PRN, Madyson Robles MD, 25 mcg at 03/25/20 0412  •  fluticasone (FLONASE) 50 MCG/ACT nasal spray 2 spray, 2 spray, Nasal, Daily, Christopher Lu MD, 2 spray at 04/01/20 0905  •  Glycerin-Hypromellose- (ARTIFICIAL TEARS) 0.2-0.2-1 % ophthalmic solution solution 2 drop, 2 drop, Both Eyes, Q1H PRN, Nadeem Martinez MD, 2 drop at 03/29/20 1142  •  guaiFENesin (MUCINEX) 12 hr tablet 600 mg, 600 mg, Oral, BID, Gloria Mars APRN, 600 mg at 04/01/20 0904  •  HYDROcodone-acetaminophen (NORCO) 5-325 MG per tablet 1 tablet, 1 tablet, Oral, Q4H PRN, Christopher Lu MD, 1 tablet at 03/27/20 0921  •  hydrocortisone-pramoxine (ANALPRAM-HC) 2.5-1 % rectal cream, , Rectal, BID, Christopher Lu MD  •  lactulose (CHRONULAC) 10 GM/15ML solution 20 g, 20 g, Oral, BID, Marlena Perez MD, 20 g at 04/01/20 0524  •  levothyroxine (SYNTHROID, LEVOTHROID) tablet 50 mcg, 50 mcg, Oral, Q AM, Christopher Lu MD, 50 mcg at 04/01/20 0524  •   LORazepam (ATIVAN) tablet 0.5 mg, 0.5 mg, Oral, Q8H PRN, Christopher Lu MD, 0.5 mg at 03/31/20 2210  •  melatonin tablet 5 mg, 5 mg, Oral, Nightly PRN, Christopher Lu MD  •  midodrine (PROAMATINE) tablet 10 mg, 10 mg, Oral, TID SUDHAKAR, Ousmane Mccann MD, 10 mg at 04/01/20 0804  •  morphine injection 1 mg, 1 mg, Intravenous, Q4H PRN, 1 mg at 03/28/20 1844 **AND** naloxone (NARCAN) injection 0.4 mg, 0.4 mg, Intravenous, Q5 Min PRN, Christopher Lu MD  •  nitroglycerin (NITROSTAT) SL tablet 0.4 mg, 0.4 mg, Sublingual, Q5 Min PRN, Christopher Lu MD  •  ondansetron (ZOFRAN) tablet 4 mg, 4 mg, Oral, Q6H PRN, 4 mg at 03/31/20 0424 **OR** ondansetron (ZOFRAN) injection 4 mg, 4 mg, Intravenous, Q6H PRN, Christopher Lu MD, 4 mg at 03/29/20 2202  •  ondansetron ODT (ZOFRAN-ODT) disintegrating tablet 4 mg, 4 mg, Oral, Q8H PRN, Christopher Lu MD  •  pantoprazole (PROTONIX) EC tablet 40 mg, 40 mg, Oral, BID AC, Nadeem Martinez MD, 40 mg at 04/01/20 0804  •  phenol (CHLORASEPTIC) 1.4 % liquid 2 spray, 2 spray, Mouth/Throat, Q2H PRN, Po Mitchell MD  •  prochlorperazine (COMPAZINE) injection 5 mg, 5 mg, Intravenous, Q6H PRN, Christopher Lu MD, 5 mg at 03/24/20 1133  •  sodium bicarbonate tablet 650 mg, 650 mg, Oral, TID, Janes Dailey MD, 650 mg at 04/01/20 0904  •  [COMPLETED] Insert peripheral IV, , , Once **AND** sodium chloride 0.9 % flush 10 mL, 10 mL, Intravenous, PRN, Christopher Lu MD, 10 mL at 03/31/20 9196  •  Triamcinolone-Niacinamide 0.1%/2% Ointment, 1 Units, Topical, BID, Christopher Lu MD, 1 Units at 04/01/20 0905  Review of Systems:    No fevers or chills, no nausea or vomiting    Objective     Vital Signs  Temp:  [96.4 °F (35.8 °C)-97.2 °F (36.2 °C)] 97.2 °F (36.2 °C)  Heart Rate:  [] 101  Resp:  [18] 18  BP: ()/(55-62) 100/62  Body mass index is 27.5 kg/m².    Intake/Output Summary (Last 24 hours) at 4/1/2020 1105  Last data filed at 4/1/2020  0846  Gross per 24 hour   Intake 210 ml   Output --   Net 210 ml     I/O this shift:  In: 210 [P.O.:210]  Out: -      Physical Exam:   General: patient awake, alert and cooperative   Eyes: Normal lids and lashes, no scleral icterus   Neck: supple, normal ROM   Skin: warm and dry, not jaundiced   Abdomen: soft, nontender, sllightly distended; normal bowel sounds   Extremities: no rash    Psychiatric: Normal mood and behavior; memory intact     Results Review:     I reviewed the patient's new clinical results.    Results from last 7 days   Lab Units 04/01/20  0725 03/31/20  0638 03/30/20  0712   WBC 10*3/mm3 8.47 9.17 8.10   HEMOGLOBIN g/dL 10.4* 11.7* 11.7*   HEMATOCRIT % 31.7* 35.0 35.1   PLATELETS 10*3/mm3 91* 100* 97*     Results from last 7 days   Lab Units 04/01/20  0725 03/31/20  0638 03/30/20  0712 03/29/20  0437 03/28/20  0635 03/27/20  0431   SODIUM mmol/L 130* 130* 131* 134* 137 138   POTASSIUM mmol/L 3.9 4.1 4.0 3.7 3.9 3.4*   CHLORIDE mmol/L 97* 97* 97* 103 103 103   CO2 mmol/L 21.8* 22.6 21.4* 18.6* 21.4* 22.8   BUN mg/dL 46* 45* 47* 47* 50* 55*   CREATININE mg/dL 2.03* 1.89* 1.97* 2.03* 2.02* 2.21*   CALCIUM mg/dL 7.9* 8.3* 8.4* 7.8* 8.0* 7.5*   BILIRUBIN mg/dL  --   --   --  0.5 0.6 0.4   ALK PHOS U/L  --   --   --  79 71 66   ALT (SGPT) U/L  --   --   --  9 9 8   AST (SGOT) U/L  --   --   --  10 9 8   GLUCOSE mg/dL 122* 100* 106* 127* 102* 90     Results from last 7 days   Lab Units 03/30/20  1255   INR  1.21*     Lab Results   Lab Value Date/Time    LIPASE 38 03/23/2020 1331    LIPASE 38 01/17/2020 1550    LIPASE 21 (L) 08/01/2017 1915       Radiology:  US Paracentesis   Final Result      XR Chest PA & Lateral   Final Result      XR Abdomen KUB   Final Result       As described.       This report was finalized on 3/27/2020 12:11 PM by Dr. Mo Morales M.D.          US Renal Bilateral   Final Result   1.  Unremarkable bilateral renal ultrasound except for minimal   prominence of the left renal  pelvis and a ureteral stent within the left   renal pelvis.   2.  Ascites including a loculated fluid collection in the left lower   quadrant.       This report was finalized on 3/27/2020 7:33 AM by Dr. Yifan Sung M.D.          CT Abdomen Pelvis Without Contrast   Final Result      XR Chest PA & Lateral   Final Result   Moderate right pleural effusion with small atelectasis or   infiltrate at the lung bases, follow-up recommended.       This report was finalized on 3/23/2020 7:13 PM by Dr. Mo Morales M.D.              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 (CMS/HCC)   • Obesity   • Acute kidney failure (CMS/HCC)   • Gastroesophageal reflux disease   • Hypothyroidism (acquired)   • Personal history of other malignant neoplasms of lymphoid, hematopoietic and related tissues   • Skin disorder   • Iron deficiency anemia due to chronic blood loss   • Microcytic anemia   • Gastroesophageal reflux disease without esophagitis   • Adverse effect of iron   • Solitary lung nodule   • Chronic hypotension   • Wound of shoulder   • Vitamin C deficiency   • Anemia of chronic disease   • Pain due to ureteral stent (CMS/HCC)   • Hyperkalemia   • Acute deep vein thrombosis (DVT) of both lower extremities (CMS/HCC)   • Mass of upper lobe of left lung   • Mood disorder due to a general medical condition   • Generalized anxiety disorder   • Acute on chronic diastolic heart failure (CMS/HCC)   • Hearing loss   • History of non-Hodgkin's lymphoma   • Hydroureteronephrosis   • Nausea and vomiting   • Hematemesis   • Hematemesis   • Abdominal pain   • CKD (chronic kidney disease) stage 3, GFR 30-59 ml/min (CMS/HCC)   • Anemia, chronic disease   • NHL (nodular histiocytic lymphoma) (CMS/HCC)   • History of DVT (deep vein thrombosis)   • DNR (do not resuscitate)   • History of esophageal varices   •  Hematemesis with nausea   • Ventral hernia with obstruction and without gangrene       Assessment:  1. CURIEL cirrhosis: decompensated with varices, ascites, encephalopathy     2. Normocytic anemia with reports of dark stool over the weekend: Hb stable, unlikely to be having any GI bleeding; she does have a history of banded varices earlier this year     3. Post operative status of incarcerated ventral hernia: followed by Dr Lamar     4. Symptomatic ascites: 3 L removed yesterday     5. JAYDE on CKD: improving      Plan:  · Diuresis per nephrology  · Continue low-sodium diet as tolerated  · Fluid from paracentesis Monday without evidence of infection  · When patient ready for discharge on other fronts, recommend that she follow-up with gastroenterology of [facility] as she is currently established with this group-she will need to be seen within 4 weeks.  · No further recommendations at this time-we will sign off but are available as needed    I discussed the patients findings and my recommendations with patient.    Marlena Perez MD

## 2020-04-01 NOTE — PROGRESS NOTES
"NEPHROLOGY PROGRESS NOTE------[facility]    [facility]  [phone]  Ousmane Mccann MD      Patient Care Team:  Heidi Manrique APRN as PCP - General (Nurse Practitioner)  Rosaura Keys PA as PCP - Claims Attributed  Laya Abad MD as Consulting Physician (Cardiology)  Ousmane Mccann MD as Consulting Physician (Nephrology)      Provider:  Ousmane Mccann MD  Patient Name: Kathie Cohen  :  1950    SUBJECTIVE:  F/u JAYDE/CKD  Comfortable. No chest pain or SOA      Medication:    bumetanide 2 mg Oral Daily   febuxostat 40 mg Oral Daily   fluticasone 2 spray Nasal Daily   guaiFENesin 600 mg Oral BID   hydrocortisone-pramoxine  Rectal BID   lactulose 20 g Oral BID   levothyroxine 50 mcg Oral Q AM   midodrine 10 mg Oral TID AC   pantoprazole 40 mg Oral BID AC   sodium bicarbonate 650 mg Oral TID   Triamcinolone-Niacinamide 0.1%/2% Ointment 1 Units Topical BID          OBJECTIVE    Vital Sign Min/Max for last 24 hours  Temp  Min: 96.6 °F (35.9 °C)  Max: 97.2 °F (36.2 °C)   BP  Min: 96/55  Max: 100/62   Pulse  Min: 87  Max: 101   Resp  Min: 18  Max: 18   SpO2  Min: 92 %  Max: 94 %   No data recorded   Weight  Min: 68.2 kg (150 lb 5.7 oz)  Max: 68.2 kg (150 lb 5.7 oz)     Flowsheet Rows      First Filed Value   Admission Height  157.5 cm (62\") Documented at 2020 1306   Admission Weight  64 kg (141 lb) Documented at 2020 1306          I/O this shift:  In: 210 [P.O.:210]  Out: -   I/O last 3 completed shifts:  In: 210 [P.O.:210]  Out: -     Physical Exam:  General Appearance: alert, appears stated age and cooperative  Head: normocephalic, without obvious abnormality and atraumatic  Eyes: conjunctivae and sclerae normal and no icterus  Neck: supple and no JVD  Lungs:Diminished breath sounds  Heart: regular rhythm & normal rate and normal S1, S2 +MARTY  Chest: Wall no abnormalities observed  Abdomen: +HYPOACTIVE BS S/P SURGICAL CHANGES WITH MILD " DISTENSION  Extremities: + edema  Skin: no bleeding, bruising or rash, turgor normal, color normal and no lesions noted  Neurologic: Alert, and oriented. No focal deficits    Labs:    WBC WBC   Date Value Ref Range Status   04/01/2020 8.47 3.40 - 10.80 10*3/mm3 Final   03/31/2020 9.17 3.40 - 10.80 10*3/mm3 Final   03/30/2020 8.10 3.40 - 10.80 10*3/mm3 Final      HGB Hemoglobin   Date Value Ref Range Status   04/01/2020 10.4 (L) 12.0 - 15.9 g/dL Final   03/31/2020 11.7 (L) 12.0 - 15.9 g/dL Final   03/30/2020 11.7 (L) 12.0 - 15.9 g/dL Final      HCT Hematocrit   Date Value Ref Range Status   04/01/2020 31.7 (L) 34.0 - 46.6 % Final   03/31/2020 35.0 34.0 - 46.6 % Final   03/30/2020 35.1 34.0 - 46.6 % Final      Platlets No results found for: LABPLAT   MCV MCV   Date Value Ref Range Status   04/01/2020 88.8 79.0 - 97.0 fL Final   03/31/2020 88.6 79.0 - 97.0 fL Final   03/30/2020 89.5 79.0 - 97.0 fL Final          Sodium Sodium   Date Value Ref Range Status   04/01/2020 130 (L) 136 - 145 mmol/L Final   03/31/2020 130 (L) 136 - 145 mmol/L Final   03/30/2020 131 (L) 136 - 145 mmol/L Final      Potassium Potassium   Date Value Ref Range Status   04/01/2020 3.9 3.5 - 5.2 mmol/L Final   03/31/2020 4.1 3.5 - 5.2 mmol/L Final   03/30/2020 4.0 3.5 - 5.2 mmol/L Final      Chloride Chloride   Date Value Ref Range Status   04/01/2020 97 (L) 98 - 107 mmol/L Final   03/31/2020 97 (L) 98 - 107 mmol/L Final   03/30/2020 97 (L) 98 - 107 mmol/L Final      CO2 CO2   Date Value Ref Range Status   04/01/2020 21.8 (L) 22.0 - 29.0 mmol/L Final   03/31/2020 22.6 22.0 - 29.0 mmol/L Final   03/30/2020 21.4 (L) 22.0 - 29.0 mmol/L Final      BUN BUN   Date Value Ref Range Status   04/01/2020 46 (H) 8 - 23 mg/dL Final   03/31/2020 45 (H) 8 - 23 mg/dL Final   03/30/2020 47 (H) 8 - 23 mg/dL Final      Creatinine Creatinine   Date Value Ref Range Status   04/01/2020 2.03 (H) 0.57 - 1.00 mg/dL Final   03/31/2020 1.89 (H) 0.57 - 1.00 mg/dL Final    03/30/2020 1.97 (H) 0.57 - 1.00 mg/dL Final      Calcium Calcium   Date Value Ref Range Status   04/01/2020 7.9 (L) 8.6 - 10.5 mg/dL Final   03/31/2020 8.3 (L) 8.6 - 10.5 mg/dL Final   03/30/2020 8.4 (L) 8.6 - 10.5 mg/dL Final      PO4 No components found for: PO4   Albumin Albumin   Date Value Ref Range Status   04/01/2020 2.50 (L) 3.50 - 5.20 g/dL Final   03/31/2020 2.80 (L) 3.50 - 5.20 g/dL Final      Magnesium Magnesium   Date Value Ref Range Status   03/31/2020 2.1 1.6 - 2.4 mg/dL Final   03/30/2020 2.0 1.6 - 2.4 mg/dL Final      Uric Acid No components found for: URIC ACID     Imaging Results (Last 72 Hours)     Procedure Component Value Units Date/Time    US Paracentesis [[id number]] Collected:  03/30/20 1725    Specimen:  Body Fluid Updated:  03/30/20 1748    Narrative:       ULTRASOUND-GUIDED PARACENTESIS     HISTORY: Ascites. Abdominal distention.     TECHNIQUE: Following sterile prep and local anesthetic, a special  paracentesis catheter was inserted into the upper right abdomen by Dr. Delgado. Ascites fluid 3000 mL was then removed with suction  technique. The patient tolerated the procedure well and there were no  immediate complications.     This report was finalized on 3/30/2020 5:45 PM by Dr. Kamlesh Delgado M.D.       XR Chest PA & Lateral [[id number]] Collected:  03/30/20 1443     Updated:  03/30/20 1710    Narrative:       TWO-VIEW CHEST     HISTORY: Previous history of lung cancer and non-Hodgkin's lymphoma.  Shortness of breath. Pleural effusion.     FINDINGS: There is a very large right pleural effusion showing further  increase in size since 7 days ago. There is associated dense atelectasis  and possible pneumonia at the right base also showing worsening. There  is a tiny left pleural effusion. The lungs are otherwise clear and the  heart size remains normal.     This report was finalized on 3/30/2020 5:07 PM by Dr. Kamlesh Delgado M.D.             Results for orders placed during the  hospital encounter of 03/23/20   XR Chest PA & Lateral    Narrative TWO-VIEW CHEST     HISTORY: Previous history of lung cancer and non-Hodgkin's lymphoma.  Shortness of breath. Pleural effusion.     FINDINGS: There is a very large right pleural effusion showing further  increase in size since 7 days ago. There is associated dense atelectasis  and possible pneumonia at the right base also showing worsening. There  is a tiny left pleural effusion. The lungs are otherwise clear and the  heart size remains normal.     This report was finalized on 3/30/2020 5:07 PM by Dr. Kamlesh Delgado M.D.      XR Abdomen KUB    Narrative XR ABDOMEN KUB-     INDICATIONS: Postoperative abdominal distention     TECHNIQUE: Supine views of the abdomen     COMPARISON:  image from CT from 03/24/2020     FINDINGS:     Skin staples overlie the left aspect of the abdomen and pelvis. Left  ureteral stent is in place. IVC filter is noted.      The bowel gas pattern is abnormal, with multiple abnormally dilated  loops of bowel, gas also seen in the colon. The postsurgical setting,  appearance suggests postoperative ileus, with early or partial small  bowel obstruction also possible. No supine evidence for free  intraperitoneal gas. Continued close follow-up is advised. If there is  further clinical concern, CT can be obtained for further evaluation.       Impression    As described.     This report was finalized on 3/27/2020 12:11 PM by Dr. Mo Morales M.D.      XR Chest PA & Lateral    Narrative XR CHEST PA AND LATERAL-     HISTORY: Female who is 70 years-old,  crackles     TECHNIQUE: Frontal and lateral views of the chest     COMPARISON: 01/24/2020     FINDINGS: Heart, mediastinum and pulmonary vasculature are unremarkable.  Moderate right pleural effusion is present, with small atelectasis or  infiltrate at the bases. Lateral view is technically limited by  overpenetration. No pneumothorax. No acute osseous process.        Impression Moderate right pleural effusion with small atelectasis or  infiltrate at the lung bases, follow-up recommended.     This report was finalized on 3/23/2020 7:13 PM by Dr. Mo Morales M.D.          Results for orders placed during the hospital encounter of 01/31/20   Duplex Venous Lower Extremity - Bilateral CAR    Narrative · Left popliteal fossa fluid collection.  · Acute right lower extremity deep vein thrombosis noted in the mid   femoral, popliteal, posterior tibial and peroneal.  · Acute left lower extremity deep vein thrombosis noted in the popliteal   and posterial tibial.  · All other veins appeared normal bilaterally.            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 AI/CKDd3------JAYDE on top of known CKD STG 3. JAYDE is secondary to ATN from hypotension and over diuresis and anemia with decreased renal perfusion.  Avoid hypotension. No NSAIDs or IV dye. Dose meds for CrCl less than 10 cc/min.      2. ACIDOSIS-----Metabolic. No elevation in AGAP. +Type 4 RTA. Increase po NaHCO3     3. CIRRHOSIS/ASCITES/CURIEL---per GI. Ammonia okay     4. ANEMIA OF CKD-------H/H stable post PRBCs.      5. CHRONIC LEFT HYDRONEPHROSIS-----per Urology as an outpatient     6. OA/DJD----No NSAIDs. Add uloric for hyperuricemia     7. HYPOALBUMINEMIA------S/P IV Albumin to temporize     8. HYPOTENSION-----Midodrine     9. HYPOCALCEMIA------Replace IV. Follow ionized  levels     10. HYPONATREMIA----Resolved off of diuretics and with NS     11. H/O BILAT ACUTE DVT LE S/P IVC FILTER     12. HYPOTHYROIDISM      13. H/O NHL     14. GERD WITH H/O ESOPHAGEAL VARICIES-----per GI    15. HYPOKALEMIA------Replaced    Cr stable  Sodium low, likely related to excess volume related to liver disease  Continue bumex 2 mg PO daily  BP better, continue midodrine  Awaits placement  Monitor UOP, cr, lytes      Ousmane Mccann MD  [facility]  [phone]  04/01/20  15:30

## 2020-04-01 NOTE — PLAN OF CARE
Problem: Patient Care Overview  Goal: Plan of Care Review  Outcome: Ongoing (interventions implemented as appropriate)  Flowsheets (Taken 4/1/2020 170)  Progress: improving  Plan of Care Reviewed With: patient   No falls. Tolerating diet. Midline incision intact open to air with staples. Urine specimen sent. Encourage po intake and ambulation. Ointment to left ear c/o pain and redness noted. Cont to monitor.

## 2020-04-02 NOTE — PLAN OF CARE
Problem: Patient Care Overview  Goal: Plan of Care Review  Outcome: Ongoing (interventions implemented as appropriate)  Flowsheets  Taken 4/1/2020 2011 by Lizzette Narvaez, RN  Plan of Care Reviewed With: patient  Taken 4/2/2020 0540 by Mallory Granados, RN  Outcome Summary: VSS.  2L O2 PRN for comfort.  No CPR.  S/P exp lap for hernia repair.  Midline incision ASCENCION with staples.  Abd distended.  Hx of CURIEL cirrohosis.  Last paracentesis 03/30 for 3L.  Per patient paracentesis monthly.  Congested cough noted.  Open spot on buttocks noted.  Mepilex applied.  Waiting precet for SNF.  Cont to monitor.

## 2020-04-02 NOTE — THERAPY TREATMENT NOTE
Patient Name: Kathie Cohen  : 1950    MRN: [id number]                              Today's Date: 2020       Admit Date: 3/23/2020    Visit Dx:     ICD-10-CM ICD-9-CM   1. Nausea and vomiting, intractability of vomiting not specified, unspecified vomiting type R11.2 787.01   2. Acute kidney injury (JAYDE) with acute tubular necrosis (ATN) (CMS/HCC) N17.0 584.5   3. Heme positive stool R19.5 792.1   4. History of esophageal varices Z87.19 V12.79   5. Hematemesis with nausea K92.0 578.0     787.02   6. Incarcerated ventral hernia K43.6 552.20   7. 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)   • Obesity   • Acute kidney failure (CMS/HCC)   • Gastroesophageal reflux disease   • Hypothyroidism (acquired)   • Personal history of other malignant neoplasms of lymphoid, hematopoietic and related tissues   • Skin disorder   • Iron deficiency anemia due to chronic blood loss   • Microcytic anemia   • Gastroesophageal reflux disease without esophagitis   • Adverse effect of iron   • Solitary lung nodule   • Chronic hypotension   • Wound of shoulder   • Vitamin C deficiency   • Anemia of chronic disease   • Pain due to ureteral stent (CMS/HCC)   • Hyperkalemia   • Acute deep vein thrombosis (DVT) of both lower extremities (CMS/HCC)   • Mass of upper lobe of left lung   • Mood disorder due to a general medical condition   • Generalized anxiety disorder   • Acute on chronic diastolic heart failure (CMS/HCC)   • Hearing loss   • History of non-Hodgkin's lymphoma   • Hydroureteronephrosis   • Nausea and vomiting   • Hematemesis   • Hematemesis   • Abdominal pain   • CKD (chronic kidney disease) stage 3, GFR 30-59 ml/min (CMS/HCC)   • Anemia, chronic disease   • NHL (nodular histiocytic lymphoma) (CMS/HCC)   • History of DVT (deep vein thrombosis)   • DNR (do not resuscitate)   • History  of esophageal varices   • Hematemesis with nausea   • Ventral hernia with obstruction and without gangrene     Past Medical History:   Diagnosis Date   • Arthritis    • Cancer (CMS/HCC) 2011    non hodgens lymphoma   • CHF (congestive heart failure) (CMS/HCC)     UNCERTAIN   • Cirrhosis (CMS/HCC)    • Constipation    • COPD (chronic obstructive pulmonary disease) (CMS/HCC)     denies   • CRF (chronic renal failure), stage 3 (moderate) (CMS/HCC)    • GERD (gastroesophageal reflux disease)     RARELY   • History of colon polyps    • History of non-Hodgkin's lymphoma 2011    Treated in 2011   • Hypertension    • Hypothyroidism    • CURIEL (nonalcoholic steatohepatitis)    • Nodule of upper lobe of left lung 2/1/2020   • Non-healing skin lesion     RIGHT CHEST   • Obesity    • Pancytopenia (CMS/HCC)    • Peripheral edema    • Portal hypertension (CMS/HCC)    • Seasonal allergies    • Skin lesions     COMES AND GOES CURRENT ON BACK OF NECK   • Ureteral obstruction     SECONDARY TO CHEMO     Past Surgical History:   Procedure Laterality Date   • CATARACT EXTRACTION, BILATERAL     • CHOLECYSTECTOMY     • COLONOSCOPY     • CYSTOSCOPY W/ URETERAL STENT PLACEMENT      CHANGE STENT  EVERY 3 MONTHS   • EXPLORATORY LAPAROTOMY N/A 3/24/2020    Procedure: LAPAROTOMY EXPLORATORY REPAIR OF INCARCERATED VENTRAL HERNIA;  Surgeon: Christopher Lu MD;  Location: [facility] OR;  Service: General;  Laterality: N/A;   • LIVER BIOPSY     • MEDIPORT INSERTION, SINGLE     • MEDIPORT REMOVAL     • SKIN LESION EXCISION N/A 7/24/2019    Procedure: Excision right chest skin lesion 4.5cm excised diameter, Excision posterior neck skin lesion 1.5 cm excised diameter;  Surgeon: Frederick Ryan MD;  Location: [facility] OR;  Service: General   • TONSILLECTOMY     • TUBAL ABDOMINAL LIGATION     • URETERAL EXPLORATION      ATTEMPT TO REPAIR OBST SECONDARY TO CHEMO     General Information     Row Name 04/02/20 1451          PT Evaluation  Time/Intention    Document Type  therapy note (daily note)  -PH     Mode of Treatment  physical therapy  -PH     Row Name 04/02/20 1451          Safety Issues, Functional Mobility    Safety Issues Affecting Function (Mobility)  awareness of need for assistance;insight into deficits/self awareness;ability to follow commands  -PH     Impairments Affecting Function (Mobility)  balance;endurance/activity tolerance;strength  -PH       User Key  (r) = Recorded By, (t) = Taken By, (c) = Cosigned By    Initials Name Provider Type    PH Kay Dyson PTA Physical Therapy Assistant        Mobility     Row Name 04/02/20 1452          Bed Mobility Assessment/Treatment    Bed Mobility Assessment/Treatment  supine-sit  -PH     Supine-Sit [place] (Bed Mobility)  verbal cues;nonverbal cues (demo/gesture);minimum assist (75% patient effort)  -PH     Comment (Bed Mobility)  Pt yelling out and reporting pain whenever she was touched - not wanting assist.   -PH     Row Name 04/02/20 1452          Transfer Assessment/Treatment    Comment (Transfers)  STS x 2; Pt req transfer to BS; Remains on BSC for 15 min not wanting assist w/ clean up, but finally agreeing to assist. Pt refusing assist for standing from BSC w/ PTA educating pt on need for assist for safety.  -PH     Row Name 04/02/20 1452          Sit-Stand Transfer    Sit-Stand [place] (Transfers)  minimum assist (75% patient effort)  -PH     Assistive Device (Sit-Stand Transfers)  walker, front-wheeled  -PH     Row Name 04/02/20 1452          Gait/Stairs Assessment/Training    Gait/Stairs Assessment/Training  gait/ambulation independence  -PH     [place] Level (Gait)  contact guard;verbal cues  -PH     Assistive Device (Gait)  walker, front-wheeled  -PH     Distance in Feet (Gait)  few steps to BSC then 15' to chair - extremely reduced gait speed w/ freq stops for rest.  -PH     Pattern (Gait)  step-to  -PH     Deviations/Abnormal Patterns (Gait)  gait  speed decreased;brigida decreased;stride length decreased  -PH     Bilateral Gait Deviations  forward flexed posture  -PH     Comment (Gait/Stairs)  Cues for upright posture.   -PH       User Key  (r) = Recorded By, (t) = Taken By, (c) = Cosigned By    Initials Name Provider Type     Kay Dyson PTA Physical Therapy Assistant        Obj/Interventions     Row Name 04/02/20 1456          Static Sitting Balance    Level of [place] (Unsupported Sitting, Static Balance)  supervision  -PH     Sitting Position (Unsupported Sitting, Static Balance)  sitting in chair;other (see comments) BSC   -PH     Time Able to Maintain Position (Unsupported Sitting, Static Balance)  more than 5 minutes;2 to 3 minutes  -PH     Comment (Unsupported Sitting, Static Balance)  BSC - 15 min; bed 2-3; B use of hands for upright support  -PH     Row Name 04/02/20 1456          Static Standing Balance    Level of [place] (Supported Standing, Static Balance)  contact guard assist  -PH     Time Able to Maintain Position (Supported Standing, Static Balance)  1 to 2 minutes  -PH     Assistive Device Utilized (Supported Standing, Static Balance)  walker, rolling  -PH       User Key  (r) = Recorded By, (t) = Taken By, (c) = Cosigned By    Initials Name Provider Type     Kay Dyson PTA Physical Therapy Assistant        Goals/Plan    No documentation.       Clinical Impression     Row Name 04/02/20 1452          Pain Assessment    Additional Documentation  Pain Scale: FACES Pre/Post-Treatment (Group)  -PH     Row Name 04/02/20 7475          Pain Scale: FACES Pre/Post-Treatment    Pain: FACES Scale, Pretreatment  8-->hurts whole lot  -PH     Pain: FACES Scale, Post-Treatment  8-->hurts whole lot  -PH     Pre/Post Treatment Pain Comment  Pt yelling in pain whenever touched   -PH     Row Name 04/02/20 3955          Plan of Care Review    Plan of Care Reviewed With  patient  -PH     Outcome Summary  Pt tolerated PT w/  transfer b>BSC in a few steps then after 15 min on BSC, amb 15' to chair using fww and CGA. Pt ambs at an extremely reduced gait speed taking freq standing rests. Pt req min A for s>s and STS x 2. PT will see pt again in 2 days and asks [facility] staff to amb pt at reg intervals in the interim.   -PH     Row Name 04/02/20 1457          Vital Signs    O2 Delivery Pre Treatment  supplemental O2  -PH     O2 Delivery Intra Treatment  room air  -PH     Post SpO2 (%)  95  -PH     O2 Delivery Post Treatment  supplemental O2  -PH     Row Name 04/02/20 1457          Positioning and Restraints    Pre-Treatment Position  in bed  -PH     Post Treatment Position  chair  -PH     In Chair  reclined;call light within reach;encouraged to call for assist;exit alarm on  -PH       User Key  (r) = Recorded By, (t) = Taken By, (c) = Cosigned By    Initials Name Provider Type    Kay Hollins PTA Physical Therapy Assistant        Outcome Measures     Row Name 04/02/20 1501          How much help from another person do you currently need...    Turning from your back to your side while in flat bed without using bedrails?  3  -PH     Moving from lying on back to sitting on the side of a flat bed without bedrails?  3  -PH     Moving to and from a bed to a chair (including a wheelchair)?  3  -PH     Standing up from a chair using your arms (e.g., wheelchair, bedside chair)?  3  -PH     Climbing 3-5 steps with a railing?  2  -PH     To walk in hospital room?  3  -PH     AM-PAC 6 Clicks Score (PT)  17  -PH     Row Name 04/02/20 1501          Functional Assessment    Outcome Measure Options  AM-PAC 6 Clicks Basic Mobility (PT)  -PH       User Key  (r) = Recorded By, (t) = Taken By, (c) = Cosigned By    Initials Name Provider Type    Kay Hollins PTA Physical Therapy Assistant          PT Recommendation and Plan     Outcome Summary/Treatment Plan (PT)  Anticipated Discharge Disposition (PT): skilled nursing facility, inpatient  rehabilitation facility  Plan of Care Reviewed With: patient  Outcome Summary: Pt tolerated PT w/ transfer b>BSC in a few steps then after 15 min on BSC, amb 15' to chair using fww and CGA. Pt ambs at an extremely reduced gait speed taking freq standing rests. Pt req min A for s>s and STS x 2. PT will see pt again in 2 days and asks [facility] staff to amb pt at reg intervals in the interim.      Time Calculation:   PT Charges     Row Name 04/02/20 1503             Time Calculation    Start Time  1313  -PH      Stop Time  1356  -PH      Time Calculation (min)  43 min  -PH      PT Received On  04/02/20  -PH      PT - Next Appointment  04/03/20  -PH        User Key  (r) = Recorded By, (t) = Taken By, (c) = Cosigned By    Initials Name Provider Type    PH Kay Dyson PTA Physical Therapy Assistant        Therapy Charges for Today     Code Description Service Date Service Provider Modifiers Qty    96793310464 HC PT THER PROC EA 15 MIN 4/2/2020 Kay Dyson PTA GP 3          PT G-Codes  Outcome Measure Options: AM-PAC 6 Clicks Basic Mobility (PT)  AM-PAC 6 Clicks Score (PT): 17  AM-PAC 6 Clicks Score (OT): 16    Kay Dyson PTA  4/2/2020

## 2020-04-02 NOTE — H&P (VIEW-ONLY)
Name: Kathie Cohen ADMIT: 3/23/2020   : 1950  PCP: Heidi Manrique APRN    MRN: [id number] LOS: 10 days   AGE/SEX: 70 y.o. female  ROOM: [facility]     Subjective   Subjective   Wants to go to rehab but c/o dyspnea. +cough. On O2 2L NC. Denies continued dysuria.     Review of Systems   Constitutional: Positive for fatigue. Negative for fever.   HENT: Negative.    Respiratory: Positive for cough and shortness of breath.    Cardiovascular: Negative.    Gastrointestinal: Positive for abdominal distention. Negative for nausea.   Genitourinary: Negative.    Musculoskeletal: Negative.    Skin: Negative.    Neurological: Negative.    Psychiatric/Behavioral: Negative.         Objective   Objective   Vital Signs  Temp:  [96.1 °F (35.6 °C)-98.9 °F (37.2 °C)] 96.7 °F (35.9 °C)  Heart Rate:  [92-95] 92  Resp:  [18] 18  BP: ()/(52-61) 93/55  SpO2:  [92 %-96 %] 96 %  on  Flow (L/min):  [2] 2;   Device (Oxygen Therapy): nasal cannula  Body mass index is 26.25 kg/m².  Physical Exam   Constitutional: She is oriented to person, place, and time.   Chronically ill appearance   HENT:   Head: Normocephalic.   Eyes: Conjunctivae are normal.   Neck: No JVD present.   Cardiovascular: Normal rate and regular rhythm.   Pulmonary/Chest: Effort normal. No respiratory distress.   Coarse breath sounds markie   Abdominal: Soft. Bowel sounds are normal. She exhibits distension.   Musculoskeletal: She exhibits edema.   1-2+ BLE   Neurological: She is alert and oriented to person, place, and time.   Skin: Skin is warm and dry.   Psychiatric: She has a normal mood and affect.   Vitals reviewed.      Results Review:       I reviewed the patient's new clinical results.  Results from last 7 days   Lab Units 20  0638 20  0725 20  0638 20  0712   WBC 10*3/mm3 8.56 8.47 9.17 8.10   HEMOGLOBIN g/dL 10.4* 10.4* 11.7* 11.7*   PLATELETS 10*3/mm3 74* 91* 100* 97*     Results from last 7 days   Lab Units 20  0609  04/01/20  0725 03/31/20  0638 03/30/20  0712   SODIUM mmol/L 131* 130* 130* 131*   POTASSIUM mmol/L 4.0 3.9 4.1 4.0   CHLORIDE mmol/L 97* 97* 97* 97*   CO2 mmol/L 21.9* 21.8* 22.6 21.4*   BUN mg/dL 49* 46* 45* 47*   CREATININE mg/dL 1.99* 2.03* 1.89* 1.97*   GLUCOSE mg/dL 107* 122* 100* 106*   Estimated Creatinine Clearance: 23.3 mL/min (A) (by C-G formula based on SCr of 1.99 mg/dL (H)).  Results from last 7 days   Lab Units 04/02/20 0638 04/01/20 0725 03/31/20  0638 03/29/20  0437 03/28/20  0635 03/27/20  0431   ALBUMIN g/dL 2.60* 2.50* 2.80* 2.80* 3.00* 2.70*   BILIRUBIN mg/dL  --   --   --  0.5 0.6 0.4   ALK PHOS U/L  --   --   --  79 71 66   AST (SGOT) U/L  --   --   --  10 9 8   ALT (SGPT) U/L  --   --   --  9 9 8     Results from last 7 days   Lab Units 04/02/20 0638 04/01/20 0725 03/31/20  0638 03/30/20  0712 03/29/20  0437 03/28/20  0635   CALCIUM mg/dL 7.9* 7.9* 8.3* 8.4* 7.8* 8.0*   ALBUMIN g/dL 2.60* 2.50* 2.80*  --  2.80* 3.00*   MAGNESIUM mg/dL  --   --  2.1 2.0 2.0 2.0   PHOSPHORUS mg/dL 2.8 2.4* 2.4* 2.5 2.2* 2.6       No results found for: HGBA1C, POCGLU      bumetanide 2 mg Oral Daily   febuxostat 40 mg Oral Daily   fluticasone 2 spray Nasal Daily   guaiFENesin 600 mg Oral BID   hydrocortisone-pramoxine  Rectal BID   lactulose 20 g Oral BID   levothyroxine 50 mcg Oral Q AM   midodrine 10 mg Oral TID AC   pantoprazole 40 mg Oral BID AC   sodium bicarbonate 650 mg Oral TID   Triamcinolone-Niacinamide 0.1%/2% Ointment 1 Units Topical BID      Diet Regular; GI Soft, Low Sodium; 2,000 mg Na       Assessment/Plan     Active Hospital Problems    Diagnosis  POA   • **Ventral hernia with obstruction and without gangrene [K43.6]  Yes   • Nausea and vomiting [R11.2]  Yes   • Hematemesis [K92.0]  Yes   • Abdominal pain [R10.9]  Yes   • CKD (chronic kidney disease) stage 3, GFR 30-59 ml/min (CMS/HCC) [N18.3]  Yes   • Anemia, chronic disease [D63.8]  Yes   • NHL (nodular histiocytic lymphoma) (CMS/HCC)  [C82.90]  Yes   • History of DVT (deep vein thrombosis) [Z86.718]  Not Applicable   • DNR (do not resuscitate) [Z66]  Yes   • History of esophageal varices [Z87.19]  Yes   • Hematemesis with nausea [K92.0]  Yes   • Mass of upper lobe of left lung [R91.8]  Yes   • Hypothyroidism (acquired) [E03.9]  Yes   • 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       70 y.o. female admitted with Ventral hernia with obstruction and without gangrene.    Ventral Hernia with incarcerated small bowel and obstruction  - s/p exploratory laparotomy with hernia repair 3/24/20-all small bowel was viable per operative report  - post op ileus improved, tolerating diet and return of bowel function  - continue pain control and encouraged frequent IS use; encouraged pt to use po pain meds   - tolerating regular diet/low sodium  -Will need staple removal in 1 week w/Dr. Cohen; no heavy lifting restriction     Hematemesis/ anemia  - s/p 2 units PRBC on 3/26  -no evidence of ongoing bleeding and Hgb stable.   - on oral protonix  - appreciate GI input, following conservatively      JAYDE on CKD Stage 3/ metabolic acidosis/ hyponatremia   - nephrology managing  - baseline Cr around 1.4-1.6  - likely pre-renal cause of JAYDE.  - Na low with ascites accumulation; s/p paracentesis 3/30  - had recent left hydronephrosis with ureteral stenting-no evidence of obstruction on CT scan; follow up with Urology outpatient  -Bumex restarted 3/31; Cr stable; continue sodium bicarb     CURIEL Cirrhosis  - complications include esophageal varices, ascites, portosystemic encephalopathy, and relatively mild coagulopathy/thrombocytopenia  - continue on home regimen  - midodrine for BP support (has chronic hypotension); increased to 10 mg TID  - s/p Paracentesis with albumin replacement 3/30;  3000 ml removed. Fluid studies NGTD  - appreciate GI recs; will need to f/u with [facility] in 4 weeks     Right Pleural Effusion on CXR  3/23  - unsure if this is new, appears to be complex  - repeat CXR 3/30 showed increase in size  -Bumex restarted 3/31  -Follow up CXR today to determine need for thoracentesis     NHL/Left Upper Lobe Mass  - SHANTI mass very suspicious for malignancy  - follows with oncology in [place]  - appreciate oncology recs and recommends biopsy as outpatient     Hypothyroidism  - continue synthroid     Lt ear laceration:  -Apply antibiotic ointment BID     Dysuria:  -Recheck UA  -Urine culture <25K mixed glendy; no need for antibiotic as symptoms have resolved, afebrile        SCDs for DVT prophylaxis.  DNR.  Discussed with patient & Dr. Crawford.  Disposition: Rehab; 1-2 days pending medical stability        ANGIE Brunner  [facility]  04/02/20  09:33

## 2020-04-02 NOTE — PLAN OF CARE
Problem: Patient Care Overview  Goal: Plan of Care Review  Outcome: Ongoing (interventions implemented as appropriate)  Flowsheets (Taken 4/2/2020 0302)  Plan of Care Reviewed With: patient  Outcome Summary: O2 2 L N/C, right sided thoacentesis done today 1500cc removed closed with dermabond no drainaged noted, adb with staples,2 BM, up to the chair, will continue to monitor

## 2020-04-02 NOTE — PROGRESS NOTES
"NEPHROLOGY PROGRESS NOTE------[facility]    [facility]  [phone]  Ousmane Mccann MD      Patient Care Team:  Heidi Manrique APRN as PCP - General (Nurse Practitioner)  Rosaura Keys PA as PCP - Claims Attributed  Laya Abad MD as Consulting Physician (Cardiology)  Ousmane Mccann MD as Consulting Physician (Nephrology)      Provider:  Ousmane Mccann MD  Patient Name: Kathie oChen  :  1950    SUBJECTIVE:  F/u JAYDE/CKD  Comfortable. No chest pain or SOA    Medication:    bumetanide 2 mg Oral Daily   febuxostat 40 mg Oral Daily   fluticasone 2 spray Nasal Daily   guaiFENesin 600 mg Oral BID   hydrocortisone-pramoxine  Rectal BID   lactulose 20 g Oral BID   levothyroxine 50 mcg Oral Q AM   midodrine 10 mg Oral TID AC   pantoprazole 40 mg Oral BID AC   sodium bicarbonate 650 mg Oral TID   Triamcinolone-Niacinamide 0.1%/2% Ointment 1 Units Topical BID          OBJECTIVE    Vital Sign Min/Max for last 24 hours  Temp  Min: 96.1 °F (35.6 °C)  Max: 98.9 °F (37.2 °C)   BP  Min: 93/55  Max: 105/61   Pulse  Min: 89  Max: 95   Resp  Min: 18  Max: 18   SpO2  Min: 92 %  Max: 96 %   No data recorded   Weight  Min: 65.1 kg (143 lb 8.3 oz)  Max: 68 kg (149 lb 14.6 oz)     Flowsheet Rows      First Filed Value   Admission Height  157.5 cm (62\") Documented at 2020 1306   Admission Weight  64 kg (141 lb) Documented at 2020 1306          I/O this shift:  In: 120 [P.O.:120]  Out: -   I/O last 3 completed shifts:  In: 450 [P.O.:450]  Out: -     Physical Exam:  General Appearance: alert, appears stated age and cooperative  Head: normocephalic, without obvious abnormality and atraumatic  Eyes: conjunctivae and sclerae normal and no icterus  Neck: supple and no JVD  Lungs:Diminished breath sounds  Heart: regular rhythm & normal rate and normal S1, S2 +MARTY  Chest: Wall no abnormalities observed  Abdomen: +HYPOACTIVE BS S/P SURGICAL CHANGES WITH MILD " DISTENSION  Extremities: + edema  Skin: no bleeding, bruising or rash, turgor normal, color normal and no lesions noted  Neurologic: Alert, and oriented. No focal deficits    Labs:    WBC WBC   Date Value Ref Range Status   04/02/2020 8.56 3.40 - 10.80 10*3/mm3 Final   04/01/2020 8.47 3.40 - 10.80 10*3/mm3 Final   03/31/2020 9.17 3.40 - 10.80 10*3/mm3 Final      HGB Hemoglobin   Date Value Ref Range Status   04/02/2020 10.4 (L) 12.0 - 15.9 g/dL Final   04/01/2020 10.4 (L) 12.0 - 15.9 g/dL Final   03/31/2020 11.7 (L) 12.0 - 15.9 g/dL Final      HCT Hematocrit   Date Value Ref Range Status   04/02/2020 30.8 (L) 34.0 - 46.6 % Final   04/01/2020 31.7 (L) 34.0 - 46.6 % Final   03/31/2020 35.0 34.0 - 46.6 % Final      Platlets No results found for: LABPLAT   MCV MCV   Date Value Ref Range Status   04/02/2020 87.5 79.0 - 97.0 fL Final   04/01/2020 88.8 79.0 - 97.0 fL Final   03/31/2020 88.6 79.0 - 97.0 fL Final          Sodium Sodium   Date Value Ref Range Status   04/02/2020 131 (L) 136 - 145 mmol/L Final   04/01/2020 130 (L) 136 - 145 mmol/L Final   03/31/2020 130 (L) 136 - 145 mmol/L Final      Potassium Potassium   Date Value Ref Range Status   04/02/2020 4.0 3.5 - 5.2 mmol/L Final   04/01/2020 3.9 3.5 - 5.2 mmol/L Final   03/31/2020 4.1 3.5 - 5.2 mmol/L Final      Chloride Chloride   Date Value Ref Range Status   04/02/2020 97 (L) 98 - 107 mmol/L Final   04/01/2020 97 (L) 98 - 107 mmol/L Final   03/31/2020 97 (L) 98 - 107 mmol/L Final      CO2 CO2   Date Value Ref Range Status   04/02/2020 21.9 (L) 22.0 - 29.0 mmol/L Final   04/01/2020 21.8 (L) 22.0 - 29.0 mmol/L Final   03/31/2020 22.6 22.0 - 29.0 mmol/L Final      BUN BUN   Date Value Ref Range Status   04/02/2020 49 (H) 8 - 23 mg/dL Final   04/01/2020 46 (H) 8 - 23 mg/dL Final   03/31/2020 45 (H) 8 - 23 mg/dL Final      Creatinine Creatinine   Date Value Ref Range Status   04/02/2020 1.99 (H) 0.57 - 1.00 mg/dL Final   04/01/2020 2.03 (H) 0.57 - 1.00 mg/dL Final    03/31/2020 1.89 (H) 0.57 - 1.00 mg/dL Final      Calcium Calcium   Date Value Ref Range Status   04/02/2020 7.9 (L) 8.6 - 10.5 mg/dL Final   04/01/2020 7.9 (L) 8.6 - 10.5 mg/dL Final   03/31/2020 8.3 (L) 8.6 - 10.5 mg/dL Final      PO4 No components found for: PO4   Albumin Albumin   Date Value Ref Range Status   04/02/2020 2.60 (L) 3.50 - 5.20 g/dL Final   04/01/2020 2.50 (L) 3.50 - 5.20 g/dL Final   03/31/2020 2.80 (L) 3.50 - 5.20 g/dL Final      Magnesium Magnesium   Date Value Ref Range Status   03/31/2020 2.1 1.6 - 2.4 mg/dL Final      Uric Acid No components found for: URIC ACID     Imaging Results (Last 72 Hours)     Procedure Component Value Units Date/Time    XR Chest PA & Lateral [[id number]] Collected:  04/02/20 1133     Updated:  04/02/20 1133    Narrative:       TWO-VIEW CHEST     HISTORY: Follow-up of pleural effusion. Previous lung cancer and  non-Hodgkin's lymphoma.     FINDINGS: There is a large right pleural effusion with some adjacent  atelectasis showing no change from 3 days ago. There is now a moderately  large left pleural effusion showing further enlargement. There is some  adjacent atelectasis at the left base. The lungs are otherwise clear and  the heart remains mildly enlarged.       US Paracentesis [[id number]] Collected:  03/30/20 1725    Specimen:  Body Fluid Updated:  03/30/20 1748    Narrative:       ULTRASOUND-GUIDED PARACENTESIS     HISTORY: Ascites. Abdominal distention.     TECHNIQUE: Following sterile prep and local anesthetic, a special  paracentesis catheter was inserted into the upper right abdomen by Dr. Delgado. Ascites fluid 3000 mL was then removed with suction  technique. The patient tolerated the procedure well and there were no  immediate complications.     This report was finalized on 3/30/2020 5:45 PM by Dr. Kamlesh Delgado M.D.       XR Chest PA & Lateral [[id number]] Collected:  03/30/20 1443     Updated:  03/30/20 1710    Narrative:       TWO-VIEW CHEST      HISTORY: Previous history of lung cancer and non-Hodgkin's lymphoma.  Shortness of breath. Pleural effusion.     FINDINGS: There is a very large right pleural effusion showing further  increase in size since 7 days ago. There is associated dense atelectasis  and possible pneumonia at the right base also showing worsening. There  is a tiny left pleural effusion. The lungs are otherwise clear and the  heart size remains normal.     This report was finalized on 3/30/2020 5:07 PM by Dr. Kamlesh Delgado M.D.             Results for orders placed during the hospital encounter of 03/23/20   XR Chest PA & Lateral    Narrative TWO-VIEW CHEST     HISTORY: Follow-up of pleural effusion. Previous lung cancer and  non-Hodgkin's lymphoma.     FINDINGS: There is a large right pleural effusion with some adjacent  atelectasis showing no change from 3 days ago. There is now a moderately  large left pleural effusion showing further enlargement. There is some  adjacent atelectasis at the left base. The lungs are otherwise clear and  the heart remains mildly enlarged.      XR Chest PA & Lateral    Narrative TWO-VIEW CHEST     HISTORY: Previous history of lung cancer and non-Hodgkin's lymphoma.  Shortness of breath. Pleural effusion.     FINDINGS: There is a very large right pleural effusion showing further  increase in size since 7 days ago. There is associated dense atelectasis  and possible pneumonia at the right base also showing worsening. There  is a tiny left pleural effusion. The lungs are otherwise clear and the  heart size remains normal.     This report was finalized on 3/30/2020 5:07 PM by Dr. Kamlesh Delgado M.D.      XR Abdomen KUB    Narrative XR ABDOMEN KUB-     INDICATIONS: Postoperative abdominal distention     TECHNIQUE: Supine views of the abdomen     COMPARISON:  image from CT from 03/24/2020     FINDINGS:     Skin staples overlie the left aspect of the abdomen and pelvis. Left  ureteral stent is in place. IVC  filter is noted.      The bowel gas pattern is abnormal, with multiple abnormally dilated  loops of bowel, gas also seen in the colon. The postsurgical setting,  appearance suggests postoperative ileus, with early or partial small  bowel obstruction also possible. No supine evidence for free  intraperitoneal gas. Continued close follow-up is advised. If there is  further clinical concern, CT can be obtained for further evaluation.       Impression    As described.     This report was finalized on 3/27/2020 12:11 PM by Dr. Mo Morales M.D.          Results for orders placed during the hospital encounter of 01/31/20   Duplex Venous Lower Extremity - Bilateral CAR    Narrative · Left popliteal fossa fluid collection.  · Acute right lower extremity deep vein thrombosis noted in the mid   femoral, popliteal, posterior tibial and peroneal.  · Acute left lower extremity deep vein thrombosis noted in the popliteal   and posterial tibial.  · All other veins appeared normal bilaterally.            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 AI/CKDd3------JAYDE on top of known CKD STG 3. JAYDE is secondary to ATN from hypotension and over diuresis and anemia with decreased renal perfusion.  Avoid hypotension. No NSAIDs or IV dye. Dose meds for CrCl less than 10 cc/min.      2. ACIDOSIS-----Metabolic. No elevation in AGAP. +Type 4 RTA. Increase po NaHCO3     3. CIRRHOSIS/ASCITES/CURIEL---per  GI. Ammonia okay     4. ANEMIA OF CKD-------H/H stable post PRBCs.      5. CHRONIC LEFT HYDRONEPHROSIS-----per Urology as an outpatient     6. OA/DJD----No NSAIDs. Add uloric for hyperuricemia     7. HYPOALBUMINEMIA------S/P IV Albumin to temporize     8. HYPOTENSION-----Midodrine     9. HYPOCALCEMIA------Replace IV. Follow ionized levels     10. HYPONATREMIA----Resolved off of diuretics and with NS     11. H/O BILAT ACUTE DVT LE S/P IVC FILTER     12. HYPOTHYROIDISM      13. H/O NHL     14. GERD WITH H/O ESOPHAGEAL VARICIES-----per GI    15. HYPOKALEMIA------Replaced    Cr stable  Sodium low, likely related to excess volume related to liver disease  Continue bumex 2 mg PO daily  BP better, continue midodrine  Awaits placement at [facility]  Monitor UOP, cr, lytes      Ousmane Mccann MD  [facility]  [phone]  04/02/20  12:53

## 2020-04-02 NOTE — PROGRESS NOTES
Continued Stay Note  [facility]     Patient Name: Kathie Cohen  MRN: [id number]  Today's Date: 4/2/2020    Admit Date: 3/23/2020    Discharge Plan     Row Name 04/02/20 1316       Plan    Plan  [facility]    Patient/Family in Agreement with Plan  yes    Plan Comments  Updated [facility] regarding respiratory status. CXR showing large right and left pleural effusions. Possible thoracentesis. [facility] must watch patient for a few days due to change in respiratory status and the current pandemic of covid-19. CCP to follow. Debra Argueta RN        Discharge Codes    No documentation.             Debra Argueta RN

## 2020-04-02 NOTE — INTERVAL H&P NOTE
H&P reviewed. The patient was examined and there are no changes to the H&P.   Risks discussed included but not limited to pain, bleeding, infection, damaging surrounding structures (including pneumothorax requiring a chest tube and hospital admission and which could result in cardiopulmonary arrest) and need for further procedures/failure of procedure.  denied blood thinners

## 2020-04-02 NOTE — PROGRESS NOTES
Name: Kathie Cohen ADMIT: 3/23/2020   : 1950  PCP: Heidi Manrique APRN    MRN: [id number] LOS: 10 days   AGE/SEX: 70 y.o. female  ROOM: [facility]     Subjective   Subjective   Wants to go to rehab but c/o dyspnea. +cough. On O2 2L NC. Denies continued dysuria.     Review of Systems   Constitutional: Positive for fatigue. Negative for fever.   HENT: Negative.    Respiratory: Positive for cough and shortness of breath.    Cardiovascular: Negative.    Gastrointestinal: Positive for abdominal distention. Negative for nausea.   Genitourinary: Negative.    Musculoskeletal: Negative.    Skin: Negative.    Neurological: Negative.    Psychiatric/Behavioral: Negative.         Objective   Objective   Vital Signs  Temp:  [96.1 °F (35.6 °C)-98.9 °F (37.2 °C)] 96.7 °F (35.9 °C)  Heart Rate:  [92-95] 92  Resp:  [18] 18  BP: ()/(52-61) 93/55  SpO2:  [92 %-96 %] 96 %  on  Flow (L/min):  [2] 2;   Device (Oxygen Therapy): nasal cannula  Body mass index is 26.25 kg/m².  Physical Exam   Constitutional: She is oriented to person, place, and time.   Chronically ill appearance   HENT:   Head: Normocephalic.   Eyes: Conjunctivae are normal.   Neck: No JVD present.   Cardiovascular: Normal rate and regular rhythm.   Pulmonary/Chest: Effort normal. No respiratory distress.   Coarse breath sounds markie   Abdominal: Soft. Bowel sounds are normal. She exhibits distension.   Musculoskeletal: She exhibits edema.   1-2+ BLE   Neurological: She is alert and oriented to person, place, and time.   Skin: Skin is warm and dry.   Psychiatric: She has a normal mood and affect.   Vitals reviewed.      Results Review:       I reviewed the patient's new clinical results.  Results from last 7 days   Lab Units 20  0638 20  0725 20  0638 20  0712   WBC 10*3/mm3 8.56 8.47 9.17 8.10   HEMOGLOBIN g/dL 10.4* 10.4* 11.7* 11.7*   PLATELETS 10*3/mm3 74* 91* 100* 97*     Results from last 7 days   Lab Units 20  0655  04/01/20  0725 03/31/20  0638 03/30/20  0712   SODIUM mmol/L 131* 130* 130* 131*   POTASSIUM mmol/L 4.0 3.9 4.1 4.0   CHLORIDE mmol/L 97* 97* 97* 97*   CO2 mmol/L 21.9* 21.8* 22.6 21.4*   BUN mg/dL 49* 46* 45* 47*   CREATININE mg/dL 1.99* 2.03* 1.89* 1.97*   GLUCOSE mg/dL 107* 122* 100* 106*   Estimated Creatinine Clearance: 23.3 mL/min (A) (by C-G formula based on SCr of 1.99 mg/dL (H)).  Results from last 7 days   Lab Units 04/02/20 0638 04/01/20 0725 03/31/20  0638 03/29/20  0437 03/28/20  0635 03/27/20  0431   ALBUMIN g/dL 2.60* 2.50* 2.80* 2.80* 3.00* 2.70*   BILIRUBIN mg/dL  --   --   --  0.5 0.6 0.4   ALK PHOS U/L  --   --   --  79 71 66   AST (SGOT) U/L  --   --   --  10 9 8   ALT (SGPT) U/L  --   --   --  9 9 8     Results from last 7 days   Lab Units 04/02/20 0638 04/01/20 0725 03/31/20  0638 03/30/20  0712 03/29/20  0437 03/28/20  0635   CALCIUM mg/dL 7.9* 7.9* 8.3* 8.4* 7.8* 8.0*   ALBUMIN g/dL 2.60* 2.50* 2.80*  --  2.80* 3.00*   MAGNESIUM mg/dL  --   --  2.1 2.0 2.0 2.0   PHOSPHORUS mg/dL 2.8 2.4* 2.4* 2.5 2.2* 2.6       No results found for: HGBA1C, POCGLU      bumetanide 2 mg Oral Daily   febuxostat 40 mg Oral Daily   fluticasone 2 spray Nasal Daily   guaiFENesin 600 mg Oral BID   hydrocortisone-pramoxine  Rectal BID   lactulose 20 g Oral BID   levothyroxine 50 mcg Oral Q AM   midodrine 10 mg Oral TID AC   pantoprazole 40 mg Oral BID AC   sodium bicarbonate 650 mg Oral TID   Triamcinolone-Niacinamide 0.1%/2% Ointment 1 Units Topical BID      Diet Regular; GI Soft, Low Sodium; 2,000 mg Na       Assessment/Plan     Active Hospital Problems    Diagnosis  POA   • **Ventral hernia with obstruction and without gangrene [K43.6]  Yes   • Nausea and vomiting [R11.2]  Yes   • Hematemesis [K92.0]  Yes   • Abdominal pain [R10.9]  Yes   • CKD (chronic kidney disease) stage 3, GFR 30-59 ml/min (CMS/HCC) [N18.3]  Yes   • Anemia, chronic disease [D63.8]  Yes   • NHL (nodular histiocytic lymphoma) (CMS/HCC)  [C82.90]  Yes   • History of DVT (deep vein thrombosis) [Z86.718]  Not Applicable   • DNR (do not resuscitate) [Z66]  Yes   • History of esophageal varices [Z87.19]  Yes   • Hematemesis with nausea [K92.0]  Yes   • Mass of upper lobe of left lung [R91.8]  Yes   • Hypothyroidism (acquired) [E03.9]  Yes   • 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       70 y.o. female admitted with Ventral hernia with obstruction and without gangrene.    Ventral Hernia with incarcerated small bowel and obstruction  - s/p exploratory laparotomy with hernia repair 3/24/20-all small bowel was viable per operative report  - post op ileus improved, tolerating diet and return of bowel function  - continue pain control and encouraged frequent IS use; encouraged pt to use po pain meds   - tolerating regular diet/low sodium  -Will need staple removal in 1 week w/Dr. Cohen; no heavy lifting restriction     Hematemesis/ anemia  - s/p 2 units PRBC on 3/26  -no evidence of ongoing bleeding and Hgb stable.   - on oral protonix  - appreciate GI input, following conservatively      JAYDE on CKD Stage 3/ metabolic acidosis/ hyponatremia   - nephrology managing  - baseline Cr around 1.4-1.6  - likely pre-renal cause of JAYDE.  - Na low with ascites accumulation; s/p paracentesis 3/30  - had recent left hydronephrosis with ureteral stenting-no evidence of obstruction on CT scan; follow up with Urology outpatient  -Bumex restarted 3/31; Cr stable; continue sodium bicarb     CURIEL Cirrhosis  - complications include esophageal varices, ascites, portosystemic encephalopathy, and relatively mild coagulopathy/thrombocytopenia  - continue on home regimen  - midodrine for BP support (has chronic hypotension); increased to 10 mg TID  - s/p Paracentesis with albumin replacement 3/30;  3000 ml removed. Fluid studies NGTD  - appreciate GI recs; will need to f/u with [facility] in 4 weeks     Right Pleural Effusion on CXR  3/23  - unsure if this is new, appears to be complex  - repeat CXR 3/30 showed increase in size  -Bumex restarted 3/31  -Follow up CXR today to determine need for thoracentesis     NHL/Left Upper Lobe Mass  - SHANTI mass very suspicious for malignancy  - follows with oncology in [place]  - appreciate oncology recs and recommends biopsy as outpatient     Hypothyroidism  - continue synthroid     Lt ear laceration:  -Apply antibiotic ointment BID     Dysuria:  -Recheck UA  -Urine culture <25K mixed glendy; no need for antibiotic as symptoms have resolved, afebrile        SCDs for DVT prophylaxis.  DNR.  Discussed with patient & Dr. Crawford.  Disposition: Rehab; 1-2 days pending medical stability        ANGIE Brunner  [facility]  04/02/20  09:33

## 2020-04-02 NOTE — PLAN OF CARE
Problem: Patient Care Overview  Goal: Plan of Care Review  Outcome: Ongoing (interventions implemented as appropriate)  Flowsheets (Taken 4/2/2020 6177)  Plan of Care Reviewed With: patient  Outcome Summary: Pt tolerated PT w/ transfer b>BSC in a few steps then after 15 min on BSC, amb 15' to chair using fww and CGA. Pt ambs at an extremely reduced gait speed taking freq standing rests. Pt req min A for s>s and STS x 2. PT will see pt again in 2 days and asks [facility] staff to amb pt at reg intervals in the interim.

## 2020-04-03 PROBLEM — J90 PLEURAL EFFUSION: Status: ACTIVE | Noted: 2020-01-01

## 2020-04-03 NOTE — PROGRESS NOTES
Continued Stay Note  [facility]     Patient Name: Kathie Cohen  MRN: [id number]  Today's Date: 4/3/2020    Admit Date: 3/23/2020    Discharge Plan     Row Name 04/03/20 1520       Plan    Plan  [facility]    Patient/Family in Agreement with Plan  yes    Plan Comments  Notified daughter of DC orders. Patient will DC via [facility]. Patient updated also. Debra Argueta RN        Discharge Codes    No documentation.       Expected Discharge Date and Time     Expected Discharge Date Expected Discharge Time    Apr 3, 2020             Debra Argueta RN

## 2020-04-03 NOTE — PROGRESS NOTES
Continued Stay Note  [facility]     Patient Name: Kathie Cohen  MRN: [id number]  Today's Date: 4/3/2020    Admit Date: 3/23/2020    Discharge Plan     Row Name 04/03/20 0851       Plan    Plan  [facility]    Patient/Family in Agreement with Plan  yes    Plan Comments  Spoke with [facility]. Infection control contributed patient's symptoms to her pleural effusion. They can still take patient. Will need to update pre-cert prior to discharge. Debra Argueta RN        Discharge Codes    No documentation.             Debra Argueta RN

## 2020-04-03 NOTE — NURSING NOTE
Called patients daughter Gretchen to see if she can bring in the synthroid we do not carry the brand. Gretchen stated she would try to get it.

## 2020-04-03 NOTE — PLAN OF CARE
Problem: Patient Care Overview  Goal: Plan of Care Review  Outcome: Ongoing (interventions implemented as appropriate)  Flowsheets (Taken 4/3/2020 5378)  Progress: no change  Plan of Care Reviewed With: patient  Outcome Summary: Pt A&Ox4, slept off and on this shift, midline with staples open to air C/D/I, reports feeling better but short of air, refuses I.S., NC with 2L but often removes, Q2 turns with pt assisting, Abdomin tender and appears more rounded than start of shift, vitals stable, daily wt, reduced sodium GI soft diet, incontinent with care given as needed

## 2020-04-03 NOTE — NURSING NOTE
CWOCN consult for perineum, buttocks, abdominal fold. Patient is frequently wet from urine and loose bm. Today, she had a large loose bm. Her skin is breaking down on her buttocks and below her coccyx. The skin is denuded in 2 areas in the gluteal/gluteal cleft. Patchy redness noted to perineum, pubis, abdominal fold. RN has used topical ointments- z guard, vaseline, as well as a silicone border dressing. Difficult to keep dressing in place related to the moisture.   Assessed skin and recommend to use the Magic barrier ointment related to the antifungal, antibiotic, steroid and barrier components. RN and patient agree. RN to follow up with MD about getting it for patient prior to discharge. Discharge summary noted.

## 2020-04-03 NOTE — PLAN OF CARE
Problem: Patient Care Overview  Goal: Plan of Care Review  Outcome: Ongoing (interventions implemented as appropriate)  Flowsheets (Taken 4/3/2020 1216)  Plan of Care Reviewed With: patient  Outcome Summary: pt is being discharged today

## 2020-04-03 NOTE — PROGRESS NOTES
"Physicians Statement of Medical Necessity for  Ambulance Transportation    GENERAL INFORMATION     Name: Kathie Cohen  YOB: 1950  Medicare #:   Transport Date: 4/3/2020 (Valid for round trips this date, or for scheduled repetitive trips for 60 days from the date signed below.)  Origin: [facility]  Destination: [facility]  Is the Patient's stay covered under Medicare Part A (PPS/DRG?)Yes   Closest appropriate facility? Yes  If this a hosp-hosp transfer? No  Is this a hospice patient? No    MEDICAL NECESSITY QUESTIONAIRE    Ambulance Transportation is medically necessary only if other means of transportation are contraindicated or would be potentially harmful to the patient.  To meet this requirement, the patient must be either \"bed confined\" or suffer from a condition such that transport by means other than an ambulance is contraindicated by the patient's condition.  The following questions must be answered by the healthcare professional signing below for this form to be valid:     1) Describe the MEDICAL CONDITION (physical and/or mental) of this patient AT THE TIME OF AMBULANCE TRANSPORT that requires the patient to be transported in an ambulance, and why transport by other means is contraindicated by the patient's condition:   Past Medical History:   Diagnosis Date   • Arthritis    • Cancer (CMS/HCC) 2011    non hodgens lymphoma   • CHF (congestive heart failure) (CMS/HCC)     UNCERTAIN   • Cirrhosis (CMS/HCC)    • Constipation    • COPD (chronic obstructive pulmonary disease) (CMS/HCC)     denies   • CRF (chronic renal failure), stage 3 (moderate) (CMS/HCC)    • GERD (gastroesophageal reflux disease)     RARELY   • History of colon polyps    • History of non-Hodgkin's lymphoma 2011    Treated in 2011   • Hypertension    • Hypothyroidism    • CURIEL (nonalcoholic steatohepatitis)    • Nodule of upper lobe of left lung 2/1/2020   • Non-healing skin lesion     RIGHT " "CHEST   • Obesity    • Pancytopenia (CMS/HCC)    • Peripheral edema    • Portal hypertension (CMS/HCC)    • Seasonal allergies    • Skin lesions     COMES AND GOES CURRENT ON BACK OF NECK   • Ureteral obstruction     SECONDARY TO CHEMO      Past Surgical History:   Procedure Laterality Date   • CATARACT EXTRACTION, BILATERAL     • CHOLECYSTECTOMY     • COLONOSCOPY     • CYSTOSCOPY W/ URETERAL STENT PLACEMENT      CHANGE STENT  EVERY 3 MONTHS   • EXPLORATORY LAPAROTOMY N/A 3/24/2020    Procedure: LAPAROTOMY EXPLORATORY REPAIR OF INCARCERATED VENTRAL HERNIA;  Surgeon: Christopher Lu MD;  Location: [facility] OR;  Service: General;  Laterality: N/A;   • LIVER BIOPSY     • MEDIPORT INSERTION, SINGLE     • MEDIPORT REMOVAL     • SKIN LESION EXCISION N/A 7/24/2019    Procedure: Excision right chest skin lesion 4.5cm excised diameter, Excision posterior neck skin lesion 1.5 cm excised diameter;  Surgeon: Frederick Ryan MD;  Location: [facility] OR;  Service: General   • TONSILLECTOMY     • TUBAL ABDOMINAL LIGATION     • URETERAL EXPLORATION      ATTEMPT TO REPAIR OBST SECONDARY TO CHEMO      2) Is this patient \"bed confined\" as defined below?Yes    To be \"bed confined\" the patient must satisfy all three of the following criteria:  (1) unable to get up from bed without assistance; AND (2) unable to ambulate;  AND (3) unable to sit in a chair or wheelchair.  3) Can this patient safely be transported by car or wheelchair van (I.e., may safely sit during transport, without an attendant or monitoring?)Yes   4. In addition to completing questions 1-3 above, please check any of the following conditions that apply*:          *Note: supporting documentation for any boxes checked must be maintained in the patient's medical records Medical attendant required, Requires oxygen - unable to self administer and Unable to tolerate seated position for time needed to transport      SIGNATURE OF PHYSICIAN OR OTHER AUTHORIZED " HEALTHCARE PROFESSIONAL    I certify that the above information is true and correct based on my evaluation of this patient, and represent that the patient requires transport by ambulance and that other forms of transport are contraindicated.  I understand that this information will be used by the Centers for Medicare and Medicaid Services (CMS) to support the determiniation of medical necessity for ambulance services, and I represent that I have personal knowledge of the patient's condition at the time of transport.     X   If this box is checked, I also certify that the patient is physically or mentally incapable of signing the ambulance service's claim form and that the institution with which I am affiliated has furnished care, services or assistance to the patient.  My signature below is made on behalf of the patient pursuant to 42 .36(b)(4). In accordance with 42 .37, the specific reason(s) that the patient is physically or mentally incapable of signing the claim for is as follows:     Signature of Physician or Healthcare Professional  Date/Time:        (For Scheduled repetitive transport, this form is not valid for transports performed more than 60 days after this date).                                                                                                                                            --------------------------------------------------------------------------------------------  Printed Name and Credentials of Physician or Authorized Healthcare Professional     *Form must be signed by patient's attending physician for scheduled, repetitive transports,.  For non-repetitive ambulance transports, if unable to obtain the signature of the attending physician, any of the following may sign (please select below):     Physician  Clinical Nurse Specialist  Registered Nurse     Physician Assistant  Discharge Planner  Licensed Practical Nurse     Nurse Practitioner

## 2020-04-03 NOTE — PROGRESS NOTES
"NEPHROLOGY PROGRESS NOTE------[facility]    [facility]  [phone]  Ousmane Mccann MD      Patient Care Team:  Heidi Manrique APRN as PCP - General (Nurse Practitioner)  Rosaura Keys PA as PCP - Claims Attributed  Laya Abad MD as Consulting Physician (Cardiology)  Ousmane Mccann MD as Consulting Physician (Nephrology)      Provider:  Ousmane Mccann MD  Patient Name: Kathie Cohen  :  1950    SUBJECTIVE:  F/u JAYDE/CKD  Comfortable. No chest pain or SOA    Medication:    bumetanide 2 mg Oral Daily   febuxostat 40 mg Oral Daily   fluticasone 2 spray Nasal Daily   guaiFENesin 600 mg Oral BID   hydrocortisone-pramoxine  Rectal BID   lactulose 20 g Oral BID   levothyroxine 50 mcg Oral Q AM   midodrine 10 mg Oral TID AC   pantoprazole 40 mg Oral BID AC   sodium bicarbonate 650 mg Oral TID   Triamcinolone-Niacinamide 0.1%/2% Ointment 1 Units Topical BID       hold 1 each       OBJECTIVE    Vital Sign Min/Max for last 24 hours  Temp  Min: 96.7 °F (35.9 °C)  Max: 97.8 °F (36.6 °C)   BP  Min: 87/47  Max: 107/56   Pulse  Min: 81  Max: 96   Resp  Min: 16  Max: 18   SpO2  Min: 93 %  Max: 100 %   No data recorded   Weight  Min: 61.9 kg (136 lb 7.4 oz)  Max: 61.9 kg (136 lb 7.4 oz)     Flowsheet Rows      First Filed Value   Admission Height  157.5 cm (62\") Documented at 2020 1306   Admission Weight  64 kg (141 lb) Documented at 2020 1306          I/O this shift:  In: 330 [P.O.:330]  Out: -   I/O last 3 completed shifts:  In: 120 [P.O.:120]  Out: 1500 [Other:1500]    Physical Exam:  General Appearance: alert, appears stated age and cooperative  Head: normocephalic, without obvious abnormality and atraumatic  Eyes: conjunctivae and sclerae normal and no icterus  Neck: supple and no JVD  Lungs:Diminished breath sounds  Heart: regular rhythm & normal rate and normal S1, S2 +MARTY  Chest: Wall no abnormalities observed  Abdomen: +HYPOACTIVE BS S/P SURGICAL CHANGES " WITH MILD DISTENSION  Extremities: + edema  Skin: no bleeding, bruising or rash, turgor normal, color normal and no lesions noted  Neurologic: Alert, and oriented. No focal deficits    Labs:    WBC WBC   Date Value Ref Range Status   04/03/2020 6.48 3.40 - 10.80 10*3/mm3 Final   04/02/2020 8.56 3.40 - 10.80 10*3/mm3 Final   04/01/2020 8.47 3.40 - 10.80 10*3/mm3 Final      HGB Hemoglobin   Date Value Ref Range Status   04/03/2020 9.6 (L) 12.0 - 15.9 g/dL Final   04/02/2020 10.4 (L) 12.0 - 15.9 g/dL Final   04/01/2020 10.4 (L) 12.0 - 15.9 g/dL Final      HCT Hematocrit   Date Value Ref Range Status   04/03/2020 29.2 (L) 34.0 - 46.6 % Final   04/02/2020 30.8 (L) 34.0 - 46.6 % Final   04/01/2020 31.7 (L) 34.0 - 46.6 % Final      Platlets No results found for: LABPLAT   MCV MCV   Date Value Ref Range Status   04/03/2020 88.0 79.0 - 97.0 fL Final   04/02/2020 87.5 79.0 - 97.0 fL Final   04/01/2020 88.8 79.0 - 97.0 fL Final          Sodium Sodium   Date Value Ref Range Status   04/03/2020 130 (L) 136 - 145 mmol/L Final   04/02/2020 131 (L) 136 - 145 mmol/L Final   04/01/2020 130 (L) 136 - 145 mmol/L Final      Potassium Potassium   Date Value Ref Range Status   04/03/2020 3.7 3.5 - 5.2 mmol/L Final   04/02/2020 4.0 3.5 - 5.2 mmol/L Final   04/01/2020 3.9 3.5 - 5.2 mmol/L Final      Chloride Chloride   Date Value Ref Range Status   04/03/2020 94 (L) 98 - 107 mmol/L Final   04/02/2020 97 (L) 98 - 107 mmol/L Final   04/01/2020 97 (L) 98 - 107 mmol/L Final      CO2 CO2   Date Value Ref Range Status   04/03/2020 23.8 22.0 - 29.0 mmol/L Final   04/02/2020 21.9 (L) 22.0 - 29.0 mmol/L Final   04/01/2020 21.8 (L) 22.0 - 29.0 mmol/L Final      BUN BUN   Date Value Ref Range Status   04/03/2020 49 (H) 8 - 23 mg/dL Final   04/02/2020 49 (H) 8 - 23 mg/dL Final   04/01/2020 46 (H) 8 - 23 mg/dL Final      Creatinine Creatinine   Date Value Ref Range Status   04/03/2020 2.04 (H) 0.57 - 1.00 mg/dL Final   04/02/2020 1.99 (H) 0.57 - 1.00  mg/dL Final   04/01/2020 2.03 (H) 0.57 - 1.00 mg/dL Final      Calcium Calcium   Date Value Ref Range Status   04/03/2020 7.7 (L) 8.6 - 10.5 mg/dL Final   04/02/2020 7.9 (L) 8.6 - 10.5 mg/dL Final   04/01/2020 7.9 (L) 8.6 - 10.5 mg/dL Final      PO4 No components found for: PO4   Albumin Albumin   Date Value Ref Range Status   04/03/2020 2.20 (L) 3.50 - 5.20 g/dL Final   04/02/2020 2.60 (L) 3.50 - 5.20 g/dL Final   04/01/2020 2.50 (L) 3.50 - 5.20 g/dL Final      Magnesium No results found for: MG   Uric Acid No components found for: URIC ACID     Imaging Results (Last 72 Hours)     Procedure Component Value Units Date/Time    US Thoracentesis [[id number]] Collected:  04/02/20 1618    Specimen:  Body Fluid Updated:  04/03/20 1226    Narrative:       ULTRASOUND-GUIDED RIGHT THORACENTESIS       HISTORY: Pleural effusion.     After signed informed consent was obtained patient was prepped and  draped in the upright sitting position. Lidocaine was used for local  anesthesia.     Ultrasound guidance was used to place the thoracentesis catheter into  the right pleural fluid collection. 1500 mL of pleural fluid was  removed. Confirmatory images were obtained.     Patient tolerated the procedure well with no complications.     This report was finalized on 4/3/2020 12:23 PM by Dr. Laci Hoang M.D.       XR Chest 1 View [[id number]] Collected:  04/02/20 1642     Updated:  04/02/20 1647    Narrative:       PORTABLE CHEST 04/02/2020 AT 3:57 PM     CLINICAL HISTORY: Pleural effusion. Postop thoracentesis.     Compared to the previous chest performed earlier today.     There is a small residual right pleural effusion that has diminished in  size significantly since the preceding chest x-ray. Pleural fluid  extends into the major fissure. There is no pneumothorax. A tiny left  pleural effusion is unchanged. No focal infiltrates are identified. The  heart is normal in size.     This report was finalized on 4/2/2020 4:44 PM by   Javi Quigley M.D.       XR Chest PA & Lateral [[id number]] Collected:  04/02/20 1133     Updated:  04/02/20 1133    Narrative:       TWO-VIEW CHEST     HISTORY: Follow-up of pleural effusion. Previous lung cancer and  non-Hodgkin's lymphoma.     FINDINGS: There is a large right pleural effusion with some adjacent  atelectasis showing no change from 3 days ago. There is now a moderately  large left pleural effusion showing further enlargement. There is some  adjacent atelectasis at the left base. The lungs are otherwise clear and  the heart remains mildly enlarged.             Results for orders placed during the hospital encounter of 03/23/20   XR Chest 1 View    Narrative PORTABLE CHEST 04/02/2020 AT 3:57 PM     CLINICAL HISTORY: Pleural effusion. Postop thoracentesis.     Compared to the previous chest performed earlier today.     There is a small residual right pleural effusion that has diminished in  size significantly since the preceding chest x-ray. Pleural fluid  extends into the major fissure. There is no pneumothorax. A tiny left  pleural effusion is unchanged. No focal infiltrates are identified. The  heart is normal in size.     This report was finalized on 4/2/2020 4:44 PM by Dr. Javi Quigley M.D.      XR Chest PA & Lateral    Narrative TWO-VIEW CHEST     HISTORY: Follow-up of pleural effusion. Previous lung cancer and  non-Hodgkin's lymphoma.     FINDINGS: There is a large right pleural effusion with some adjacent  atelectasis showing no change from 3 days ago. There is now a moderately  large left pleural effusion showing further enlargement. There is some  adjacent atelectasis at the left base. The lungs are otherwise clear and  the heart remains mildly enlarged.      XR Chest PA & Lateral    Narrative TWO-VIEW CHEST     HISTORY: Previous history of lung cancer and non-Hodgkin's lymphoma.  Shortness of breath. Pleural effusion.     FINDINGS: There is a very large right pleural effusion showing  further  increase in size since 7 days ago. There is associated dense atelectasis  and possible pneumonia at the right base also showing worsening. There  is a tiny left pleural effusion. The lungs are otherwise clear and the  heart size remains normal.     This report was finalized on 3/30/2020 5:07 PM by Dr. Kamlesh Delgado M.D.          Results for orders placed during the hospital encounter of 01/31/20   Duplex Venous Lower Extremity - Bilateral CAR    Narrative · Left popliteal fossa fluid collection.  · Acute right lower extremity deep vein thrombosis noted in the mid   femoral, popliteal, posterior tibial and peroneal.  · Acute left lower extremity deep vein thrombosis noted in the popliteal   and posterial tibial.  · All other veins appeared normal bilaterally.            ASSESSMENT / PLAN      Ventral hernia with obstruction and without gangrene    Portal hypertension (CMS/HCC)    Liver cirrhosis secondary to CURIEL (CMS/HCC)    Acute kidney failure (CMS/HCC)    Hypothyroidism (acquired)    Mass of upper lobe of left lung    Hematemesis    CKD (chronic kidney disease) stage 3, GFR 30-59 ml/min (CMS/HCC)    Anemia, chronic disease    NHL (nodular histiocytic lymphoma) (CMS/HCC)    History of DVT (deep vein thrombosis)    DNR (do not resuscitate)    History of esophageal varices    Hematemesis with nausea    Pleural effusion    1. AI/CKDd3------JAYDE on top of known CKD STG 3. JAYED is secondary to ATN from hypotension and over diuresis and anemia with decreased renal perfusion.  Avoid hypotension. No NSAIDs or IV dye. Dose meds for CrCl less than 10 cc/min.      2. ACIDOSIS-----Metabolic. No elevation in AGAP. +Type 4 RTA. Increase po NaHCO3     3. CIRRHOSIS/ASCITES/CURIEL---per GI. Ammonia okay     4. ANEMIA OF CKD-------H/H stable post PRBCs.      5. CHRONIC LEFT HYDRONEPHROSIS-----per Urology as an outpatient     6. OA/DJD----No NSAIDs. Add uloric for hyperuricemia     7. HYPOALBUMINEMIA------S/P IV Albumin to  temporize     8. HYPOTENSION-----Midodrine     9. HYPOCALCEMIA------Replace IV. Follow ionized levels     10. HYPONATREMIA----Resolved off of diuretics and with NS     11. H/O BILAT ACUTE DVT LE S/P IVC FILTER     12. HYPOTHYROIDISM      13. H/O NHL     14. GERD WITH H/O ESOPHAGEAL VARICIES-----per GI    15. HYPOKALEMIA------Replaced    Cr stable, continue bumex daily  Sodium low, likely related to excess volume related to liver disease  BP better, continue midodrine  Awaits placement at [facility], can follow there  Monitor UOP, cr, lytes      Ousmane Mccann MD  [facility]  [phone]  04/03/20  13:07

## 2020-04-03 NOTE — DISCHARGE SUMMARY
Patient Name: Kathie Cohen  : 1950  MRN: [id number]    Date of Admission: 3/23/2020  Date of Discharge:  4/3/2020  Primary Care Physician: Heidi Manrique APRN      Chief Complaint:   Abdominal Pain and Vomiting      Discharge Diagnoses     Active Hospital Problems    Diagnosis  POA   • **Ventral hernia with obstruction and without gangrene [K43.6]  Yes   • Pleural effusion [J90]  Unknown   • Hematemesis [K92.0]  Yes   • CKD (chronic kidney disease) stage 3, GFR 30-59 ml/min (CMS/HCC) [N18.3]  Yes   • Anemia, chronic disease [D63.8]  Yes   • NHL (nodular histiocytic lymphoma) (CMS/HCC) [C82.90]  Yes   • History of DVT (deep vein thrombosis) [Z86.718]  Not Applicable   • DNR (do not resuscitate) [Z66]  Yes   • History of esophageal varices [Z87.19]  Yes   • Hematemesis with nausea [K92.0]  Yes   • Mass of upper lobe of left lung [R91.8]  Yes   • Hypothyroidism (acquired) [E03.9]  Yes   • 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        Hospital Course        Ms. Cohen is a 70 y.o. female with a history of CURIEL/cirrhosis, esophageal varices, portal HTN, GERD, lt lung mass, anemia, non-hodkins lymphoma, DVT, lt hydronephrosis, CKD III who presented to [facility] initially complaining of 5 day hx of upper abd pain with nausea and vomiting as well as 20lb weight loss over 2 months.  Please see the admitting history and physical for further details.  She was found to have concern of incarcerated hernia and was admitted to the hospital for further evaluation and treatment.  She was evaluated by surgery and is s/p Exp laparotomy with repair of incarcerated ventral hernia. She required NGT postoperatively and ICU monitoring. Diet was slowly upgraded; advanced to regular by postop day #4. GI has been following. Hgb  has remained stable; no report of GI blood loss. She underwent paracentesis 3/30/20 with 3000 ml of fluid removed. She required rt thoracentesis 4/2/20 due to rt pleural effusion; 1500 ml removed.  Ammonia level has been wnl. Nephrology has been following for management of JAYDE/CKD; likely secondary to ATN from hypotension, over diuresis, & anemia. Diuretic were initially held. Cr is stable; she has been restarted on Bumex 2 mg daily. Midodrine has been adjusted for hypotension. She should continue to avoid NSAIDS. She has required IV replacement of Ca; Na stabilized with IVF's and held diuretics. Hem/Onc has also followed. She should f/u with her oncologist at [facility] for ongoing care. She required plt transfusion on 3/26/20; plts have remained stable. Medically, she is stable for discharge to rehab facility.         Day of Discharge     Denies dyspnea, chest pain, fever. Wants to go to rehab. Sats 97% on RA    Physical Exam:  Temp:  [96.7 °F (35.9 °C)-97.8 °F (36.6 °C)] 97.4 °F (36.3 °C)  Heart Rate:  [81-96] 83  Resp:  [16-18] 16  BP: ()/(47-70) 101/57  Body mass index is 24.96 kg/m².  Physical Exam   Constitutional: She is oriented to person, place, and time.   Chronically ill appearing   HENT:   Head: Normocephalic.   Eyes: Conjunctivae are normal.   Neck: No JVD present.   Cardiovascular: Normal rate and regular rhythm.   Pulmonary/Chest: Effort normal. No respiratory distress.   Mildly coarse breath sounds   Abdominal: Bowel sounds are normal. She exhibits distension.   Musculoskeletal: She exhibits edema.   2+ BLE   Neurological: She is alert and oriented to person, place, and time.   Skin: Skin is warm and dry.   Mild erythema Clarence lower legs;  No weeping, open areas   Psychiatric: She has a normal mood and affect.   Vitals reviewed.      Consultants     Consult Orders (all) (From admission, onward)     Start     Ordered    03/26/20 1119  Inpatient Nephrology Consult  Once        Specialty:  Nephrology  Provider:  Ousmane Mccann MD    03/26/20 1119    03/24/20 1706  Inpatient Intensivist Consult  Once     Specialty:  Intensive Care  Provider:  Po Mitchell MD    03/24/20 1706    03/24/20 1111  Inpatient General Surgery Consult  Once     Specialty:  General Surgery  Provider:  Christopher Lu MD    03/24/20 1110    03/23/20 1819  Inpatient Nutrition Consult  Once     Provider:  (Not yet assigned)    03/23/20 1826    03/23/20 1818  Inpatient Gastroenterology Consult  Once     Specialty:  Gastroenterology  Provider:  Ramesh Green MD    03/23/20 1826    03/23/20 1818  Inpatient Hematology & Oncology Consult  Once     Specialty:  Hematology and Oncology  Provider:  Kate Mercado MD    03/23/20 1826 03/23/20 1818  Inpatient Case Management  Consult  Once     Provider:  (Not yet assigned)    03/23/20 1826    03/23/20 1531  [facility] (on-call MD unless specified) Details  Once     Specialty:  Hospitalist  Provider:  (Not yet assigned)    03/23/20 1530              Procedures     Imaging Results (All)     Procedure Component Value Units Date/Time    US Thoracentesis [[id number]] Collected:  04/02/20 1618    Specimen:  Body Fluid Updated:  04/03/20 1226    Narrative:       ULTRASOUND-GUIDED RIGHT THORACENTESIS       HISTORY: Pleural effusion.     After signed informed consent was obtained patient was prepped and  draped in the upright sitting position. Lidocaine was used for local  anesthesia.     Ultrasound guidance was used to place the thoracentesis catheter into  the right pleural fluid collection. 1500 mL of pleural fluid was  removed. Confirmatory images were obtained.     Patient tolerated the procedure well with no complications.     This report was finalized on 4/3/2020 12:23 PM by Dr. Laci Hoang M.D.       XR Chest 1 View [[id number]] Collected:  04/02/20 1642     Updated:  04/02/20 1647    Narrative:       PORTABLE CHEST 04/02/2020 AT 3:57 PM     CLINICAL  HISTORY: Pleural effusion. Postop thoracentesis.     Compared to the previous chest performed earlier today.     There is a small residual right pleural effusion that has diminished in  size significantly since the preceding chest x-ray. Pleural fluid  extends into the major fissure. There is no pneumothorax. A tiny left  pleural effusion is unchanged. No focal infiltrates are identified. The  heart is normal in size.     This report was finalized on 4/2/2020 4:44 PM by Dr. Javi Quigley M.D.       XR Chest PA & Lateral [[id number]] Collected:  04/02/20 1133     Updated:  04/02/20 1133    Narrative:       TWO-VIEW CHEST     HISTORY: Follow-up of pleural effusion. Previous lung cancer and  non-Hodgkin's lymphoma.     FINDINGS: There is a large right pleural effusion with some adjacent  atelectasis showing no change from 3 days ago. There is now a moderately  large left pleural effusion showing further enlargement. There is some  adjacent atelectasis at the left base. The lungs are otherwise clear and  the heart remains mildly enlarged.       US Paracentesis [[id number]] Collected:  03/30/20 1725    Specimen:  Body Fluid Updated:  03/30/20 1748    Narrative:       ULTRASOUND-GUIDED PARACENTESIS     HISTORY: Ascites. Abdominal distention.     TECHNIQUE: Following sterile prep and local anesthetic, a special  paracentesis catheter was inserted into the upper right abdomen by Dr. Delgado. Ascites fluid 3000 mL was then removed with suction  technique. The patient tolerated the procedure well and there were no  immediate complications.     This report was finalized on 3/30/2020 5:45 PM by Dr. Kamlesh Delgado M.D.       XR Chest PA & Lateral [[id number]] Collected:  03/30/20 1443     Updated:  03/30/20 1710    Narrative:       TWO-VIEW CHEST     HISTORY: Previous history of lung cancer and non-Hodgkin's lymphoma.  Shortness of breath. Pleural effusion.     FINDINGS: There is a very large right pleural effusion showing  further  increase in size since 7 days ago. There is associated dense atelectasis  and possible pneumonia at the right base also showing worsening. There  is a tiny left pleural effusion. The lungs are otherwise clear and the  heart size remains normal.     This report was finalized on 3/30/2020 5:07 PM by Dr. Kamlesh Delgado M.D.       XR Abdomen KUB [[id number]] Collected:  03/27/20 1209     Updated:  03/27/20 1214    Narrative:       XR ABDOMEN KUB-     INDICATIONS: Postoperative abdominal distention     TECHNIQUE: Supine views of the abdomen     COMPARISON:  image from CT from 03/24/2020     FINDINGS:     Skin staples overlie the left aspect of the abdomen and pelvis. Left  ureteral stent is in place. IVC filter is noted.      The bowel gas pattern is abnormal, with multiple abnormally dilated  loops of bowel, gas also seen in the colon. The postsurgical setting,  appearance suggests postoperative ileus, with early or partial small  bowel obstruction also possible. No supine evidence for free  intraperitoneal gas. Continued close follow-up is advised. If there is  further clinical concern, CT can be obtained for further evaluation.       Impression:          As described.     This report was finalized on 3/27/2020 12:11 PM by Dr. Mo Morales M.D.       US Renal Bilateral [[id number]] Collected:  03/26/20 1621     Updated:  03/27/20 0736    Narrative:       US RENAL BILATERAL 03/26/2020     HISTORY: Renal failure.     Right kidney measures 9.8 cm in length. Left kidney also measures 9.8 cm  in length. No hydronephrosis, stones or masses are seen. There is  minimal prominence of the left renal pelvis with a stent visualized in  the left renal pelvis.     Moderate amount of ascites is seen including a loculated fluid  collection in the left lower quadrant.     Urinary bladder is incompletely distended.       Impression:       1.  Unremarkable bilateral renal ultrasound except for minimal  prominence of  the left renal pelvis and a ureteral stent within the left  renal pelvis.  2.  Ascites including a loculated fluid collection in the left lower  quadrant.     This report was finalized on 3/27/2020 7:33 AM by Dr. Yfian Sung M.D.       CT Abdomen Pelvis Without Contrast [[id number]] Collected:  03/24/20 0951     Updated:  03/24/20 1013    Narrative:       CT ABDOMEN PELVIS WITHOUT CONTRAST-     Radiation dose reduction techniques were utilized, including automated  exposure control and exposure modulation based on body size.     CLINICAL: Abdominal pain, hematemesis, nausea. CURIEL-related cirrhosis  with chronic renal disease, suspicious left lung mass.     COMPARISON: None.     FINDINGS: Moderate to large right-sided pleural effusion, attenuation is  compatible with complex fluid. Trace left pleural effusion in the  costophrenic sulcus with partially within the field-of-view an area of  subpleural consolidation which is curvilinear in shape. Potential  pneumonia.     The stomach is fluid-filled. The gastroesophageal junction is  satisfactory in appearance. There is splenomegaly with a moderate amount  of free intraperitoneal fluid. Liver morphology consistent with  cirrhosis. No biliary duct dilatation status post cholecystectomy. No  extrahepatic biliary duct dilatation seen. Attenuation of the fluid  within the peritoneal cavity is consistent with serous fluid. There is  collateral vessel formation within the upper abdomen.     To the left of midline, along the low anterior abdominal wall there is a  ventral hernia containing a segment of small bowel. There are dilated  loops of small bowel extending to the hernia sac with normal caliber  small bowel exiting consistent with incarceration. The dilated small  bowel loops also demonstrate areas of wall thickening and there is  portal venous gas worrisome for ischemia.     There is dense fecal material demonstrated within the rectum, cannot  exclude impaction.  The colon caliber is normal. Caliber of the distal  ileum within normal limits. There is diverticulosis of the colon.     The right kidney is satisfactory in appearance, there is an IVC filter  in position. The diameter of the aorta is within normal limits. The  adrenal glands have a typical appearance. There is a double-J ureteral  stent on the left, position is typical. Moderate hydronephrosis. The  contour of the left kidney is within normal limits.     There is a large spindle-shaped serous fluid collection demonstrated  along the left retroperitoneum which is enveloping the psoas and  iliopsoas extending from the level of the kidney to the left hip having  a maximum diameter of 11 cm. Etiology is uncertain, perhaps related to  instrumentation of the left renal collecting system.     CONCLUSION:. Incarcerated low left anterior abdominal ventral hernia  containing small bowel. Dilated loops of small bowel leading to this  location with collapsed small bowel exiting. Dilated bowel loops  demonstrate wall thickening and there is portal gas which can be seen  with simply obstruction however is worrisome for ischemia.     There is a right-sided pleural effusion, attenuation compatible with  complex fluid.     The liver is small in size with cirrhotic morphology, there is  splenomegaly with collateral vessel formation in the upper abdomen. In  addition there is free intraperitoneal fluid.     There is a spindle-shaped fluid collection demonstrated within the left  retroperitoneum along the psoas/iliopsoas as described above. Etiology  is uncertain however perhaps related to recent instrumentation.     Findings of this report called to Dr. Martinez at the time of completion,  10:10 AM.        This report was finalized on 3/24/2020 10:09 AM by Dr. Augustus Benavides M.D.       XR Chest PA & Lateral [[id number]] Collected:  03/23/20 1911     Updated:  03/23/20 1916    Narrative:       XR CHEST PA AND LATERAL-     HISTORY:  Female who is 70 years-old,  crackles     TECHNIQUE: Frontal and lateral views of the chest     COMPARISON: 01/24/2020     FINDINGS: Heart, mediastinum and pulmonary vasculature are unremarkable.  Moderate right pleural effusion is present, with small atelectasis or  infiltrate at the bases. Lateral view is technically limited by  overpenetration. No pneumothorax. No acute osseous process.       Impression:       Moderate right pleural effusion with small atelectasis or  infiltrate at the lung bases, follow-up recommended.     This report was finalized on 3/23/2020 7:13 PM by Dr. Mo Morales M.D.             Pertinent Labs     Results from last 7 days   Lab Units 04/03/20 0419 04/02/20 0638 04/01/20 0725 03/31/20  0638   WBC 10*3/mm3 6.48 8.56 8.47 9.17   HEMOGLOBIN g/dL 9.6* 10.4* 10.4* 11.7*   PLATELETS 10*3/mm3 69* 74* 91* 100*     Results from last 7 days   Lab Units 04/03/20 0419 04/02/20 0638 04/01/20 0725 03/31/20  0638   SODIUM mmol/L 130* 131* 130* 130*   POTASSIUM mmol/L 3.7 4.0 3.9 4.1   CHLORIDE mmol/L 94* 97* 97* 97*   CO2 mmol/L 23.8 21.9* 21.8* 22.6   BUN mg/dL 49* 49* 46* 45*   CREATININE mg/dL 2.04* 1.99* 2.03* 1.89*   GLUCOSE mg/dL 93 107* 122* 100*   Estimated Creatinine Clearance: 22.2 mL/min (A) (by C-G formula based on SCr of 2.04 mg/dL (H)).  Results from last 7 days   Lab Units 04/03/20 0419 04/02/20 0638 04/01/20 0725 03/31/20  0638 03/29/20  0437 03/28/20  0635   ALBUMIN g/dL 2.20* 2.60* 2.50* 2.80* 2.80* 3.00*   BILIRUBIN mg/dL  --   --   --   --  0.5 0.6   ALK PHOS U/L  --   --   --   --  79 71   AST (SGOT) U/L  --   --   --   --  10 9   ALT (SGPT) U/L  --   --   --   --  9 9     Results from last 7 days   Lab Units 04/03/20  0419 04/02/20  0638 04/01/20  0725 03/31/20  0638 03/30/20  0712 03/29/20  0437 03/28/20  0635   CALCIUM mg/dL 7.7* 7.9* 7.9* 8.3* 8.4* 7.8* 8.0*   ALBUMIN g/dL 2.20* 2.60* 2.50* 2.80*  --  2.80* 3.00*   MAGNESIUM mg/dL  --   --   --  2.1 2.0 2.0 2.0    PHOSPHORUS mg/dL 3.4 2.8 2.4* 2.4* 2.5 2.2* 2.6               Invalid input(s): LDLCALC  Results from last 7 days   Lab Units 04/02/20  1535 04/01/20  1228 03/30/20  1623   BODYFLDCX  No growth  --  No growth at 4 days   URINECX   --  <25,000 CFU/mL Mixed Shaylee Isolated  --        Test Results Pending at Discharge      Order Current Status    Anaerobic Culture - Pleural Fluid, Pleural Cavity In process    Non-gynecologic Cytology In process    Body Fluid Culture - Body Fluid, Peritoneum Preliminary result    Body Fluid Culture - Body Fluid, Pleural Cavity Preliminary result          Discharge Details        Discharge Medications      New Medications      Instructions Start Date   febuxostat 40 MG tablet  Commonly known as:  ULORIC   40 mg, Oral, Daily   Start Date:  April 4, 2020     guaiFENesin 600 MG 12 hr tablet  Commonly known as:  MUCINEX   600 mg, Oral, 2 Times Daily      lactulose 10 GM/15ML solution  Commonly known as:  CHRONULAC  Replaces:  lactulose 10 GM/15ML solution solution (encephalopathy)   20 g, Oral, 2 Times Daily      pantoprazole 40 MG EC tablet  Commonly known as:  PROTONIX   40 mg, Oral, 2 Times Daily Before Meals      sodium bicarbonate 650 MG tablet   650 mg, Oral, 3 Times Daily         Changes to Medications      Instructions Start Date   bumetanide 2 MG tablet  Commonly known as:  BUMEX  What changed:    · medication strength  · how much to take  · when to take this   2 mg, Oral, Daily   Start Date:  April 4, 2020     levothyroxine 50 MCG tablet  Commonly known as:  Synthroid  What changed:  additional instructions   50 mcg, Oral, Daily      midodrine 10 MG tablet  Commonly known as:  PROAMATINE  What changed:    · medication strength  · how much to take  · when to take this   10 mg, Oral, 3 Times Daily Before Meals         Continue These Medications      Instructions Start Date   bisacodyl 5 MG EC tablet  Commonly known as:  DULCOLAX   5 mg, Oral, Daily PRN      fluticasone 50 MCG/ACT  nasal spray  Commonly known as:  Flonase   2 sprays, Nasal, Daily      hydrocortisone-pramoxine 2.5-1 % rectal cream  Commonly known as:  ANALPRAM-HC   Rectal, 2 Times Daily         Stop These Medications    Bensal HP 3-6 % ointment  Generic drug:  Salicylic Acid-Benzoic Acid     lactulose 10 GM/15ML solution solution (encephalopathy)  Commonly known as:  CHRONULAC  Replaced by:  lactulose 10 GM/15ML solution     multivitamin with minerals tablet tablet     ondansetron ODT 4 MG disintegrating tablet  Commonly known as:  Zofran ODT     vitamin C 250 MG tablet  Commonly known as:  ASCORBIC ACID            Allergies   Allergen Reactions   • Demerol [Meperidine] Hallucinations     Patient saw kids coming through the window with their heads off which scared her to death.           Discharge Disposition:  Rehab Facility or Unit (DC - External)    Discharge Diet:  Diet Order   Procedures   • Diet Regular; GI Soft, Low Sodium; 2,000 mg Na       Discharge Activity:   Activity Instructions     Activity as Tolerated            CODE STATUS:    Code Status and Medical Interventions:   Ordered at: 03/23/20 1826     Level Of Support Discussed With:    Patient     Code Status:    No CPR     Medical Interventions (Level of Support Prior to Arrest):    Full       Future Appointments   Date Time Provider Department Center   6/10/2020  1:10 PM Laya Abad MD [facility] NA   6/24/2020  1:45 PM Heidi Manrique APRN [facility] None   8/6/2020 11:15 AM Regla Motta MD [facility] NA None      Contact information for follow-up providers     Christopher Lu MD. Schedule an appointment as soon as possible for a visit in 1 week(s).    Specialty:  General Surgery  Contact information:  [address] [address]  [phone]             Heidi Manrique APRN. Schedule an appointment as soon as possible for a visit in 2 week(s).    Specialties:  Nurse Practitioner, Family Medicine  Contact information:  [address]  [address] [address]  [phone]             [facility] Gastroenterology Follow up in 4 week(s).           Oncology. Schedule an appointment as soon as possible for a visit in 1 month(s).           Urology. Schedule an appointment as soon as possible for a visit in 1 month(s).                 Contact information for after-discharge care     Destination     [facility] .    Service:  Inpatient Rehabilitation  Contact information:  [address] [place] [address] [phone]                             Time Spent on Discharge:  Greater than 30 minutes      ANGIE Brunner  [facility]  04/03/20  12:27 PM

## 2020-04-06 NOTE — PROGRESS NOTES
Case Management Discharge Note      Final Note: Patient DC'd to [facility]    Provided Post Acute Provider List?: Yes  Post Acute Provider List: Nursing Home  Provided Post Acute Provider Quality & Resource List?: Refused  Delivered To: Support Person  Support Person: Enzo Godinez  Method of Delivery: Telephone    Destination - Selection Complete      Service Provider Request Status Selected Services Address Phone Number Fax Number    [facility] Selected Inpatient Rehabilitation [address] [address] [phone]        Transportation Services  Ambulance: [facility]    Final Discharge Disposition Code: 62 - inpatient rehab facility

## 2020-04-10 NOTE — PROGRESS NOTES
Dr. Clemens, [phone] called from [facility].  Patient had a recent ultrasound Doppler that showed acute bilateral lower extremity DVT.  She was inquiring if the patient should be put on anticoagulation and if it was okay for her to participate in physical therapy.  I reviewed her medical record.  Patient had a psoas muscle bleed in January and IVC filter was placed in February because we were unable to anticoagulate her due to her bleeding.  She had acute bilateral lower extremity DVTs at the time.  At this time, no anticoagulation was indicated, current platelet count 95,000.  It was okay for her to participate in physical therapy.  I requested a follow-up appointment with Dr. Duran  Electronically signed by ANGIE George, 04/10/20, 2:46 PM.

## 2020-04-13 NOTE — PROGRESS NOTES
It looks like this lower extremity doppler was ordered by Dr. Palmira Clem[facility]. She has multiple DVTs in the right and left lower legs. We need to make sure that this is being followed up on by Dr. Clemens at her rehab, can you make sure that he got a copy of the results and is treating accordingly? I don't see any notes in Epic faxed to me. Thank you.

## 2020-04-20 PROBLEM — R53.1 WEAKNESS: Status: ACTIVE | Noted: 2020-01-01

## 2020-04-20 NOTE — PROGRESS NOTES
"CM received call from ER Charge nurse Iraida. Daughter Gretchen Arndt [phone] called and wanted patient placed in [facility] rehab from home.  I spoke with Ivy MIKE who affirmed that pt needed to be admitted directly form home to [facility] and that daughter needed to call and set that up with [facility].  CM called Daughter Gretchen Arndt [phone].  Daughter states that she feels pt is unable to be home alone and she recently \"signed herself out of [facility]\".  (While pt was in ER last night an unsuccessful attempt was made to readmit back to [facility].)  Information relayed to daughter that she will have to arrange [facility] herself from home since pt was no longer here in hospital.  Pt appears to have 3 MN stay within last 30 days. Daughter states frustration but understanding.  Daughter given CEE office number that assisted pt last night in ER if any further questions arise.   "
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
## 2020-04-20 NOTE — ED NOTES
unable to place pt back into [facility], daughter called to pick pt up     Nani Alanis LPN  04/19/20 2150    Provider asked for walker for pt to go home with     Nani Alanis LPN  04/19/20 2152    Pt states she already has a walker at home     Nani Alanis LPN  04/19/20 2201
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
## 2020-04-20 NOTE — PROGRESS NOTES
I called and spoke with triage nurse at [facility].  According to patient's family patient is increasingly confused today with high falls risk.  Worsening confusion, worsening decubitus ulcers of the coccyx/sacrum, wounds to the bilateral legs with weeping, new incontinence of bowel and bladder.  I am concerned for metabolic encephalopathy/possible sepsis due to patient's family's description of her symptoms.  We will advise the patient's family call EMS for transport to [facility] ER.

## 2020-04-20 NOTE — TELEPHONE ENCOUNTER
ANURADHA WITH [facility], STATES PT D/C FROM [facility]. HAD A FALL AND FLUID IN HER LEGS THAT ARE LEAKING, NEED VERBAL FOR SKILLED NURSING, PT AND HOME HEALTH AID TO ADDRESS THIS.    ANURADHA CAN BE REACHED [phone]. DIRECT LINE .

## 2020-04-20 NOTE — ED NOTES
Daughter (Gretchen) called in regards to pt being DC from ED, daughter states she needs help with the pt because the pt lives at home alone and she is unable to take care of herself at home.  on call called (Zaida) and stated she would contact [facility] about possible readmission but stated family may have to come pick her up anyway.      Nani Alanis, LPN  04/19/20 2120
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
## 2020-04-21 PROBLEM — L89.150 UNSTAGEABLE PRESSURE ULCER OF SACRAL REGION (HCC): Status: ACTIVE | Noted: 2020-01-01

## 2020-04-21 PROBLEM — N13.1 HYDRONEPHROSIS WITH URETERAL STRICTURE, NOT ELSEWHERE CLASSIFIED: Status: ACTIVE | Noted: 2020-01-01

## 2020-04-21 NOTE — ANESTHESIA PREPROCEDURE EVALUATION
Anesthesia Evaluation     Patient summary reviewed and Nursing notes reviewed   NPO Solid Status: > 6 hours  NPO Liquid Status: > 6 hours           Airway   Mallampati: II  TM distance: >3 FB  Neck ROM: full  No difficulty expected  Dental - normal exam     Pulmonary - normal exam    breath sounds clear to auscultation  (+) pleural effusion, COPD,   Cardiovascular - normal exam    ECG reviewed  Rhythm: regular  Rate: normal    (+) hypertension, CHF , DVT,       Neuro/Psych  (+) weakness, psychiatric history,     GI/Hepatic/Renal/Endo    (+) obesity, morbid obesity, GERD, GI bleeding , hepatitis, liver disease, renal disease,     Musculoskeletal     Abdominal  - normal exam    Abdomen: soft.  Bowel sounds: normal.   Substance History - negative use     OB/GYN negative ob/gyn ROS         Other   arthritis,    history of cancer                    Anesthesia Plan    ASA 3     general and MAC     intravenous induction     Anesthetic plan, all risks, benefits, and alternatives have been provided, discussed and informed consent has been obtained with: patient.    Plan discussed with [facility].

## 2020-04-21 NOTE — TELEPHONE ENCOUNTER
----- Message from ANGIE George sent at 4/10/2020  2:44 PM EDT -----  Patient overdue follow-up with Dr. Schmidt.  Please schedule appointment.

## 2020-04-22 NOTE — OUTREACH NOTE
Prep Survey      Responses   [facility] facility patient discharged from?  [facility]   Is LACE score < 7 ?  No   Eligibility  Not Eligible   What are the reasons patient is not eligible?  Hospice/Pallative Care   COVID-19 Test Status  Not tested   Does the patient have one of the following disease processes/diagnoses(primary or secondary)?  Other   Prep survey completed?  Yes          Louann Rosas RN

## 2020-04-22 NOTE — OUTREACH NOTE
Prep Survey      Responses   [employer] facility patient discharged from?  [facility]   Is LACE score < 7 ?  No   Eligibility  Not Eligible   What are the reasons patient is not eligible?  Hospice/Pallative Care   COVID-19 Test Status  Not tested   Does the patient have one of the following disease processes/diagnoses(primary or secondary)?  Other   Prep survey completed?  Yes          Louann Rosas RN

## 2020-05-19 LAB — FUNGUS WND CULT: NORMAL

## 2020-06-02 LAB
MYCOBACTERIUM SPEC CULT: NORMAL
NIGHT BLUE STAIN TISS: NORMAL

## 2024-01-01 NOTE — TELEPHONE ENCOUNTER
If she is vomiting black, I would advise that she go to the emergency room. This could be a GI bleed. hard copy, drawn during this pregnancy
